# Patient Record
Sex: FEMALE | Race: BLACK OR AFRICAN AMERICAN | NOT HISPANIC OR LATINO | Employment: UNEMPLOYED | URBAN - METROPOLITAN AREA
[De-identification: names, ages, dates, MRNs, and addresses within clinical notes are randomized per-mention and may not be internally consistent; named-entity substitution may affect disease eponyms.]

---

## 2017-01-17 ENCOUNTER — OFFICE VISIT (OUTPATIENT)
Dept: URGENT CARE | Facility: URGENT CARE | Age: 27
End: 2017-01-17
Payer: MEDICARE

## 2017-01-17 VITALS
BODY MASS INDEX: 30.31 KG/M2 | SYSTOLIC BLOOD PRESSURE: 114 MMHG | HEART RATE: 74 BPM | OXYGEN SATURATION: 98 % | WEIGHT: 145 LBS | DIASTOLIC BLOOD PRESSURE: 76 MMHG | TEMPERATURE: 98.8 F

## 2017-01-17 DIAGNOSIS — L01.00 IMPETIGO: Primary | ICD-10-CM

## 2017-01-17 PROCEDURE — 87491 CHLMYD TRACH DNA AMP PROBE: CPT | Performed by: FAMILY MEDICINE

## 2017-01-17 PROCEDURE — 99213 OFFICE O/P EST LOW 20 MIN: CPT | Performed by: FAMILY MEDICINE

## 2017-01-17 PROCEDURE — 87591 N.GONORRHOEAE DNA AMP PROB: CPT | Performed by: FAMILY MEDICINE

## 2017-01-17 RX ORDER — MUPIROCIN 20 MG/G
OINTMENT TOPICAL 3 TIMES DAILY
Qty: 1 G | Refills: 1 | Status: SHIPPED | OUTPATIENT
Start: 2017-01-17 | End: 2017-01-22

## 2017-01-17 RX ORDER — LORAZEPAM 1 MG/1
1 TABLET ORAL EVERY 8 HOURS PRN
Qty: 15 TABLET | Refills: 0 | Status: SHIPPED | OUTPATIENT
Start: 2017-01-17 | End: 2017-04-25

## 2017-01-17 NOTE — PROGRESS NOTES
"Some of this note was populated by a medical assistant.    SUBJECTIVE:                                                    Ayesha Casas is a 26 year old female who presents to clinic today for the following health issues:    Rash      Duration: last week.     Description  Location: right side of face.   Itching: no    Intensity:  mild    Accompanying signs and symptoms: Pt states that she popped it 4 days ago and that she put some witch hazel, tea tree oil, noxzema- no relief.     History (similar episodes/previous evaluation): Pt c/o waking up in the middle of the night with gasping for air. Anxious that this may be \"herpes\"    Precipitating or alleviating factors:  New exposures:  Essential oils  Recent travel: YES- [place] four days ago. Pt states that she had the rash prior to going out to [place].      Therapies tried and outcome: none         Problem list and histories reviewed & adjusted, as indicated.  Additional history: as documented    Patient Active Problem List   Diagnosis     Severe persistent asthma     CARDIOVASCULAR SCREENING; LDL GOAL LESS THAN 160     Anxiety     Tobacco use disorder     Past Surgical History   Procedure Laterality Date     No history of surgery         Social History   Substance Use Topics     Smoking status: Current Every Day Smoker -- 0.20 packs/day     Types: Cigarettes     Smokeless tobacco: Never Used     Alcohol Use: Yes      Comment: beer 2/d     Family History   Problem Relation Age of Onset     Hypertension Maternal Grandmother      DIABETES Maternal Grandfather      Cancer - colorectal Maternal Grandfather      Breast Cancer Maternal Aunt          Current Outpatient Prescriptions   Medication Sig Dispense Refill     ibuprofen 200 MG capsule Take 200 mg by mouth every 4 hours as needed       cetirizine (ZYRTEC) 10 MG tablet Take 1 tablet (10 mg) by mouth every evening 60 tablet 5     hydrocortisone (ANUSOL-HC) 2.5 % rectal cream Place rectally 2 times daily 30 g 1 "     albuterol (PROVENTIL HFA: VENTOLIN HFA) 108 (90 BASE) MCG/ACT inhaler Inhale 2 puffs into the lungs every 6 hours. 2 Inhaler 5     Allergies   Allergen Reactions     Penicillins      ROS:  Constitutional, HEENT, cardiovascular, pulmonary, gi and gu systems are negative, except as otherwise noted.    OBJECTIVE:                                                    /76 mmHg  Pulse 74  Temp(Src) 98.8  F (37.1  C) (Oral)  Wt 145 lb (65.772 kg)  SpO2 98%  Breastfeeding? No  Body mass index is 30.31 kg/(m^2).  GENERAL: healthy, alert and no distress  NECK: no adenopathy, no asymmetry, masses, or scars and thyroid normal to palpation  RESP: lungs clear to auscultation - no rales, rhonchi or wheezes  CV: regular rate and rhythm, normal S1 S2, no S3 or S4, no murmur, click or rub, no peripheral edema and peripheral pulses strong  ABDOMEN: soft, nontender, no hepatosplenomegaly, no masses and bowel sounds normal  MS: no gross musculoskeletal defects noted, no edema  SKIN: right lower chin area focal honey crusted lesion that is consistent with impetigo   Verm[place] boarder and oral mucosa without lesions or rash.     Diagnostic Test Results:  none      ASSESSMENT/PLAN:                                                        ICD-10-CM    1. Impetigo L01.00 Neisseria gonorrhoeae PCR     Chlamydia trachomatis PCR     mupirocin (BACTROBAN) 2 % ointment     LORazepam (ATIVAN) 1 MG tablet        PLAN  Reassurance provided. STD screen as above per patient request.   Patient educational/instructional material provided including reasons for follow-up   The patient indicates understanding of these issues and agrees with the plan.  Og Jaquez MD      [facility]

## 2017-01-17 NOTE — MR AVS SNAPSHOT
After Visit Summary   1/17/2017    Ayesha Casas    MRN: [id number]           Patient Information     Date Of Birth          1990        Visit Information        Provider Department      1/17/2017 12:30 PM Og Jaquez MD [facility]        Today's Diagnoses     Impetigo    -  1       Care Instructions      Understanding Impetigo (Adult)  Impetigo is a skin infection that causes red, crusty sores. It s more common in young children, but adults can also get it. It can be spread easily from person to person.  What causes impetigo?  Impetigo is caused by bacteria. It can be caused by group A streptococci (strep). Or it can be caused by Staphylococcus aureus (staph). The bacteria can cause the infection after an insect bite, cut, or other skin problem causes a break in your skin.  Symptoms of impetigo  Impetigo causes itchy red sores that ooze fluid (pus) and form honey-colored crusts. They are most common on the face, arms, and legs. The sores can get bigger and spread to other parts of your body.  Diagnosing and treating impetigo  Your health care provider will give you a physical exam. He or she will be able to diagnose impetigo by looking at your sores. A bacterial culture may be performed. Impetigo can be treated with antibiotics. You may be given oral medication. Use the antibiotic medication exactly as directed. Do not stop using it if your symptoms get better. Use all of the medication until it is gone.  Your symptoms will likely get better within 3 days. The sores will take several weeks to heal fully.  If you have impetigo  Impetigo is easily spread from person to person. A person can get sores 1 to 3 days after being exposed to another person s sores. Make sure to protect those around you. To prevent the bacteria from spreading to others:    Cover your sores with a bandage. Wash your hands often. This will also help you avoid spreading the infection to  other parts of your body.    Don t share washcloths, towels, pillows, sheets, or clothes with others. Wash these items in hot water before using them again.    Don t scratch or pick at your sores.    Wash your sores with soap and water. Apply an antibacterial cream as advised.     When to call the health care provider  Make sure to contact your health care provider if you have:    Sores that spread or have more pus after 3 days of treatment    Pain or swelling that s new or worse    Fever of 100.4 F (38 C) or higher    Loss of appetite or vomiting     4071-8774 [employer]. [address] [place] [address]. All rights reserved. This information is not intended as a substitute for professional medical care. Always follow your healthcare professional's instructions.        Anxiety Reaction  Anxiety is the feeling we all get when we think something bad might happen. It is a normal response to stress and usually causes only a mild reaction. When anxiety becomes more severe, it can interfere with daily life. In some cases, you may not even be aware of what it is you re anxious about. There may also be a genetic link or it may be a learned behavior in the home.  Both psychological and physical triggers cause stress reaction. It's often a response to fear or emotional stress, real or imagined. This stress may come from home, family, work, or social relationships.  During an anxiety reaction, you may feel:    Helpless    Nervous    Depressed    Irritable  Your body may show signs of anxiety in many ways. You may experience:    Dry mouth    Shakiness    Dizziness    Weakness    Trouble breathing    Breathing fast (hyperventilating)    Chest pressure    Sweating    Headache    Nausea    Diarrhea    Tiredness    Inability to sleep    Sexual problems  Home care    Try to locate the sources of stress in your life. They may not be obvious. These may include:    Daily hassles of life (traffic jams, missed  appointments, car troubles, etc.)    Major life changes, both good (new baby, job promotion) and bad (loss of job, loss of loved one)    Overload: feeling that you have too many responsibilities and can't take care of all of them at once    Feeling helpless, feeling that your problems are beyond what you re able to solve    Notice how your body reacts to stress. Learn to listen to your body signals. This will help you take action before the stress becomes severe.    When you can, do something about the source of your stress. (Avoid hassles, limit the amount of change that happens in your life at one time and take a break when you feel overloaded).    Unfortunately, many stressful situations can't be avoided. It is necessary to learn how to better manage stress. There are many proven methods that will reduce your anxiety. These include simple things like exercise, good nutrition and adequate rest. Also, there are certain techniques that are helpful:    Relaxation    Breathing exercises    Visualization    Biofeedback    Meditation  For more information about this, consult your doctor or go to a local bookstore and review the many books and tapes available on this subject.  Follow-up care  If you feel that your anxiety is not responding to self-help measures, contact your doctor or make an appointment with a counselor. You may need short-term psychological counseling and temporary medicine to help you manage stress.  Call 911  Call your healthcare provider right away if any of these occur:    Trouble breathing    Confusion    Drowsiness or trouble wakening    Fainting or loss of consciousness    Rapid heart rate    Seizure    New chest pain that becomes more severe, lasts longer, or spreads into your shoulder, arm, neck, jaw, or back  When to seek medical advice  Call your healthcare provider right away if any of these occur:    Your symptoms get worse    Severe headache not relieved by rest and mild pain reliever     "[id number] [employer]. [address] [place] [address], [place] [address]. All rights reserved. This information is not intended as a substitute for professional medical care. Always follow your healthcare professional's instructions.              Follow-ups after your visit        Who to contact     If you have questions or need follow up information about today's clinic visit or your schedule please contact [facility] directly at [phone].  Normal or non-critical lab and imaging results will be communicated to you by [employer], letter or phone within 4 business days after the clinic has received the results. If you do not hear from us within 7 days, please contact the clinic through [employer] or phone. If you have a critical or abnormal lab result, we will notify you by phone as soon as possible.  Submit refill requests through [employer] or call your pharmacy and they will forward the refill request to us. Please allow 3 business days for your refill to be completed.          Additional Information About Your Visit        [employer] Information     [employer]ender lets you send messages to your doctor, view your test results, renew your prescriptions, schedule appointments and more. To sign up, go to [email or web address] . Click on \"Log in\" on the left side of the screen, which will take you to the Welcome page. Then click on \"Sign up Now\" on the right side of the page.     You will be asked to enter the access code listed below, as well as some personal information. Please follow the directions to create your username and password.     Your access code is: [email or web address]  Expires: 2017  2:27 PM     Your access code will  in 90 days. If you need help or a new code, please call your [facility] clinic or [phone].        Care EveryWhere ID     This is your Care EveryWhere ID. This could be used by other organizations to access your [facility] medical records  [id number]        Your Vitals " Were     Pulse Temperature Pulse Oximetry Breastfeeding?          74 98.8  F (37.1  C) (Oral) 98% No         Blood Pressure from Last 3 Encounters:   01/17/17 114/76   11/19/14 128/78   06/03/14 111/76    Weight from Last 3 Encounters:   01/17/17 145 lb (65.772 kg)   11/19/14 123 lb (55.792 kg)   06/03/14 121 lb (54.885 kg)              We Performed the Following     Chlamydia trachomatis PCR     Neisseria gonorrhoeae PCR          Today's Medication Changes          These changes are accurate as of: 1/17/17  2:27 PM.  If you have any questions, ask your nurse or doctor.               Start taking these medicines.        Dose/Directions    LORazepam 1 MG tablet   Commonly known as:  ATIVAN   Used for:  Impetigo   Started by:  Og Jaquez MD        Dose:  1 mg   Take 1 tablet (1 mg) by mouth every 8 hours as needed for anxiety   Quantity:  15 tablet   Refills:  0       mupirocin 2 % ointment   Commonly known as:  BACTROBAN   Used for:  Impetigo   Started by:  Og Jaquez MD        Apply topically 3 times daily for 5 days   Quantity:  1 g   Refills:  1            Where to get your medicines      These medications were sent to [facility] - [address] AT [facility]  [address] [facility] [address]    Hours:  24-hours Phone:  [phone]    - mupirocin 2 % ointment      Some of these will need a paper prescription and others can be bought over the counter.  Ask your nurse if you have questions.     Bring a paper prescription for each of these medications    - LORazepam 1 MG tablet             Primary Care Provider Office Phone # Fax #    Mulu Mcmillan PA-C [phone]       [facility] [address] [facility] [address]        Thank you!     Thank you for choosing [facility]  for your care. Our goal is always to provide you with excellent care. Hearing back from our  patients is one way we can continue to improve our services. Please take a few minutes to complete the written survey that you may receive in the mail after your visit with us. Thank you!             Your Updated Medication List - Protect others around you: Learn how to safely use, store and throw away your medicines at www.disposemymeds.org.          This list is accurate as of: 1/17/17  2:27 PM.  Always use your most recent med list.                   Brand Name Dispense Instructions for use    albuterol 108 (90 BASE) MCG/ACT Inhaler    PROAIR HFA/PROVENTIL HFA/VENTOLIN HFA    2 Inhaler    Inhale 2 puffs into the lungs every 6 hours.       cetirizine 10 MG tablet    zyrTEC    60 tablet    Take 1 tablet (10 mg) by mouth every evening       hydrocortisone 2.5 % cream    ANUSOL-HC    30 g    Place rectally 2 times daily       ibuprofen 200 MG capsule      Take 200 mg by mouth every 4 hours as needed       LORazepam 1 MG tablet    ATIVAN    15 tablet    Take 1 tablet (1 mg) by mouth every 8 hours as needed for anxiety       mupirocin 2 % ointment    BACTROBAN    1 g    Apply topically 3 times daily for 5 days

## 2017-01-17 NOTE — Clinical Note
[facility]  [address]  [facility] [address]               January 18, 2017    Ayesha Casas  [address] [facility] [address]    Results for orders placed or performed in visit on 01/17/17   Neisseria gonorrhoeae PCR   Result Value Ref Range    Specimen Descrip Urine     N Gonorrhea PCR  NEG     Negative   Negative for N. gonorrhoeae rRNA by transcription mediated amplification.   A negative result by transcription mediated amplification does not preclude the   presence of N. gonorrhoeae infection because results are dependent on proper   and adequate collection, absence of inhibitors, and sufficient rRNA to be   detected.     Chlamydia trachomatis PCR   Result Value Ref Range    Specimen Description Urine     Chlamydia Trachomatis PCR  NEG     Negative   Negative for C. trachomatis rRNA by transcription mediated amplification.   A negative result by transcription mediated amplification does not preclude the   presence of C. trachomatis infection because results are dependent on proper   and adequate collection, absence of inhibitors, and sufficient rRNA to be   detected.         Dear Ayesha,    Your recent test results came back normal. If symptoms continue or worsen please come back to the clinic.    If you have any questions or concerns, please call myself or my nurse at [phone].      Sincerely,    Og Jaquez MD/AK

## 2017-01-17 NOTE — NURSING NOTE
"Chief Complaint   Patient presents with     Derm Problem     Pt c/o rash on face, breathing concern, and need for STD screening.        Initial /76 mmHg  Pulse 74  Temp(Src) 98.8  F (37.1  C) (Oral)  Wt 145 lb (65.772 kg)  SpO2 98%  Breastfeeding? No Estimated body mass index is 30.31 kg/(m^2) as calculated from the following:    Height as of 6/3/14: 4' 10\" (1.473 m).    Weight as of this encounter: 145 lb (65.772 kg).  BP completed using cuff size: regular    Judith Casas CMA (AAMA)      "

## 2017-01-17 NOTE — PATIENT INSTRUCTIONS
Understanding Impetigo (Adult)  Impetigo is a skin infection that causes red, crusty sores. It s more common in young children, but adults can also get it. It can be spread easily from person to person.  What causes impetigo?  Impetigo is caused by bacteria. It can be caused by group A streptococci (strep). Or it can be caused by Staphylococcus aureus (staph). The bacteria can cause the infection after an insect bite, cut, or other skin problem causes a break in your skin.  Symptoms of impetigo  Impetigo causes itchy red sores that ooze fluid (pus) and form honey-colored crusts. They are most common on the face, arms, and legs. The sores can get bigger and spread to other parts of your body.  Diagnosing and treating impetigo  Your health care provider will give you a physical exam. He or she will be able to diagnose impetigo by looking at your sores. A bacterial culture may be performed. Impetigo can be treated with antibiotics. You may be given oral medication. Use the antibiotic medication exactly as directed. Do not stop using it if your symptoms get better. Use all of the medication until it is gone.  Your symptoms will likely get better within 3 days. The sores will take several weeks to heal fully.  If you have impetigo  Impetigo is easily spread from person to person. A person can get sores 1 to 3 days after being exposed to another person s sores. Make sure to protect those around you. To prevent the bacteria from spreading to others:    Cover your sores with a bandage. Wash your hands often. This will also help you avoid spreading the infection to other parts of your body.    Don t share washcloths, towels, pillows, sheets, or clothes with others. Wash these items in hot water before using them again.    Don t scratch or pick at your sores.    Wash your sores with soap and water. Apply an antibacterial cream as advised.     When to call the health care provider  Make sure to contact your health care  provider if you have:    Sores that spread or have more pus after 3 days of treatment    Pain or swelling that s new or worse    Fever of 100.4 F (38 C) or higher    Loss of appetite or vomiting     6361-7339 [employer]. [address] [place] [address], [place] [address]. All rights reserved. This information is not intended as a substitute for professional medical care. Always follow your healthcare professional's instructions.        Anxiety Reaction  Anxiety is the feeling we all get when we think something bad might happen. It is a normal response to stress and usually causes only a mild reaction. When anxiety becomes more severe, it can interfere with daily life. In some cases, you may not even be aware of what it is you re anxious about. There may also be a genetic link or it may be a learned behavior in the home.  Both psychological and physical triggers cause stress reaction. It's often a response to fear or emotional stress, real or imagined. This stress may come from home, family, work, or social relationships.  During an anxiety reaction, you may feel:    Helpless    Nervous    Depressed    Irritable  Your body may show signs of anxiety in many ways. You may experience:    Dry mouth    Shakiness    Dizziness    Weakness    Trouble breathing    Breathing fast (hyperventilating)    Chest pressure    Sweating    Headache    Nausea    Diarrhea    Tiredness    Inability to sleep    Sexual problems  Home care    Try to locate the sources of stress in your life. They may not be obvious. These may include:    Daily hassles of life (traffic jams, missed appointments, car troubles, etc.)    Major life changes, both good (new baby, job promotion) and bad (loss of job, loss of loved one)    Overload: feeling that you have too many responsibilities and can't take care of all of them at once    Feeling helpless, feeling that your problems are beyond what you re able to solve    Notice how your body reacts to  stress. Learn to listen to your body signals. This will help you take action before the stress becomes severe.    When you can, do something about the source of your stress. (Avoid hassles, limit the amount of change that happens in your life at one time and take a break when you feel overloaded).    Unfortunately, many stressful situations can't be avoided. It is necessary to learn how to better manage stress. There are many proven methods that will reduce your anxiety. These include simple things like exercise, good nutrition and adequate rest. Also, there are certain techniques that are helpful:    Relaxation    Breathing exercises    Visualization    Biofeedback    Meditation  For more information about this, consult your doctor or go to a local bookstore and review the many books and tapes available on this subject.  Follow-up care  If you feel that your anxiety is not responding to self-help measures, contact your doctor or make an appointment with a counselor. You may need short-term psychological counseling and temporary medicine to help you manage stress.  Call 911  Call your healthcare provider right away if any of these occur:    Trouble breathing    Confusion    Drowsiness or trouble wakening    Fainting or loss of consciousness    Rapid heart rate    Seizure    New chest pain that becomes more severe, lasts longer, or spreads into your shoulder, arm, neck, jaw, or back  When to seek medical advice  Call your healthcare provider right away if any of these occur:    Your symptoms get worse    Severe headache not relieved by rest and mild pain reliever    3839-3461 [employer]. [address] [place] [address]. All rights reserved. This information is not intended as a substitute for professional medical care. Always follow your healthcare professional's instructions.

## 2017-01-18 LAB
C TRACH DNA SPEC QL NAA+PROBE: NORMAL
N GONORRHOEA DNA SPEC QL NAA+PROBE: NORMAL
SPECIMEN SOURCE: NORMAL
SPECIMEN SOURCE: NORMAL

## 2017-04-25 ENCOUNTER — OFFICE VISIT (OUTPATIENT)
Dept: FAMILY MEDICINE | Facility: CLINIC | Age: 27
End: 2017-04-25
Payer: MEDICARE

## 2017-04-25 VITALS
BODY MASS INDEX: 30.28 KG/M2 | RESPIRATION RATE: 22 BRPM | HEART RATE: 88 BPM | WEIGHT: 150.2 LBS | TEMPERATURE: 97.3 F | HEIGHT: 59 IN | OXYGEN SATURATION: 96 % | SYSTOLIC BLOOD PRESSURE: 116 MMHG | DIASTOLIC BLOOD PRESSURE: 85 MMHG

## 2017-04-25 DIAGNOSIS — J45.21 INTERMITTENT ASTHMA, WITH ACUTE EXACERBATION: Primary | ICD-10-CM

## 2017-04-25 DIAGNOSIS — F60.3 BORDERLINE PERSONALITY DISORDER (H): ICD-10-CM

## 2017-04-25 DIAGNOSIS — F43.22 ADJUSTMENT DISORDER WITH ANXIOUS MOOD: ICD-10-CM

## 2017-04-25 DIAGNOSIS — K12.0 CANKER SORE: ICD-10-CM

## 2017-04-25 PROCEDURE — 99214 OFFICE O/P EST MOD 30 MIN: CPT | Performed by: PHYSICIAN ASSISTANT

## 2017-04-25 RX ORDER — LORAZEPAM 1 MG/1
1 TABLET ORAL DAILY PRN
Qty: 30 TABLET | Refills: 0 | Status: SHIPPED | OUTPATIENT
Start: 2017-04-25 | End: 2023-02-13

## 2017-04-25 RX ORDER — ALBUTEROL SULFATE 90 UG/1
2 AEROSOL, METERED RESPIRATORY (INHALATION) EVERY 6 HOURS
Qty: 1 INHALER | Refills: 5 | Status: SHIPPED | OUTPATIENT
Start: 2017-04-25 | End: 2017-12-15

## 2017-04-25 RX ORDER — LORAZEPAM 1 MG/1
1 TABLET ORAL EVERY 8 HOURS PRN
Qty: 15 TABLET | Refills: 0 | Status: CANCELLED | OUTPATIENT
Start: 2017-04-25

## 2017-04-25 RX ORDER — AZITHROMYCIN 250 MG/1
TABLET, FILM COATED ORAL
Qty: 6 TABLET | Refills: 0 | Status: SHIPPED | OUTPATIENT
Start: 2017-04-25 | End: 2017-08-16

## 2017-04-25 RX ORDER — PREDNISONE 20 MG/1
40 TABLET ORAL DAILY
Qty: 10 TABLET | Refills: 0 | Status: SHIPPED | OUTPATIENT
Start: 2017-04-25 | End: 2017-04-30

## 2017-04-25 RX ORDER — CODEINE PHOSPHATE AND GUAIFENESIN 10; 100 MG/5ML; MG/5ML
2 SOLUTION ORAL EVERY 6 HOURS PRN
Qty: 180 ML | Refills: 0 | Status: SHIPPED | OUTPATIENT
Start: 2017-04-25 | End: 2017-08-16

## 2017-04-25 ASSESSMENT — ANXIETY QUESTIONNAIRES
GAD7 TOTAL SCORE: 18
1. FEELING NERVOUS, ANXIOUS, OR ON EDGE: NEARLY EVERY DAY
6. BECOMING EASILY ANNOYED OR IRRITABLE: MORE THAN HALF THE DAYS
2. NOT BEING ABLE TO STOP OR CONTROL WORRYING: NEARLY EVERY DAY
3. WORRYING TOO MUCH ABOUT DIFFERENT THINGS: NEARLY EVERY DAY
5. BEING SO RESTLESS THAT IT IS HARD TO SIT STILL: MORE THAN HALF THE DAYS
7. FEELING AFRAID AS IF SOMETHING AWFUL MIGHT HAPPEN: NEARLY EVERY DAY
IF YOU CHECKED OFF ANY PROBLEMS ON THIS QUESTIONNAIRE, HOW DIFFICULT HAVE THESE PROBLEMS MADE IT FOR YOU TO DO YOUR WORK, TAKE CARE OF THINGS AT HOME, OR GET ALONG WITH OTHER PEOPLE: NOT DIFFICULT AT ALL

## 2017-04-25 ASSESSMENT — PATIENT HEALTH QUESTIONNAIRE - PHQ9: 5. POOR APPETITE OR OVEREATING: MORE THAN HALF THE DAYS

## 2017-04-25 ASSESSMENT — PAIN SCALES - GENERAL: PAINLEVEL: EXTREME PAIN (8)

## 2017-04-25 NOTE — LETTER
[address] [address]  Phone: [phone]    April 25, 2017        Ayesha Casas  [address] [facility] [address]          To whom it may concern:    RE: Ayesha Casas    Patient was seen and treated today at our clinic and missed work 4/24/17-4/27/17    Please contact me for questions or concerns.      Sincerely,        Danika Walter PAC

## 2017-04-25 NOTE — PATIENT INSTRUCTIONS
Azithromycin 2 tablet today then 1 tablet daily for 4 more days  Prednisone 40 mg once daily (2 tabs) for 5 days    Cough syrup every 6 hours as needed      Sertraline start 25 mg daily for 1 week, then increase to 50 mg daily   Follow up in 1 month

## 2017-04-25 NOTE — MR AVS SNAPSHOT
"              After Visit Summary   4/25/2017    Ayesha Casas    MRN: [id number]           Patient Information     Date Of Birth          1990        Visit Information        Provider Department      4/25/2017 8:40 AM Fatou Walter PA-C [facility]        Today's Diagnoses     Intermittent asthma, with acute exacerbation    -  1    Canker sore        Adjustment disorder with anxious mood        Borderline personality disorder          Care Instructions    Azithromycin 2 tablet today then 1 tablet daily for 4 more days  Prednisone 40 mg once daily (2 tabs) for 5 days    Cough syrup every 6 hours as needed      Sertraline start 25 mg daily for 1 week, then increase to 50 mg daily   Follow up in 1 month         Follow-ups after your visit        Who to contact     If you have questions or need follow up information about today's clinic visit or your schedule please contact [facility] directly at [phone].  Normal or non-critical lab and imaging results will be communicated to you by MyChart, letter or phone within 4 business days after the clinic has received the results. If you do not hear from us within 7 days, please contact the clinic through [facility] or phone. If you have a critical or abnormal lab result, we will notify you by phone as soon as possible.  Submit refill requests through [employer] or call your pharmacy and they will forward the refill request to us. Please allow 3 business days for your refill to be completed.          Additional Information About Your Visit        MyChart Information     [employer] lets you send messages to your doctor, view your test results, renew your prescriptions, schedule appointments and more. To sign up, go to [email or web address] . Click on \"Log in\" on the left side of the screen, which will take you to the Welcome page. Then click on \"Sign up Now\" on the right side of the page.     You will be asked to " "enter the access code listed below, as well as some personal information. Please follow the directions to create your username and password.     Your access code is: [email or web address]  Expires: 2017  9:35 AM     Your access code will  in 90 days. If you need help or a new code, please call your [place] clinic or [phone].        Care EveryWhere ID     This is your Care EveryWhere ID. This could be used by other organizations to access your [place] medical records  [id number]        Your Vitals Were     Pulse Temperature Respirations Height Pulse Oximetry BMI (Body Mass Index)    88 97.3  F (36.3  C) (Tympanic) 22 4' 10.5\" (1.486 m) 96% 30.86 kg/m2       Blood Pressure from Last 3 Encounters:   17 116/85   17 114/76   14 128/78    Weight from Last 3 Encounters:   17 150 lb 3.2 oz (68.1 kg)   17 145 lb (65.8 kg)   14 123 lb (55.8 kg)              Today, you had the following     No orders found for display         Today's Medication Changes          These changes are accurate as of: 17  9:35 AM.  If you have any questions, ask your nurse or doctor.               Start taking these medicines.        Dose/Directions    azithromycin 250 MG tablet   Commonly known as:  ZITHROMAX   Used for:  Intermittent asthma, with acute exacerbation   Started by:  Fatou Walter PA-C        Two tablets first day, then one tablet daily for four days.   Quantity:  6 tablet   Refills:  0       benzocaine 10 % Gel   Commonly known as:  ORAJEL/ANBESOL   Used for:  Canker sore   Started by:  Fatou Walter PA-C        Take by mouth 4 times daily as needed for moderate pain   Quantity:  9 g   Refills:  0       guaiFENesin-codeine 100-10 MG/5ML Soln solution   Commonly known as:  ROBITUSSIN AC   Used for:  Intermittent asthma, with acute exacerbation   Started by:  Fatou Walter PA-C        Dose:  2 tsp.   Take 10 mLs by mouth every 6 hours " as needed   Quantity:  180 mL   Refills:  0       predniSONE 20 MG tablet   Commonly known as:  DELTASONE   Used for:  Intermittent asthma, with acute exacerbation   Started by:  Fatou Walter PA-C        Dose:  40 mg   Take 2 tablets (40 mg) by mouth daily for 5 days   Quantity:  10 tablet   Refills:  0       sertraline 50 MG tablet   Commonly known as:  ZOLOFT   Used for:  Adjustment disorder with anxious mood   Started by:  Fatou Walter PA-C        Dose:  25 mg   Take 0.5 tablets (25 mg) by mouth daily increase to 50 mg daily in 1 week   Quantity:  30 tablet   Refills:  3         These medicines have changed or have updated prescriptions.        Dose/Directions    LORazepam 1 MG tablet   Commonly known as:  ATIVAN   This may have changed:  when to take this   Used for:  Adjustment disorder with anxious mood   Changed by:  Fatou Walter PA-C        Dose:  1 mg   Take 1 tablet (1 mg) by mouth daily as needed for anxiety   Quantity:  30 tablet   Refills:  0            Where to get your medicines      These medications were sent to [id number] - [facility] [address] [facility]  [address] [address]    Hours:  24-hours Phone:  [phone]     azithromycin 250 MG tablet    benzocaine 10 % Gel    predniSONE 20 MG tablet    sertraline 50 MG tablet         Some of these will need a paper prescription and others can be bought over the counter.  Ask your nurse if you have questions.     Bring a paper prescription for each of these medications     guaiFENesin-codeine 100-10 MG/5ML Soln solution    LORazepam 1 MG tablet                Primary Care Provider Office Phone # Fax #    Mulu Mcmillan PA-C [phone]       [facility] [address] [facility] [address]        Thank you!     Thank you for choosing [facility]  for your care. Our  goal is always to provide you with excellent care. Hearing back from our patients is one way we can continue to improve our services. Please take a few minutes to complete the written survey that you may receive in the mail after your visit with us. Thank you!             Your Updated Medication List - Protect others around you: Learn how to safely use, store and throw away your medicines at www.disposemymeds.org.          This list is accurate as of: 4/25/17  9:35 AM.  Always use your most recent med list.                   Brand Name Dispense Instructions for use    albuterol 108 (90 BASE) MCG/ACT Inhaler    PROAIR HFA/PROVENTIL HFA/VENTOLIN HFA    2 Inhaler    Inhale 2 puffs into the lungs every 6 hours.       azithromycin 250 MG tablet    ZITHROMAX    6 tablet    Two tablets first day, then one tablet daily for four days.       benzocaine 10 % Gel    ORAJEL/ANBESOL    9 g    Take by mouth 4 times daily as needed for moderate pain       cetirizine 10 MG tablet    zyrTEC    60 tablet    Take 1 tablet (10 mg) by mouth every evening       guaiFENesin-codeine 100-10 MG/5ML Soln solution    ROBITUSSIN AC    180 mL    Take 10 mLs by mouth every 6 hours as needed       hydrocortisone 2.5 % cream    ANUSOL-HC    30 g    Place rectally 2 times daily       ibuprofen 200 MG capsule      Take 200 mg by mouth every 4 hours as needed       LORazepam 1 MG tablet    ATIVAN    30 tablet    Take 1 tablet (1 mg) by mouth daily as needed for anxiety       predniSONE 20 MG tablet    DELTASONE    10 tablet    Take 2 tablets (40 mg) by mouth daily for 5 days       sertraline 50 MG tablet    ZOLOFT    30 tablet    Take 0.5 tablets (25 mg) by mouth daily increase to 50 mg daily in 1 week

## 2017-04-25 NOTE — NURSING NOTE
"Chief Complaint   Patient presents with     Cough     3 days        Initial /85 (BP Location: Left arm, Patient Position: Chair, Cuff Size: Adult Regular)  Pulse 88  Temp 97.3  F (36.3  C) (Tympanic)  Resp 22  Ht 4' 10.5\" (1.486 m)  Wt 150 lb 3.2 oz (68.1 kg)  SpO2 96%  BMI 30.86 kg/m2 Estimated body mass index is 30.86 kg/(m^2) as calculated from the following:    Height as of this encounter: 4' 10.5\" (1.486 m).    Weight as of this encounter: 150 lb 3.2 oz (68.1 kg).  Medication Reconciliation: complete     Danyelle Clarke CMA      "

## 2017-04-25 NOTE — LETTER
My Asthma Action Plan  Name: Ayesha Casas   YOB: 1990  Date: 4/25/2017   My doctor: Fatou Walter PA-C   My clinic: [facility]        My Control Medicine: none  My Rescue Medicine: Albuterol (Proair/Ventolin/Proventil) inhaler 2 puffs q 4-6 hrs prn   My Oral Steroid Medicine: Prednisone 40 mg daily for 5 days My Asthma Severity: intermittent  Avoid your asthma triggers: upper respiratory infections and dust mites               GREEN ZONE     Good Control    I feel good    No cough or wheeze    Can work, sleep and play without asthma symptoms       Take your asthma control medicine every day.     1. If exercise triggers your asthma, take your rescue medication    15 minutes before exercise or sports, and    During exercise if you have asthma symptoms  2. Spacer to use with inhaler: If you have a spacer, make sure to use it with your inhaler             YELLOW ZONE     Getting Worse  I have ANY of these:    I do not feel good    Cough or wheeze    Chest feels tight    Wake up at night   1. Keep taking your Green Zone medications  2. Start taking your rescue medicine:    every 20 minutes for up to 1 hour. Then every 4 hours for 24-48 hours.  3. If you stay in the Yellow Zone for more than 12-24 hours, contact your doctor.  4. If you do not return to the Green Zone in 12-24 hours or you get worse, start taking your oral steroid medicine if prescribed by your provider.           RED ZONE     Medical Alert - Get Help  I have ANY of these:    I feel awful    Medicine is not helping    Breathing getting harder    Trouble walking or talking    Nose opens wide to breathe       1. Take your rescue medicine NOW  2. If your provider has prescribed an oral steroid medicine, start taking it NOW  3. Call your doctor NOW  4. If you are still in the Red Zone after 20 minutes and you have not reached your doctor:    Take your rescue medicine again and    Call 911 or go to the  emergency room right away    See your regular doctor within 2 weeks of an Emergency Room or Urgent Care visit for follow-up treatment.        Electronically signed by: Fatou Walter, April 25, 2017    Annual Reminders:  Meet with Asthma Educator,  Flu Shot in the Fall, consider Pneumonia Vaccination for patients with asthma (aged 19 and older).    Pharmacy:    [facility] [place], [place] - [address]  [facility] [address] [place], [place] - [address] AT [facility]                    Asthma Triggers  How To Control Things That Make Your Asthma Worse    Triggers are things that make your asthma worse.  Look at the list below to help you find your triggers and what you can do about them.  You can help prevent asthma flare-ups by staying away from your triggers.      Trigger                                                          What you can do   Cigarette Smoke  Tobacco smoke can make asthma worse. Do not allow smoking in your home, car or around you.  Be sure no one smokes at a child s day care or school.  If you smoke, ask your health care provider for ways to help you quit.  Ask family members to quit too.  Ask your health care provider for a referral to Quit Plan to help you quit smoking, or call 0-824-805-PLAN.     Colds, Flu, Bronchitis  These are common triggers of asthma. Wash your hands often.  Don t touch your eyes, nose or mouth.  Get a flu shot every year.     Dust Mites  These are tiny bugs that live in cloth or carpet. They are too small to see. Wash sheets and blankets in hot water every week.   Encase pillows and mattress in dust mite proof covers.  Avoid having carpet if you can. If you have carpet, vacuum weekly.   Use a dust mask and HEPA vacuum.   Pollen and Outdoor Mold  Some people are allergic to trees, grass, or weed pollen, or molds. Try to keep your windows closed.  Limit time out doors when pollen count is high.    Ask you health care provider about taking medicine during allergy season.     Animal Dander  Some people are allergic to skin flakes, urine or saliva from pets with fur or feathers. Keep pets with fur or feathers out of your home.    If you can t keep the pet outdoors, then keep the pet out of your bedroom.  Keep the bedroom door closed.  Keep pets off cloth furniture and away from stuffed toys.     Mice, Rats, and Cockroaches  Some people are allergic to the waste from these pests.   Cover food and garbage.  Clean up spills and food crumbs.  Store grease in the refrigerator.   Keep food out of the bedroom.   Indoor Mold  This can be a trigger if your home has high moisture. Fix leaking faucets, pipes, or other sources of water.   Clean moldy surfaces.  Dehumidify basement if it is damp and smelly.   Smoke, Strong Odors, and Sprays  These can reduce air quality. Stay away from strong odors and sprays, such as perfume, powder, hair spray, paints, smoke incense, paint, cleaning products, candles and new carpet.   Exercise or Sports  Some people with asthma have this trigger. Be active!  Ask your doctor about taking medicine before sports or exercise to prevent symptoms.    Warm up for 5-10 minutes before and after sports or exercise.     Other Triggers of Asthma  Cold air:  Cover your nose and mouth with a scarf.  Sometimes laughing or crying can be a trigger.  Some medicines and food can trigger asthma.

## 2017-04-25 NOTE — PROGRESS NOTES
SUBJECTIVE:  Ayesha Casas is a 26 year old female who presents with the following concerns;              Symptoms: cc Present Absent Comment   Fever/Chills  x     Fatigue  x     Muscle Aches  x     Eye Irritation   x twitiching    Sneezing  x  Alg. Also    Nasal Daljit/Drg  x     Sinus Pressure/Pain  x     Loss of smell  x     Dental pain   x    Sore Throat   x Just dry    Swollen Glands   x    Ear Pain/Fullness  x  Left    Cough  x     Wheeze  x     Chest Pain  x  With cough    Shortness of breath   x    Rash  x  Poss. Staph infection.    Other         Symptom duration:  3day    Sympom severity:  mod-sever   Treatments tried:  tylenol, nyquil & dayquil    Contacts:  cousin is ill     Patient also needs refill of her anxiety medications. She has stopped going to [facility] due to did not get along with their providers. Mother reports that patient was diagnosed with BPD.     Medications updated and reviewed.  Past, family and surgical history is updated and reviewed in the record.  ROS:  Other than noted above, general, HEENT, respiratory, cardiac and gastrointestinal systems are negative.  OBJECTIVE:  GENERAL:  Alert, no acute distress  EYES:  PERRL, EOM normal, conjunctiva and lids normal  HEENT:  Ears and TMs normal, oral mucosa-single small ulceration in the inside of the lower lip and posterior oropharynx normal  RESP:  Lungs clear to auscultation.  CV: normal rate, regular rhythm, no murmur or gallop.  PSYCH: affect and mood normal, anxious, insight and judgement slightly impaired due to anxious personality  Assessment/Plan:     ICD-10-CM    1. Intermittent asthma, with acute exacerbation J45.21 predniSONE (DELTASONE) 20 MG tablet     azithromycin (ZITHROMAX) 250 MG tablet     guaiFENesin-codeine (ROBITUSSIN AC) 100-10 MG/5ML SOLN solution     albuterol (PROAIR HFA/PROVENTIL HFA/VENTOLIN HFA) 108 (90 BASE) MCG/ACT Inhaler   2. Canker sore K12.0 benzocaine (ORAJEL/ANBESOL) 10 % GEL   3. Adjustment disorder  with anxious mood F43.22 LORazepam (ATIVAN) 1 MG tablet     sertraline (ZOLOFT) 50 MG tablet   4. Borderline personality disorder F60.3       1Azithromycin 2 tablet today then 1 tablet daily for 4 more days  Prednisone 40 mg once daily (2 tabs) for 5 days  Cough syrup every 6 hours as needed    Follow up in 5 days  2. Use orajel  3. Sertraline start 25 mg daily for 1 week, then increase to 50 mg daily  SE were discussed. No SI per patient.    Discussed that due to Lorazepam is an addictive medication, its not a good idea to use it as a main medication for controlling anxiety. Patient was not aware of the nature of Lorazepam and has agreed to try daily SSRI to help manage anxiety.   Follow up in 1 month

## 2017-04-26 ASSESSMENT — ASTHMA QUESTIONNAIRES: ACT_TOTALSCORE: 16

## 2017-04-26 ASSESSMENT — ANXIETY QUESTIONNAIRES: GAD7 TOTAL SCORE: 18

## 2017-05-17 ENCOUNTER — TELEPHONE (OUTPATIENT)
Dept: FAMILY MEDICINE | Facility: CLINIC | Age: 27
End: 2017-05-17

## 2017-05-17 NOTE — LETTER
May 17, 2017      Ayesha Casas  [address] [address]        Dear Ayesha Casas,      At [facility] we care about your health and are committed to providing quality patient care. It has come to our attention that you are due for an Asthma Control Test.     This screening tool helps us to assess how well your asthma is controlled. Good asthma control leads to less asthma symptoms and greater health. If your asthma is not in good control (score less than 20) it is recommended you be seen by your provider for medication and lifestyle adjustments    We have enclosed an  Asthma Control Test. Please complete the enclosed asthma control test even if you are feeling well. You can mail it back to our clinic or drop if off at our .     Thank you for your time and we hope this letter finds you well!      Sincerely,    Your Team at [facility]

## 2017-05-30 NOTE — TELEPHONE ENCOUNTER
Spoke with the pt today she score 14, did not wanted to schedule follow up at this time, will call back later today after reviewing her schedule.  SHABBIR Kan ([facility])

## 2017-05-31 ASSESSMENT — ASTHMA QUESTIONNAIRES: ACT_TOTALSCORE: 14

## 2017-07-14 ENCOUNTER — TELEPHONE (OUTPATIENT)
Dept: FAMILY MEDICINE | Facility: CLINIC | Age: 27
End: 2017-07-14

## 2017-07-14 NOTE — TELEPHONE ENCOUNTER
Panel Management Review      BP Readings from Last 1 Encounters:   04/25/17 116/85        Fail List measure: pap      Patient is due/failing the following:   PAP    Action needed:   Patient needs office visit for physical.    Type of outreach:    Phone, left message for patient to call back.     Questions for provider review:    None                                                                                                                                    Annmarie Gar MA

## 2017-07-20 ENCOUNTER — TELEPHONE (OUTPATIENT)
Dept: FAMILY MEDICINE | Facility: CLINIC | Age: 27
End: 2017-07-20

## 2017-07-20 DIAGNOSIS — J45.20 INTERMITTENT ASTHMA, UNCOMPLICATED: Primary | ICD-10-CM

## 2017-07-20 PROBLEM — J45.21 INTERMITTENT ASTHMA, WITH ACUTE EXACERBATION: Status: ACTIVE | Noted: 2017-07-20

## 2017-07-21 NOTE — TELEPHONE ENCOUNTER
This writer attempted to contact Ayesha on 07/21/17      Reason for call ACT & Schedule Physical  and left message to return call.      When patient calls back, please schedule Physical with a PAP appointment within 1 week with Nuvia Walter, document the schedule appointment and contact 2nd floor [place] Care Team (MA/TC). If no one available, document that pt called and route to care team. route - high priority .          Ale Kitchen, CMA

## 2017-07-26 NOTE — TELEPHONE ENCOUNTER
Spoke with the pt and she has schedule physical with pap for next week, staff will postponed this msg so pt can have a chance to do the ACT in clinic.  SHABBIR Kan ([facility])

## 2017-08-03 NOTE — TELEPHONE ENCOUNTER
This writer attempted to contact Ayesha on 08/03/17      Reason for call ACT and left message to return call.      When patient calls back, please contact 2nd floor [place] Care Team (MA/TC). If no one available, document that pt called and route to care team. route - high priority .          Ale Kitchen CMA

## 2017-08-07 NOTE — TELEPHONE ENCOUNTER
Spoke again with the pt over the phone, she apologize for no show last week.   Pt reschedule physical for next week with Dr. Cummings.  Staff will recheck this msg a day after the physical to review ACT.  SHABBIR Kan ([facility])

## 2017-08-16 ENCOUNTER — OFFICE VISIT (OUTPATIENT)
Dept: FAMILY MEDICINE | Facility: CLINIC | Age: 27
End: 2017-08-16
Payer: MEDICARE

## 2017-08-16 VITALS
BODY MASS INDEX: 31.78 KG/M2 | SYSTOLIC BLOOD PRESSURE: 107 MMHG | TEMPERATURE: 98.8 F | HEIGHT: 58 IN | DIASTOLIC BLOOD PRESSURE: 73 MMHG | OXYGEN SATURATION: 98 % | HEART RATE: 79 BPM | WEIGHT: 151.4 LBS

## 2017-08-16 DIAGNOSIS — F17.200 TOBACCO USE DISORDER: ICD-10-CM

## 2017-08-16 DIAGNOSIS — Z00.00 ROUTINE GENERAL MEDICAL EXAMINATION AT A HEALTH CARE FACILITY: Primary | ICD-10-CM

## 2017-08-16 DIAGNOSIS — Z12.4 SCREENING FOR MALIGNANT NEOPLASM OF CERVIX: ICD-10-CM

## 2017-08-16 DIAGNOSIS — Z11.3 SCREEN FOR STD (SEXUALLY TRANSMITTED DISEASE): ICD-10-CM

## 2017-08-16 DIAGNOSIS — J45.20 INTERMITTENT ASTHMA, UNCOMPLICATED: ICD-10-CM

## 2017-08-16 DIAGNOSIS — E66.811 OBESITY, CLASS I, BMI 30-34.9: ICD-10-CM

## 2017-08-16 LAB
CHOLEST SERPL-MCNC: 183 MG/DL
GLUCOSE SERPL-MCNC: 92 MG/DL (ref 70–99)
HDLC SERPL-MCNC: 76 MG/DL
LDLC SERPL CALC-MCNC: 96 MG/DL
NONHDLC SERPL-MCNC: 107 MG/DL
TRIGL SERPL-MCNC: 56 MG/DL

## 2017-08-16 PROCEDURE — 87491 CHLMYD TRACH DNA AMP PROBE: CPT | Performed by: PREVENTIVE MEDICINE

## 2017-08-16 PROCEDURE — 86706 HEP B SURFACE ANTIBODY: CPT | Performed by: PREVENTIVE MEDICINE

## 2017-08-16 PROCEDURE — G0145 SCR C/V CYTO,THINLAYER,RESCR: HCPCS | Performed by: PREVENTIVE MEDICINE

## 2017-08-16 PROCEDURE — 82947 ASSAY GLUCOSE BLOOD QUANT: CPT | Performed by: PREVENTIVE MEDICINE

## 2017-08-16 PROCEDURE — 87389 HIV-1 AG W/HIV-1&-2 AB AG IA: CPT | Performed by: PREVENTIVE MEDICINE

## 2017-08-16 PROCEDURE — 86780 TREPONEMA PALLIDUM: CPT | Performed by: PREVENTIVE MEDICINE

## 2017-08-16 PROCEDURE — 87591 N.GONORRHOEAE DNA AMP PROB: CPT | Performed by: PREVENTIVE MEDICINE

## 2017-08-16 PROCEDURE — 99395 PREV VISIT EST AGE 18-39: CPT | Performed by: PREVENTIVE MEDICINE

## 2017-08-16 PROCEDURE — G0499 HEPB SCREEN HIGH RISK INDIV: HCPCS | Performed by: PREVENTIVE MEDICINE

## 2017-08-16 PROCEDURE — 80061 LIPID PANEL: CPT | Performed by: PREVENTIVE MEDICINE

## 2017-08-16 PROCEDURE — 36415 COLL VENOUS BLD VENIPUNCTURE: CPT | Performed by: PREVENTIVE MEDICINE

## 2017-08-16 PROCEDURE — 86803 HEPATITIS C AB TEST: CPT | Performed by: PREVENTIVE MEDICINE

## 2017-08-16 NOTE — PROGRESS NOTES
SUBJECTIVE:   CC: Ayesha Casas is an 26 year old woman who presents for preventive health visit.     Healthy Habits:    Do you get at least three servings of calcium containing foods daily (dairy, green leafy vegetables, etc.)? no    Amount of exercise or daily activities, outside of work: none    Problems taking medications regularly No    Medication side effects: Yes Lorazepam- shakiness, dizziness, fatigue    Have you had an eye exam in the past two years? no    Do you see a dentist twice per year? no  Do you have sleep apnea, excessive snoring or daytime drowsiness?yes- daytime drowsiness and sleep concern    PROBLEMS TO ADD ON:     Weight concern- would like to get medication, we discussed lifestyle modifications and referred patient to the Nutritionist,  would like labs for diabetes check    Today's PHQ-2 Score:   PHQ-2 ( 1999 Pf[employer]) 8/16/2017 12/10/2012   Q1: Little interest or pleasure in doing things 1 3   Q2: Feeling down, depressed or hopeless 1 0   PHQ-2 Score 2 3     Abuse: Current or Past(Physical, Sexual or Emotional)- No  Do you feel safe in your environment - Yes  Social History   Substance Use Topics     Smoking status: Current Every Day Smoker     Packs/day: 0.20     Types: Cigarettes     Smokeless tobacco: Never Used     Alcohol use Yes      Comment: beer 2/d     The patient does not drink >3 drinks per day nor >7 drinks per week.    Reviewed orders with patient.  Reviewed health maintenance and updated orders accordingly - Yes  Labs reviewed in EPIC  BP Readings from Last 3 Encounters:   08/16/17 107/73   04/25/17 116/85   01/17/17 114/76    Wt Readings from Last 3 Encounters:   08/16/17 151 lb 6.4 oz (68.7 kg)   04/25/17 150 lb 3.2 oz (68.1 kg)   01/17/17 145 lb (65.8 kg)                  Patient Active Problem List   Diagnosis     CARDIOVASCULAR SCREENING; LDL GOAL LESS THAN 160     Anxiety     Tobacco use disorder     Adjustment disorder with anxious mood     Borderline personality  disorder     Intermittent asthma     Past Surgical History:   Procedure Laterality Date     NO HISTORY OF SURGERY         Social History   Substance Use Topics     Smoking status: Current Every Day Smoker     Packs/day: 0.20     Types: Cigarettes     Smokeless tobacco: Never Used     Alcohol use Yes      Comment: beer 2/d     Family History   Problem Relation Age of Onset     Hypertension Maternal Grandmother      DIABETES Maternal Grandfather      Cancer - colorectal Maternal Grandfather      Breast Cancer Maternal Aunt          Current Outpatient Prescriptions   Medication Sig Dispense Refill     benzocaine (ORAJEL/ANBESOL) 10 % GEL Take by mouth 4 times daily as needed for moderate pain 9 g 0     LORazepam (ATIVAN) 1 MG tablet Take 1 tablet (1 mg) by mouth daily as needed for anxiety 30 tablet 0     albuterol (PROAIR HFA/PROVENTIL HFA/VENTOLIN HFA) 108 (90 BASE) MCG/ACT Inhaler Inhale 2 puffs into the lungs every 6 hours 1 Inhaler 5     ibuprofen 200 MG capsule Take 200 mg by mouth every 4 hours as needed       cetirizine (ZYRTEC) 10 MG tablet Take 1 tablet (10 mg) by mouth every evening 60 tablet 5     hydrocortisone (ANUSOL-HC) 2.5 % rectal cream Place rectally 2 times daily 30 g 1     sertraline (ZOLOFT) 50 MG tablet Take 0.5 tablets (25 mg) by mouth daily increase to 50 mg daily in 1 week (Patient not taking: Reported on 8/16/2017) 30 tablet 3     Allergies   Allergen Reactions     Penicillins            Mammogram not appropriate for this patient based on age.    Pertinent mammograms are reviewed under the imaging tab.  History of abnormal Pap smear: NO - age 21-29 PAP every 3 years recommended    Reviewed and updated as needed this visit by clinical staff  Tobacco  Allergies  Meds  Med Hx  Surg Hx  Fam Hx  Soc Hx        Reviewed and updated as needed this visit by Provider        Past Medical History:   Diagnosis Date     ADHD (attention deficit hyperactivity disorder) 1997    resolved     Anxiety 2003  "    Fibrocystic breast changes 2010     PTSD (post-traumatic stress disorder) 2003     Severe persistent asthma 2010     Tobacco use disorder       Past Surgical History:   Procedure Laterality Date     NO HISTORY OF SURGERY         ROS:  C: NEGATIVE for fever, chills, change in weight  I: NEGATIVE for worrisome rashes, moles or lesions  E: NEGATIVE for vision changes or irritation  ENT: NEGATIVE for ear, mouth and throat problems  R: NEGATIVE for significant cough or SOB  B: NEGATIVE for masses, tenderness or discharge  CV: NEGATIVE for chest pain, palpitations or peripheral edema  GI: NEGATIVE for nausea, abdominal pain, heartburn, or change in bowel habits  : NEGATIVE for unusual urinary or vaginal symptoms. Periods are regular.  M: NEGATIVE for significant arthralgias or myalgia  N: NEGATIVE for weakness, dizziness or paresthesias  E: NEGATIVE for temperature intolerance, skin/hair changes  H: NEGATIVE for bleeding problems  P: NEGATIVE for changes in mood or affect    OBJECTIVE:   /73 (BP Location: Left arm, Patient Position: Chair, Cuff Size: Adult Regular)  Pulse 79  Temp 98.8  F (37.1  C) (Oral)  Ht 4' 10.03\" (1.474 m)  Wt 151 lb 6.4 oz (68.7 kg)  LMP 08/02/2017 (Within Days)  SpO2 98%  BMI 31.61 kg/m2  EXAM:  GENERAL: healthy, alert and no distress  EYES: Eyes grossly normal to inspection, PERRL and conjunctivae and sclerae normal  HENT: ear canals and TM's normal, nose and mouth without ulcers or lesions  NECK: no adenopathy, no asymmetry, masses  RESP: lungs clear to auscultation - no rales, rhonchi or wheezes  BREAST: normal without masses, tenderness or nipple discharge and no palpable axillary masses or adenopathy  CV: regular rate and rhythm, normal S1 S2, no S3 or S4, no murmur, click or rub, no peripheral edema and peripheral pulses strong  ABDOMEN: soft, nontender, no hepatosplenomegaly, no masses    (female): normal female external genitalia, normal urethral meatus, vaginal mucosa " pink, moist, well rugated, and normal cervix/adnexa/uterus without masses or discharge  MS: no gross musculoskeletal defects noted, no edema  SKIN: no suspicious lesions or rashes  NEURO: Normal strength and tone, mentation intact and speech normal  PSYCH: mentation appears normal, affect normal/bright  LYMPH: no cervical, supraclavicular, axillary adenopathy    ASSESSMENT/PLAN:   Ayesha was seen today for physical.    Diagnoses and all orders for this visit:    Routine general medical examination at a health care facility  -     Lipid panel reflex to direct LDL  -     Glucose  -USPSTF guidelines reviewed     Screening for malignant neoplasm of cervix  -     Pap imaged thin layer screen reflex to HPV if ASCUS - recommend age 25 - 29  Screening guidelines reviewed     Intermittent asthma, uncomplicated    Screen for STD (sexually transmitted disease)  -     HIV Antigen Antibody Combo  -     Anti Treponema  -     Chlamydia trachomatis PCR  -     Neisseria gonorrhoeae PCR  -     Hepatitis C antibody  -     Hepatitis B Surface Antibody  -     Hepatitis B surface antigen    Obesity, Class I, BMI 30-34.9  -     NUTRITION REFERRAL    Tobacco use disorder  -Smokes one pack per day  -Pre contemplation stage of change      COUNSELING:   Reviewed preventive health counseling, as reflected in patient instructions       Regular exercise       Healthy diet/nutrition       Safe sex practices/STD prevention       HIV screeninx in teen years, 1x in adult years, and at intervals if high risk    BP Screening:   Last 3 BP Readings:    BP Readings from Last 3 Encounters:   17 107/73   17 116/85   17 114/76       The following was recommended to the patient:  Re-screen BP within a year and recommended lifestyle modifications     reports that she has been smoking Cigarettes.  She has been smoking about 0.20 packs per day. She has never used smokeless tobacco.  Tobacco Cessation Action Plan: Information offered:  "Patient not interested at this time  Estimated body mass index is 31.61 kg/(m^2) as calculated from the following:    Height as of this encounter: 4' 10.03\" (1.474 m).    Weight as of this encounter: 151 lb 6.4 oz (68.7 kg).   Weight management plan: Discussed healthy diet and exercise guidelines and patient will follow up in 12 months in clinic to re-evaluate.    Counseling Resources:  ATP IV Guidelines  Pooled Cohorts Equation Calculator  Breast Cancer Risk Calculator  FRAX Risk Assessment  ICSI Preventive Guidelines  Dietary Guidelines for Americans, 2010  USDA's MyPlate  ASA Prophylaxis  Lung CA Screening    Lisa Cummings MD MPH    [facility]  "

## 2017-08-16 NOTE — LETTER
August 23, 2017      Vinod Smith  [address]  [place] [address]            Dear Vinod Smith,    Cholesterol is at goal for you.   Glucose is normal, you do not have diabetes.  STD screening did not show any infections. This included HIV, Syphilis, Chlamydia, Gonorrhea, Hepatitis B and C. You are immune to Hepatitis B, likely from past vaccination.   Please let me know if you have any questions and thank you for choosing [facility].    Regards,    Lisa Pfeiffer MD MPH/ak        Results for orders placed or performed in visit on 08/16/17   Pap imaged thin layer screen reflex to HPV if ASCUS - recommend age 25 - 29   Result Value Ref Range    PAP NIL     Copath Report         Patient Name: VINOD SMITH  MR#: [id number]  Specimen #: [id number]  Collected: 8/16/2017  Received: 8/17/2017  Reported: 8/18/2017 15:39  Ordering Phy(s): LISA PFEIFFER    For improved result formatting, select 'View Enhanced Report Format'  under Linked Documents section.    SPECIMEN/STAIN PROCESS:  Pap imaged thin layer prep screening (Surepath, FocalPoint with guided  screening)       Pap-Cyto x 1, Pap with reflex to HPV if ASCUS x 1    SOURCE: Cervical, endocervical  ----------------------------------------------------------------   Pap imaged thin layer prep screening (Surepath, FocalPoint with guided  screening)  SPECIMEN ADEQUACY:  Satisfactory for evaluation.  -Transformation zone component present.    CYTOLOGIC INTERPRETATION:    Negative for intraepithelial lesion or malignancy    Electronically signed out by:  JHONY Duke  (ASCP)    Processed and screened at [facility],  [facility]    CLINICAL HISTORY:  LMP: 8/1/201 7  Previous normal pap  Date of Last Pap: 4/18/2013,    Papanicolaou Test Limitations:  Cervical cytology is a screening test  with limited sensitivity; regular screening is critical for cancer  prevention; Pap tests are primarily  effective for the  diagnosis/prevention of squamous cell carcinoma, not adenocarcinomas or  other cancers.    TESTING LAB LOCATION:  [address]  [phone]    COLLECTION SITE:  Client:  [facility], [place]  Location: [facility] (B)     HIV Antigen Antibody Combo   Result Value Ref Range    HIV Antigen Antibody Combo Nonreactive NR^Nonreactive       Anti Treponema   Result Value Ref Range    Treponema pallidum Antibody Negative NEG^Negative   Hepatitis C antibody   Result Value Ref Range    Hepatitis C Antibody Nonreactive NR^Nonreactive   Hepatitis B Surface Antibody   Result Value Ref Range    Hepatitis B Surface Antibody >1000.00 (H) <8.00 m[IU]/mL   Hepatitis B surface antigen   Result Value Ref Range    Hep B Surface Agn Nonreactive NR^Nonreactive   Lipid panel reflex to direct LDL   Result Value Ref Range    Cholesterol 183 <200 mg/dL    Triglycerides 56 <150 mg/dL    HDL Cholesterol 76 >49 mg/dL    LDL Cholesterol Calculated 96 <100 mg/dL    Non HDL Cholesterol 107 <130 mg/dL   Glucose   Result Value Ref Range    Glucose 92 70 - 99 mg/dL   Chlamydia trachomatis PCR   Result Value Ref Range    Specimen Description Cervix     Chlamydia Trachomatis PCR Negative NEG^Negative   Neisseria gonorrhoeae PCR   Result Value Ref Range    Specimen Descrip Cervix     N Gonorrhea PCR Negative NEG^Negative

## 2017-08-16 NOTE — MR AVS SNAPSHOT
After Visit Summary   8/16/2017    Ayesha Casas    MRN: [id number]           Patient Information     Date Of Birth          1990        Visit Information        Provider Department      8/16/2017 11:00 AM Lisa Cummings MD [facility]        Today's Diagnoses     Routine general medical examination at a health care facility    -  1    Screening for malignant neoplasm of cervix        Intermittent asthma, uncomplicated        Screen for STD (sexually transmitted disease)        Obesity, Class I, BMI 30-34.9        Tobacco use disorder          Care Instructions      Preventive Health Recommendations  Female Ages 26 - 39  Yearly exam:   See your health care provider every year in order to    Review health changes.     Discuss preventive care.      Review your medicines if you your doctor has prescribed any.    Until age 30: Get a Pap test every three years (more often if you have had an abnormal result).    After age 30: Talk to your doctor about whether you should have a Pap test every 3 years or have a Pap test with HPV screening every 5 years.   You do not need a Pap test if your uterus was removed (hysterectomy) and you have not had cancer.  You should be tested each year for STDs (sexually transmitted diseases), if you're at risk.   Talk to your provider about how often to have your cholesterol checked.  If you are at risk for diabetes, you should have a diabetes test (fasting glucose).  Shots: Get a flu shot each year. Get a tetanus shot every 10 years.   Nutrition:     Eat at least 5 servings of fruits and vegetables each day.    Eat whole-grain bread, whole-wheat pasta and brown rice instead of white grains and rice.    Talk to your provider about Calcium and Vitamin D.     Lifestyle    Exercise at least 150 minutes a week (30 minutes a day, 5 days of the week). This will help you control your weight and prevent disease.    Limit alcohol to one drink per day.    No  "smoking.     Wear sunscreen to prevent skin cancer.    See your dentist every six months for an exam and cleaning.            Follow-ups after your visit        Additional Services     NUTRITION REFERRAL       Your provider has referred you to: MARISEL: [facility] [phone]   [email or web address]    Please be aware that coverage of these services is subject to the terms and limitations of your health insurance plan.  Call member services at your health plan with any benefit or coverage questions.      Please bring the following with you to your appointment:    (1) This referral request  (2) Any documents given to you regarding this referral  (3) Any specific questions you have about diet and/or food choices                  Follow-up notes from your care team     Return in about 1 year (around 8/16/2018) for Routine Visit.      Who to contact     If you have questions or need follow up information about today's clinic visit or your schedule please contact [facility] directly at [phone].  Normal or non-critical lab and imaging results will be communicated to you by MyChart, letter or phone within 4 business days after the clinic has received the results. If you do not hear from us within 7 days, please contact the clinic through [facility] [employer] or phone. If you have a critical or abnormal lab result, we will notify you by phone as soon as possible.  Submit refill requests through [employer] or call your pharmacy and they will forward the refill request to us. Please allow 3 business days for your refill to be completed.          Additional Information About Your Visit        [facility] [employer] Information     [employer] lets you send messages to your doctor, view your test results, renew your prescriptions, schedule appointments and more. To sign up, go to [email or web address] . Click on \"Log in\" on the left side of the screen, which will take you to " "the Welcome page. Then click on \"Sign up Now\" on the right side of the page.     You will be asked to enter the access code listed below, as well as some personal information. Please follow the directions to create your username and password.     Your access code is: [email or web address]  Expires: 10/31/2017 10:21 AM     Your access code will  in 90 days. If you need help or a new code, please call your [facility] clinic or [phone].        Care EveryWhere ID     This is your Care EveryWhere ID. This could be used by other organizations to access your [facility] medical records  [id number]        Your Vitals Were     Pulse Temperature Height Last Period Pulse Oximetry BMI (Body Mass Index)    79 98.8  F (37.1  C) (Oral) 4' 10.03\" (1.474 m) 2017 (Within Days) 98% 31.61 kg/m2       Blood Pressure from Last 3 Encounters:   17 107/73   17 116/85   17 114/76    Weight from Last 3 Encounters:   17 151 lb 6.4 oz (68.7 kg)   17 150 lb 3.2 oz (68.1 kg)   17 145 lb (65.8 kg)              We Performed the Following     Anti Treponema     Chlamydia trachomatis PCR     Glucose     Hepatitis B Surface Antibody     Hepatitis B surface antigen     Hepatitis C antibody     HIV Antigen Antibody Combo     Lipid panel reflex to direct LDL     Neisseria gonorrhoeae PCR     NUTRITION REFERRAL     Pap imaged thin layer screen reflex to HPV if ASCUS - recommend age 25 - 29        Primary Care Provider Office Phone # Fax #    Mulu Mcmillan PA-C [phone]       [address] [place]  [facility] [address]        Equal Access to Services     [facility]: Hadii smith Guzman, wamarcelada luqadaha, qaybta kaalmada noel, rj estrada. So [facility] [phone].    ATENCIÓN: Si habla español, tiene a norman disposición servicios gratuitos de asistencia lingüística. Llame al [phone].    We comply with applicable federal civil rights laws and [place] " laws. We do not discriminate on the basis of race, color, national origin, age, disability sex, sexual orientation or gender identity.            Thank you!     Thank you for choosing [facility]  for your care. Our goal is always to provide you with excellent care. Hearing back from our patients is one way we can continue to improve our services. Please take a few minutes to complete the written survey that you may receive in the mail after your visit with us. Thank you!             Your Updated Medication List - Protect others around you: Learn how to safely use, store and throw away your medicines at www.disposemymeds.org.          This list is accurate as of: 8/16/17 12:23 PM.  Always use your most recent med list.                   Brand Name Dispense Instructions for use Diagnosis    albuterol 108 (90 BASE) MCG/ACT Inhaler    PROAIR HFA/PROVENTIL HFA/VENTOLIN HFA    1 Inhaler    Inhale 2 puffs into the lungs every 6 hours    Intermittent asthma, with acute exacerbation       benzocaine 10 % Gel    ORAJEL/ANBESOL    9 g    Take by mouth 4 times daily as needed for moderate pain    Canker sore       cetirizine 10 MG tablet    zyrTEC    60 tablet    Take 1 tablet (10 mg) by mouth every evening    Seasonal allergies       hydrocortisone 2.5 % cream    ANUSOL-HC    30 g    Place rectally 2 times daily    External hemorrhoids       ibuprofen 200 MG capsule      Take 200 mg by mouth every 4 hours as needed        LORazepam 1 MG tablet    ATIVAN    30 tablet    Take 1 tablet (1 mg) by mouth daily as needed for anxiety    Adjustment disorder with anxious mood       sertraline 50 MG tablet    ZOLOFT    30 tablet    Take 0.5 tablets (25 mg) by mouth daily increase to 50 mg daily in 1 week    Adjustment disorder with anxious mood

## 2017-08-16 NOTE — NURSING NOTE
"Chief Complaint   Patient presents with     Physical       Initial /73 (BP Location: Left arm, Patient Position: Chair, Cuff Size: Adult Regular)  Pulse 79  Temp 98.8  F (37.1  C) (Oral)  Ht 4' 10.03\" (1.474 m)  Wt 151 lb 6.4 oz (68.7 kg)  LMP 08/02/2017 (Within Days)  SpO2 98%  BMI 31.61 kg/m2 Estimated body mass index is 31.61 kg/(m^2) as calculated from the following:    Height as of this encounter: 4' 10.03\" (1.474 m).    Weight as of this encounter: 151 lb 6.4 oz (68.7 kg).  Medication Reconciliation: complete   Devi Ortiz CMA      "

## 2017-08-16 NOTE — LETTER
August 23, 2017      Ayesha Casas  [address]  [place] [address]    Dear ,      I am happy to inform you that your recent cervical cancer screening test (PAP smear) was normal.      Preventative screenings such as this help to ensure your health for years to come. You should repeat a pap smear in 3 years, unless otherwise directed.      You will still need to return to the clinic every year for your annual exam and other preventive tests.     Please contact the clinic at [phone] if you have further questions.       Sincerely,      Lisa Cummings MD/tawny

## 2017-08-17 LAB
C TRACH DNA SPEC QL NAA+PROBE: NEGATIVE
N GONORRHOEA DNA SPEC QL NAA+PROBE: NEGATIVE
SPECIMEN SOURCE: NORMAL
SPECIMEN SOURCE: NORMAL
T PALLIDUM IGG+IGM SER QL: NEGATIVE

## 2017-08-17 ASSESSMENT — ASTHMA QUESTIONNAIRES: ACT_TOTALSCORE: 20

## 2017-08-18 ENCOUNTER — NURSE TRIAGE (OUTPATIENT)
Dept: NURSING | Facility: CLINIC | Age: 27
End: 2017-08-18

## 2017-08-18 LAB
COPATH REPORT: NORMAL
HBV SURFACE AB SERPL IA-ACNC: >1000 M[IU]/ML
HBV SURFACE AG SERPL QL IA: NONREACTIVE
HCV AB SERPL QL IA: NONREACTIVE
HIV 1+2 AB+HIV1 P24 AG SERPL QL IA: NONREACTIVE
PAP: NORMAL

## 2017-08-18 NOTE — TELEPHONE ENCOUNTER
Additional Information    Negative: [1] Caller is not with the adult (patient) AND [2] reporting urgent symptoms    Negative: Lab result questions    Negative: Medication questions    Negative: Caller cannot be reached by phone    Negative: Caller has already spoken to PCP or another triager    Negative: RN needs further essential information from caller in order to complete triage    Negative: Requesting regular office appointment    Negative: [1] Caller requesting NON-URGENT health information AND [2] PCP's office is the best resource    Health Information question, no triage required and triager able to answer question     Patient calling for recent lab results. Gave normal results per epic. Some labs are still in process. Advised to call back.    Protocols used: INFORMATION ONLY [facility]

## 2017-08-23 NOTE — PROGRESS NOTES
Please send a letter:    Dear Ayesha Casas,    Cholesterol is at goal for you.   Glucose is normal, you do not have diabetes.  STD screening did not show any infections. This included HIV, Syphilis, Chlamydia, Gonorrhea, Hepatitis B and C. You are immune to Hepatitis B, likely from past vaccination.   Please let me know if you have any questions and thank you for choosing [facility].    Regards,    Lisa Cummings MD MPH

## 2017-12-15 ENCOUNTER — OFFICE VISIT (OUTPATIENT)
Dept: FAMILY MEDICINE | Facility: CLINIC | Age: 27
End: 2017-12-15
Payer: MEDICARE

## 2017-12-15 VITALS
TEMPERATURE: 98.8 F | DIASTOLIC BLOOD PRESSURE: 76 MMHG | HEIGHT: 58 IN | WEIGHT: 151 LBS | BODY MASS INDEX: 31.7 KG/M2 | HEART RATE: 76 BPM | OXYGEN SATURATION: 99 % | SYSTOLIC BLOOD PRESSURE: 119 MMHG

## 2017-12-15 DIAGNOSIS — E66.811 OBESITY, CLASS I, BMI 30-34.9: ICD-10-CM

## 2017-12-15 DIAGNOSIS — J45.21 INTERMITTENT ASTHMA, WITH ACUTE EXACERBATION: ICD-10-CM

## 2017-12-15 DIAGNOSIS — F43.22 ADJUSTMENT DISORDER WITH ANXIOUS MOOD: Primary | ICD-10-CM

## 2017-12-15 DIAGNOSIS — L73.9 FOLLICULITIS: ICD-10-CM

## 2017-12-15 DIAGNOSIS — L30.9 DERMATITIS: ICD-10-CM

## 2017-12-15 PROCEDURE — 99214 OFFICE O/P EST MOD 30 MIN: CPT | Performed by: PREVENTIVE MEDICINE

## 2017-12-15 RX ORDER — ALBUTEROL SULFATE 90 UG/1
2 AEROSOL, METERED RESPIRATORY (INHALATION) EVERY 6 HOURS
Qty: 1 INHALER | Refills: 5 | Status: SHIPPED | OUTPATIENT
Start: 2017-12-15 | End: 2018-09-28

## 2017-12-15 RX ORDER — LORAZEPAM 1 MG/1
1 TABLET ORAL DAILY PRN
Qty: 30 TABLET | Refills: 0 | Status: CANCELLED | OUTPATIENT
Start: 2017-12-15

## 2017-12-15 RX ORDER — OMEGA-3 FATTY ACIDS/FISH OIL 300-1000MG
200 CAPSULE ORAL EVERY 4 HOURS PRN
Qty: 120 CAPSULE | Status: CANCELLED | OUTPATIENT
Start: 2017-12-15

## 2017-12-15 RX ORDER — IBUPROFEN 600 MG/1
600 TABLET, FILM COATED ORAL EVERY 8 HOURS PRN
Qty: 30 TABLET | Refills: 1 | Status: SHIPPED | OUTPATIENT
Start: 2017-12-15 | End: 2023-02-13

## 2017-12-15 RX ORDER — TRIAMCINOLONE ACETONIDE 1 MG/G
CREAM TOPICAL
Qty: 30 G | Refills: 0 | Status: SHIPPED | OUTPATIENT
Start: 2017-12-15 | End: 2023-10-25

## 2017-12-15 RX ORDER — MUPIROCIN 20 MG/G
OINTMENT TOPICAL 3 TIMES DAILY
Qty: 22 G | Refills: 1 | Status: SHIPPED | OUTPATIENT
Start: 2017-12-15 | End: 2017-12-20

## 2017-12-15 ASSESSMENT — PAIN SCALES - GENERAL: PAINLEVEL: NO PAIN (0)

## 2017-12-15 NOTE — PROGRESS NOTES
SUBJECTIVE:   Ayesha Casas is a 27 year old female who presents to clinic today for the following health issues:      Rash      Duration: x 2 weeks    Description  Location: face  Itching: no    Intensity:  moderate    Accompanying signs and symptoms: None    History (similar episodes/previous evaluation): None    Precipitating or alleviating factors:  New exposures:  None  Recent travel: no      Therapies tried and outcome: jannet butter    Better today    No pictures of rash    No severe itching    No drainage    No pustules    Patches and red bumps    No adult onset acne    Some increase when about to menstruate     Vaginal Symptoms      Duration: x 2 weeks    Description  lump    Intensity:  moderate    Accompanying signs and symptoms (fever/dysuria/abdominal or back pain): None    History  Sexually active: yes, single partner, contraception - none  Possibility of pregnancy: No  Recent antibiotic use: no     Precipitating or alleviating factors: None    Therapies tried and outcome: none   Outcome: NA    Was shaving 2 weeks ago, more inflamed thereafter    Itching+    No bleeding    No discharge    No purulent drainage         Asthma Follow-Up    Was ACT completed today?  No      Respiratory symptoms:   Cough: No   Wheezing: No   Shortness of breath: No    Use of short- acting(rescue) inhaler: as needed    Taking controlled (daily) meds as prescribed: not on daily medication     ER/UC visits or hospital admissions since last visit: none     Recent asthma triggers that patient is dealing with: None       Anxiety:  -Requesting refills on medication  -Symptoms are stable  -No suicidal ideation     Problem list and histories reviewed & adjusted, as indicated.  Additional history: as documented    Patient Active Problem List   Diagnosis     CARDIOVASCULAR SCREENING; LDL GOAL LESS THAN 160     Anxiety     Tobacco use disorder     Adjustment disorder with anxious mood     Borderline personality disorder      Intermittent asthma     Past Surgical History:   Procedure Laterality Date     NO HISTORY OF SURGERY         Social History   Substance Use Topics     Smoking status: Current Every Day Smoker     Packs/day: 0.20     Types: Cigarettes     Smokeless tobacco: Never Used     Alcohol use Yes      Comment: beer 2/d     Family History   Problem Relation Age of Onset     Hypertension Maternal Grandmother      DIABETES Maternal Grandfather      Cancer - colorectal Maternal Grandfather      Breast Cancer Maternal Aunt          Current Outpatient Prescriptions   Medication Sig Dispense Refill     sertraline (ZOLOFT) 50 MG tablet Take 1 tablet (50 mg) by mouth daily 30 tablet 3     albuterol (PROAIR HFA/PROVENTIL HFA/VENTOLIN HFA) 108 (90 BASE) MCG/ACT Inhaler Inhale 2 puffs into the lungs every 6 hours 1 Inhaler 5     ibuprofen (ADVIL/MOTRIN) 600 MG tablet Take 1 tablet (600 mg) by mouth every 8 hours as needed for moderate pain 30 tablet 1     triamcinolone (KENALOG) 0.1 % cream Apply sparingly to affected area three times daily for 14 days. 30 g 0     mupirocin (BACTROBAN) 2 % ointment Apply topically 3 times daily for 5 days 22 g 1     LORazepam (ATIVAN) 1 MG tablet Take 1 tablet (1 mg) by mouth daily as needed for anxiety 30 tablet 0     ibuprofen 200 MG capsule Take 200 mg by mouth every 4 hours as needed       hydrocortisone (ANUSOL-HC) 2.5 % rectal cream Place rectally 2 times daily 30 g 1     [DISCONTINUED] sertraline (ZOLOFT) 50 MG tablet Take 0.5 tablets (25 mg) by mouth daily increase to 50 mg daily in 1 week 30 tablet 3     [DISCONTINUED] albuterol (PROAIR HFA/PROVENTIL HFA/VENTOLIN HFA) 108 (90 BASE) MCG/ACT Inhaler Inhale 2 puffs into the lungs every 6 hours 1 Inhaler 5     Allergies   Allergen Reactions     Penicillins      BP Readings from Last 3 Encounters:   12/15/17 119/76   08/16/17 107/73   04/25/17 116/85    Wt Readings from Last 3 Encounters:   12/15/17 151 lb (68.5 kg)   08/16/17 151 lb 6.4 oz (68.7 kg)  "  04/25/17 150 lb 3.2 oz (68.1 kg)                        [facility] and [facility]  [facility]       Reviewed and updated as needed this visit by Provider         ROS:  Constitutional, HEENT, cardiovascular, pulmonary, gi and gu systems are negative, except as otherwise noted.      OBJECTIVE:                                                    /76  Pulse 76  Temp 98.8  F (37.1  C) (Oral)  Ht 4' 10\" (1.473 m)  Wt 151 lb (68.5 kg)  LMP 11/13/2017 (Exact Date)  SpO2 99%  Breastfeeding? No  BMI 31.56 kg/m2  Body mass index is 31.56 kg/(m^2).  GENERAL APPEARANCE: healthy, alert and no distress  EYES: Eyes grossly normal to inspection and conjunctivae and sclerae normal  NECK: no adenopathy, no asymmetry, masses, or scars and trachea midline and normal to palpation  RESP: lungs clear to auscultation - no rales, rhonchi or wheezes  CV: regular rates and rhythm, normal S1 S2, no S3 or S4, no irregular beats and peripheral pulses strong  ABDOMEN: soft, non-tender   (female): small cyst noted on clitoris. 0.5 cms cyst draining pus noted lateral to left labia majora. No fluctuance. Slight tenderness+  MS: extremities normal- no gross deformities noted  SKIN: Xerosis patches on the face, slight scaly hyperpigmented patches on the sides of the neck  NEURO: Normal strength and tone, mentation intact and speech normal  PSYCH: mentation appears normal    Diagnostic test results:  Diagnostic Test Results:  No results found for this or any previous visit (from the past 24 hour(s)).       ASSESSMENT/PLAN:                                                    1. Adjustment disorder with anxious mood  -Advised to take 50 mg instead of 25 mg   - sertraline (ZOLOFT) 50 MG tablet; Take 1 tablet (50 mg) by mouth daily  Dispense: 30 tablet; Refill: 3    2. Intermittent asthma, with acute exacerbation  -Stable  - albuterol (PROAIR HFA/PROVENTIL HFA/VENTOLIN HFA) 108 (90 BASE) MCG/ACT " Inhaler; Inhale 2 puffs into the lungs every 6 hours  Dispense: 1 Inhaler; Refill: 5    3. Obesity, Class I, BMI 30-34.9  -Missed appointment with Nutrition  - ibuprofen (ADVIL/MOTRIN) 600 MG tablet; Take 1 tablet (600 mg) by mouth every 8 hours as needed for moderate pain  Dispense: 30 tablet; Refill: 1, GI side effects reviewed  -Discussed portion control, weight watchers     4. Folliculitis  -Warm compresses  -If not better in one week may need oral antibiotics.   - mupirocin (BACTROBAN) 2 % ointment; Apply topically 3 times daily for 5 days  Dispense: 22 g; Refill: 1    5. Dermatitis  -Vaseline for the dry skin on the face  -kenalog on the neck   - triamcinolone (KENALOG) 0.1 % cream; Apply sparingly to affected area three times daily for 14 days.  Dispense: 30 g; Refill: 0      Follow up with Provider - If not better in one week     Lisa Cummings MD MPH    [facility]

## 2017-12-15 NOTE — MR AVS SNAPSHOT
After Visit Summary   12/15/2017    Ayesha Casas    MRN: [id number]           Patient Information     Date Of Birth          1990        Visit Information        Provider Department      12/15/2017 9:20 AM Lisa Cummings MD [facility]        Today's Diagnoses     Adjustment disorder with anxious mood    -  1    Intermittent asthma, with acute exacerbation        External hemorrhoids        Obesity, Class I, BMI 30-34.9        Folliculitis        Dermatitis          Care Instructions    At [facility], we strive to deliver an exceptional experience to you, every time we see you.  If you receive a survey in the mail, please send us back your thoughts. We really do value your feedback.    Based on your medical history, these are the current health maintenance/preventive care services that you are due for (some may have been done at this visit.)  Health Maintenance Due   Topic Date Due     INFLUENZA VACCINE (SYSTEM ASSIGNED)  09/01/2017         Suggested websites for health information:  Www.[employer] : Up to date and easily searchable information on multiple topics.  Www.medlineplus.gov : medication info, interactive tutorials, watch real surgeries online  Www.familydoctor.org : good info from the Academy of Family Physicians  Www.cdc.gov : public health info, travel advisories, epidemics (H1N1)  Www.aap.org : children's health info, normal development, vaccinations  Www.health[place] : [place] dept of health, public health issues in [place], N1N1    Your care team:                            Family Medicine Internal Medicine   MD Marcus Padron MD Shantel Branch-Fleming, MD Katya Georgiev PA-C Nam Ho, MD Pediatrics   PRITESH López, JHONATAN Veram APRN MD Anita Orourke MD Deborah Mielke, MD Kim Thein, APRN CNP      Clinic hours: Monday - Thursday 7 am-7 pm; Fridays 7 am-5 pm.  "  Urgent care: Monday - Friday 11 am-9 pm; Saturday and  9 am-5 pm.  Pharmacy : Monday -Thursday 8 am-8 pm; Friday 8 am-6 pm; Saturday and  9 am-5 pm.     Clinic: [phone]   Pharmacy: [phone]              Follow-ups after your visit        Who to contact     If you have questions or need follow up information about today's clinic visit or your schedule please contact [facility] directly at [phone].  Normal or non-critical lab and imaging results will be communicated to you by MyChart, letter or phone within 4 business days after the clinic has received the results. If you do not hear from us within 7 days, please contact the clinic through [employer] or phone. If you have a critical or abnormal lab result, we will notify you by phone as soon as possible.  Submit refill requests through [employer] or call your pharmacy and they will forward the refill request to us. Please allow 3 business days for your refill to be completed.          Additional Information About Your Visit        MyChart Information     [employer] lets you send messages to your doctor, view your test results, renew your prescriptions, schedule appointments and more. To sign up, go to [email or web address] . Click on \"Log in\" on the left side of the screen, which will take you to the Welcome page. Then click on \"Sign up Now\" on the right side of the page.     You will be asked to enter the access code listed below, as well as some personal information. Please follow the directions to create your username and password.     Your access code is: [email or web address]  Expires: 3/15/2018  9:56 AM     Your access code will  in 90 days. If you need help or a new code, please call your [facility] or [phone].        Care EveryWhere ID     This is your Care EveryWhere ID. This could be used by other organizations to access your [facility] medical records  [id number]        Your Vitals Were     Pulse " "Temperature Height Last Period Pulse Oximetry Breastfeeding?    76 98.8  F (37.1  C) (Oral) 4' 10\" (1.473 m) 11/13/2017 (Exact Date) 99% No    BMI (Body Mass Index)                   31.56 kg/m2            Blood Pressure from Last 3 Encounters:   12/15/17 119/76   08/16/17 107/73   04/25/17 116/85    Weight from Last 3 Encounters:   12/15/17 151 lb (68.5 kg)   08/16/17 151 lb 6.4 oz (68.7 kg)   04/25/17 150 lb 3.2 oz (68.1 kg)              Today, you had the following     No orders found for display         Today's Medication Changes          These changes are accurate as of: 12/15/17  9:57 AM.  If you have any questions, ask your nurse or doctor.               Start taking these medicines.        Dose/Directions    mupirocin 2 % ointment   Commonly known as:  BACTROBAN   Used for:  Folliculitis   Started by:  Lisa Cummings MD        Apply topically 3 times daily for 5 days   Quantity:  22 g   Refills:  1       triamcinolone 0.1 % cream   Commonly known as:  KENALOG   Used for:  Dermatitis   Started by:  Lisa Cummings MD        Apply sparingly to affected area three times daily for 14 days.   Quantity:  30 g   Refills:  0         These medicines have changed or have updated prescriptions.        Dose/Directions    * ibuprofen 200 MG capsule   This may have changed:  Another medication with the same name was added. Make sure you understand how and when to take each.   Changed by:  Og Jaquez MD        Dose:  200 mg   Take 200 mg by mouth every 4 hours as needed   Refills:  0       * ibuprofen 600 MG tablet   Commonly known as:  ADVIL/MOTRIN   This may have changed:  You were already taking a medication with the same name, and this prescription was added. Make sure you understand how and when to take each.   Used for:  Obesity, Class I, BMI 30-34.9   Changed by:  Lisa Cummings MD        Dose:  600 mg   Take 1 tablet (600 mg) by mouth every 8 hours as needed for moderate pain   Quantity:  30 tablet "   Refills:  1       sertraline 50 MG tablet   Commonly known as:  ZOLOFT   This may have changed:    - how much to take  - additional instructions   Used for:  Adjustment disorder with anxious mood   Changed by:  Lisa Cummings MD        Dose:  50 mg   Take 1 tablet (50 mg) by mouth daily   Quantity:  30 tablet   Refills:  3       * Notice:  This list has 2 medication(s) that are the same as other medications prescribed for you. Read the directions carefully, and ask your doctor or other care provider to review them with you.      Stop taking these medicines if you haven't already. Please contact your care team if you have questions.     benzocaine 10 % Gel   Commonly known as:  ORAJEL/ANBESOL   Stopped by:  Lisa Cummings MD           cetirizine 10 MG tablet   Commonly known as:  zyrTEC   Stopped by:  Lisa Cummings MD                Where to get your medicines      These medications were sent to [facility] - [place], [place] - [address]  [address], [facility] [address]     Phone:  [phone]     albuterol 108 (90 BASE) MCG/ACT Inhaler    ibuprofen 600 MG tablet    mupirocin 2 % ointment    sertraline 50 MG tablet    triamcinolone 0.1 % cream                Primary Care Provider Office Phone # Fax #    Mulu Mcmillan PA-C [phone]       [address]  [facility] [address]        Equal Access to Services     [facility] AH: Hadii aad ku hadasho Soomaali, waaxda luqadaha, qaybta kaalmada adeegyada, waxay idiin hayaan marc osman . So [facility] [phone].    ATENCIÓN: Si habla español, tiene a norman disposición servicios gratuitos de asistencia lingüística. Jake al [phone].    We comply with applicable federal civil rights laws and [place] laws. We do not discriminate on the basis of race, color, national origin, age, disability, sex, sexual orientation, or gender identity.            Thank you!     Thank you for choosing [facility]  for your care. Our goal is always to provide you with excellent care. Hearing back from our patients is one way we can continue to improve our services. Please take a few minutes to complete the written survey that you may receive in the mail after your visit with us. Thank you!             Your Updated Medication List - Protect others around you: Learn how to safely use, store and throw away your medicines at www.disposemymeds.org.          This list is accurate as of: 12/15/17  9:57 AM.  Always use your most recent med list.                   Brand Name Dispense Instructions for use Diagnosis    albuterol 108 (90 BASE) MCG/ACT Inhaler    PROAIR HFA/PROVENTIL HFA/VENTOLIN HFA    1 Inhaler    Inhale 2 puffs into the lungs every 6 hours    Intermittent asthma, with acute exacerbation       hydrocortisone 2.5 % cream    ANUSOL-HC    30 g    Place rectally 2 times daily    External hemorrhoids       * ibuprofen 200 MG capsule      Take 200 mg by mouth every 4 hours as needed        * ibuprofen 600 MG tablet    ADVIL/MOTRIN    30 tablet    Take 1 tablet (600 mg) by mouth every 8 hours as needed for moderate pain    Obesity, Class I, BMI 30-34.9       LORazepam 1 MG tablet    ATIVAN    30 tablet    Take 1 tablet (1 mg) by mouth daily as needed for anxiety    Adjustment disorder with anxious mood       mupirocin 2 % ointment    BACTROBAN    22 g    Apply topically 3 times daily for 5 days    Folliculitis       sertraline 50 MG tablet    ZOLOFT    30 tablet    Take 1 tablet (50 mg) by mouth daily    Adjustment disorder with anxious mood       triamcinolone 0.1 % cream    KENALOG    30 g    Apply sparingly to affected area three times daily for 14 days.    Dermatitis       * Notice:  This list has 2 medication(s) that are the same as other medications prescribed for you. Read the directions carefully, and ask your doctor or other care provider to review them with you.

## 2017-12-15 NOTE — PATIENT INSTRUCTIONS
At [facility], we strive to deliver an exceptional experience to you, every time we see you.  If you receive a survey in the mail, please send us back your thoughts. We really do value your feedback.    Based on your medical history, these are the current health maintenance/preventive care services that you are due for (some may have been done at this visit.)  Health Maintenance Due   Topic Date Due     INFLUENZA VACCINE (SYSTEM ASSIGNED)  09/01/2017         Suggested websites for health information:  Www.[employer]org : Up to date and easily searchable information on multiple topics.  Www.medlineplus.gov : medication info, interactive tutorials, watch real surgeries online  Www.familydoctor.org : good info from the Academy of Family Physicians  Www.cdc.gov : public health info, travel advisories, epidemics (H1N1)  Www.aap.org : children's health info, normal development, vaccinations  Www.health.[facility].[place].us : [place] dept of health, public health issues in [place], N1N1    Your care team:                            Family Medicine Internal Medicine   MD Marcus Padron MD Shantel Branch-Fleming, MD Katya Georgiev PA-C Nam Ho, MD Pediatrics   Yash Rice PABENTON Downing, CNP Kelly Verma APRN CNP   MD Anita Gomez MD Deborah Mielke, MD Kim Thein, APRN CNP      Clinic hours: Monday - Thursday 7 am-7 pm; Fridays 7 am-5 pm.   Urgent care: Monday - Friday 11 am-9 pm; Saturday and Sunday 9 am-5 pm.  Pharmacy : Monday -Thursday 8 am-8 pm; Friday 8 am-6 pm; Saturday and Sunday 9 am-5 pm.     Clinic: [phone]   Pharmacy: [phone]

## 2018-03-07 PROBLEM — E66.811 OBESITY, CLASS I, BMI 30-34.9: Status: ACTIVE | Noted: 2018-03-07

## 2018-05-30 ENCOUNTER — OFFICE VISIT (OUTPATIENT)
Dept: FAMILY MEDICINE | Facility: CLINIC | Age: 28
End: 2018-05-30
Payer: MEDICARE

## 2018-05-30 ENCOUNTER — TELEPHONE (OUTPATIENT)
Dept: FAMILY MEDICINE | Facility: CLINIC | Age: 28
End: 2018-05-30

## 2018-05-30 VITALS
SYSTOLIC BLOOD PRESSURE: 111 MMHG | WEIGHT: 154 LBS | BODY MASS INDEX: 32.32 KG/M2 | HEART RATE: 82 BPM | HEIGHT: 58 IN | OXYGEN SATURATION: 99 % | DIASTOLIC BLOOD PRESSURE: 70 MMHG | TEMPERATURE: 97.8 F

## 2018-05-30 DIAGNOSIS — R07.0 THROAT PAIN: Primary | ICD-10-CM

## 2018-05-30 DIAGNOSIS — F43.22 ADJUSTMENT DISORDER WITH ANXIOUS MOOD: ICD-10-CM

## 2018-05-30 DIAGNOSIS — Z11.3 ROUTINE SCREENING FOR STI (SEXUALLY TRANSMITTED INFECTION): ICD-10-CM

## 2018-05-30 LAB
DEPRECATED S PYO AG THROAT QL EIA: NORMAL
SPECIMEN SOURCE: NORMAL

## 2018-05-30 PROCEDURE — 86803 HEPATITIS C AB TEST: CPT | Performed by: NURSE PRACTITIONER

## 2018-05-30 PROCEDURE — 87081 CULTURE SCREEN ONLY: CPT | Performed by: NURSE PRACTITIONER

## 2018-05-30 PROCEDURE — 87389 HIV-1 AG W/HIV-1&-2 AB AG IA: CPT | Performed by: NURSE PRACTITIONER

## 2018-05-30 PROCEDURE — 36415 COLL VENOUS BLD VENIPUNCTURE: CPT | Performed by: NURSE PRACTITIONER

## 2018-05-30 PROCEDURE — 87880 STREP A ASSAY W/OPTIC: CPT | Performed by: NURSE PRACTITIONER

## 2018-05-30 PROCEDURE — 87491 CHLMYD TRACH DNA AMP PROBE: CPT | Performed by: NURSE PRACTITIONER

## 2018-05-30 PROCEDURE — 87591 N.GONORRHOEAE DNA AMP PROB: CPT | Performed by: NURSE PRACTITIONER

## 2018-05-30 PROCEDURE — 86780 TREPONEMA PALLIDUM: CPT | Performed by: NURSE PRACTITIONER

## 2018-05-30 PROCEDURE — 99214 OFFICE O/P EST MOD 30 MIN: CPT | Performed by: NURSE PRACTITIONER

## 2018-05-30 ASSESSMENT — ANXIETY QUESTIONNAIRES
1. FEELING NERVOUS, ANXIOUS, OR ON EDGE: NEARLY EVERY DAY
IF YOU CHECKED OFF ANY PROBLEMS ON THIS QUESTIONNAIRE, HOW DIFFICULT HAVE THESE PROBLEMS MADE IT FOR YOU TO DO YOUR WORK, TAKE CARE OF THINGS AT HOME, OR GET ALONG WITH OTHER PEOPLE: VERY DIFFICULT
3. WORRYING TOO MUCH ABOUT DIFFERENT THINGS: NEARLY EVERY DAY
7. FEELING AFRAID AS IF SOMETHING AWFUL MIGHT HAPPEN: SEVERAL DAYS
5. BEING SO RESTLESS THAT IT IS HARD TO SIT STILL: NEARLY EVERY DAY
GAD7 TOTAL SCORE: 18
6. BECOMING EASILY ANNOYED OR IRRITABLE: MORE THAN HALF THE DAYS
2. NOT BEING ABLE TO STOP OR CONTROL WORRYING: NEARLY EVERY DAY

## 2018-05-30 ASSESSMENT — PAIN SCALES - GENERAL: PAINLEVEL: SEVERE PAIN (7)

## 2018-05-30 ASSESSMENT — PATIENT HEALTH QUESTIONNAIRE - PHQ9: 5. POOR APPETITE OR OVEREATING: NEARLY EVERY DAY

## 2018-05-30 NOTE — LETTER
June 1, 2018      Ayesha Casas  [address]  [place] [address]        Ms. Casas,    All of your testing was normal. Please let us know if you have any questions.  Thank you for allowing us to participate in your care.    Sincerely,      Barbara Herrera, ROZ CNP/ymv    Resulted Orders   Rapid strep screen   Result Value Ref Range    Specimen Description Throat     Rapid Strep A Screen       NEGATIVE: No Group A streptococcal antigen detected by immunoassay, await culture report.   Chlamydia trachomatis PCR   Result Value Ref Range    Specimen Description Urine     Chlamydia Trachomatis PCR Negative NEG^Negative      Comment:      Negative for C. trachomatis rRNA by transcription mediated amplification.  A negative result by transcription mediated amplification does not preclude   the presence of C. trachomatis infection because results are dependent on   proper and adequate collection, absence of inhibitors, and sufficient rRNA to   be detected.     Hepatitis C Screen Reflex to HCV RNA Quant and Genotype   Result Value Ref Range    Hepatitis C Antibody Nonreactive NR^Nonreactive      Comment:      Assay performance characteristics have not been established for newborns,   infants, and children     Neisseria gonorrhoeae PCR   Result Value Ref Range    Specimen Descrip Urine     N Gonorrhea PCR Negative NEG^Negative      Comment:      Negative for N. gonorrhoeae rRNA by transcription mediated amplification.  A negative result by transcription mediated amplification does not preclude   the presence of N. gonorrhoeae infection because results are dependent on   proper and adequate collection, absence of inhibitors, and sufficient rRNA to   be detected.     HIV Antigen Antibody Combo   Result Value Ref Range    HIV Antigen Antibody Combo Nonreactive NR^Nonreactive          Comment:      HIV-1 p24 Ag & HIV-1/HIV-2 Ab Not Detected   Treponema Abs w Reflex to RPR and Titer   Result Value Ref Range     Treponema Antibodies Nonreactive NR^Nonreactive   Beta strep group A culture   Result Value Ref Range    Specimen Description Throat     Culture Micro No beta hemolytic Streptococcus Group A isolated

## 2018-05-30 NOTE — PROGRESS NOTES
SUBJECTIVE:   Ayesha Casas is a 27 year old female who presents to clinic today for the following health issues:      Acute Illness   Acute illness concerns: Sore throat  Onset: 1 day ago    Fever: YES    Chills/Sweats: YES    Headache (location?): YES    Sinus Pressure:Yes    Conjunctivitis:  no    Ear Pain: no    Rhinorrhea: YES    Congestion: YES    Sore Throat: YES     Cough: no    Wheeze: YES    Decreased Appetite: YES    Nausea: YES    Vomiting: no     Diarrhea:  no     Dysuria/Freq.: no     Fatigue/Achiness: YES    Sick/Strep Exposure: YES     Therapies Tried and outcome: Nyquil      27 year old female presents with the above concerns. Notes that she had tooth removed couple months ago. Headache. Throat and glands hurt. Body aches. No known exposures. No hx strep.    Wants refill of anxiety medication. Currently taking and feels like it helps her mood. Sometimes makes her feel shaky. No SI/HI. Feels safe in environment. Seeing therapist for anxiety, depression and PTSD. Interested in medical marijuana and is looking for provider to prescribe.    Would like to be tested for STIs. No symptoms.    Problem list and histories reviewed & adjusted, as indicated.  Additional history: as documented    Patient Active Problem List   Diagnosis     CARDIOVASCULAR SCREENING; LDL GOAL LESS THAN 160     Anxiety     Tobacco use disorder     Adjustment disorder with anxious mood     Borderline personality disorder     Intermittent asthma     Obesity, Class I, BMI 30-34.9     Past Surgical History:   Procedure Laterality Date     NO HISTORY OF SURGERY         Social History   Substance Use Topics     Smoking status: Current Every Day Smoker     Packs/day: 0.20     Types: Cigarettes     Smokeless tobacco: Never Used     Alcohol use Yes      Comment: beer 2/d     Family History   Problem Relation Age of Onset     Hypertension Maternal Grandmother      DIABETES Maternal Grandfather      Cancer - colorectal Maternal  "Grandfather      Breast Cancer Maternal Aunt          Current Outpatient Prescriptions   Medication Sig Dispense Refill     albuterol (PROAIR HFA/PROVENTIL HFA/VENTOLIN HFA) 108 (90 BASE) MCG/ACT Inhaler Inhale 2 puffs into the lungs every 6 hours 1 Inhaler 5     hydrocortisone (ANUSOL-HC) 2.5 % rectal cream Place rectally 2 times daily 30 g 1     ibuprofen (ADVIL/MOTRIN) 600 MG tablet Take 1 tablet (600 mg) by mouth every 8 hours as needed for moderate pain 30 tablet 1     ibuprofen 200 MG capsule Take 200 mg by mouth every 4 hours as needed       LORazepam (ATIVAN) 1 MG tablet Take 1 tablet (1 mg) by mouth daily as needed for anxiety 30 tablet 0     sertraline (ZOLOFT) 50 MG tablet Take 1 tablet (50 mg) by mouth daily 30 tablet 3     triamcinolone (KENALOG) 0.1 % cream Apply sparingly to affected area three times daily for 14 days. 30 g 0     [DISCONTINUED] sertraline (ZOLOFT) 50 MG tablet Take 1 tablet (50 mg) by mouth daily 30 tablet 3     Allergies   Allergen Reactions     Penicillins      BP Readings from Last 3 Encounters:   05/30/18 111/70   12/15/17 119/76   08/16/17 107/73    Wt Readings from Last 3 Encounters:   05/30/18 154 lb (69.9 kg)   12/15/17 151 lb (68.5 kg)   08/16/17 151 lb 6.4 oz (68.7 kg)                  Labs reviewed in EPIC    Reviewed and updated as needed this visit by clinical staff  Tobacco  Allergies  Meds  Problems  Med Hx  Surg Hx  Fam Hx  Soc Hx        Reviewed and updated as needed this visit by Provider  Allergies  Meds  Problems         ROS:  Constitutional, HEENT, cardiovascular, pulmonary, GI, , musculoskeletal, neuro, skin, endocrine and psych systems are negative, except as otherwise noted.    OBJECTIVE:     /70 (BP Location: Right arm, Patient Position: Chair, Cuff Size: Adult Regular)  Pulse 82  Temp 97.8  F (36.6  C) (Oral)  Ht 4' 9.5\" (1.461 m)  Wt 154 lb (69.9 kg)  LMP 05/29/2018 (Exact Date)  SpO2 99%  Breastfeeding? No  BMI 32.75 kg/m2  Body " mass index is 32.75 kg/(m^2).  GENERAL: healthy, alert and no distress  EYES: Eyes grossly normal to inspection, PERRL and conjunctivae and sclerae normal  HENT: ear canals and TM's normal, nose and mouth without ulcers or lesions. Both tonsils 2+ with exudate present. Uvula midline. No evidence of peritonsillar abscess.  NECK: no adenopathy, no asymmetry, masses, or scars and thyroid normal to palpation  RESP: lungs clear to auscultation - no rales, rhonchi or wheezes  CV: regular rate and rhythm, normal S1 S2, no S3 or S4, no murmur, click or rub, no peripheral edema and peripheral pulses strong  ABDOMEN: soft, nontender, no hepatosplenomegaly, no masses and bowel sounds normal  MS: no gross musculoskeletal defects noted, no edema  PSYCH: mentation appears normal, affect normal/bright    Diagnostic Test Results:  Results for orders placed or performed in visit on 05/30/18 (from the past 24 hour(s))   Rapid strep screen   Result Value Ref Range    Specimen Description Throat     Rapid Strep A Screen       NEGATIVE: No Group A streptococcal antigen detected by immunoassay, await culture report.       ASSESSMENT/PLAN:     1. Throat pain  Negative RST. Awaiting cx. Likely viral  - Rapid strep screen  - Beta strep group A culture  Rapid strep negative, awaiting culture. We will call you in the next 24-48 hours only if positive.  Likely viral  Push fluids, rest  Gargle with warm salt water  Tylenol or ibuprofen as needed for pain or fever  If worsening or not improving in 3 days, follow up with primary care provider, sooner if needed    2. Adjustment disorder with anxious mood  No red flags today: No SI/HI. Feels safe. Will continue current dose for now. Offered psychiatry consult, but patient declined for now. She will continue therapist. She's also interested in medical marijuana.  - sertraline (ZOLOFT) 50 MG tablet; Take 1 tablet (50 mg) by mouth daily  Dispense: 30 tablet; Refill: 3  Continue zoloft. If you change  your mind and would like to see psychiatry, please let me know and I'll put in the referral.   If you are in crisis, you can call the Crisis Connection Hotline 24/7 at [phone]  [facility] open 24/7 for anyone in crisis for assessment, evaluation and care: [phone]  If you are thinking of hurting yourself or someone else, you can also go to the emergency department or call 911    3. Routine screening for STI (sexually transmitted infection)  asymptomatic  - Chlamydia trachomatis PCR  - Hepatitis C Screen Reflex to HCV RNA Quant and Genotype  - Neisseria gonorrhoeae PCR  - HIV Antigen Antibody Combo  - Treponema Abs w Reflex to RPR and Titer    See Patient Instructions    The benefits, risks and potential side effects were discussed in detail. Black box warnings discussed as relevant. All patient questions were answered. The patient was instructed to follow up immediately if any adverse reactions develop.    Patient verbalizes understanding and agrees with plan of care. Patient stable for discharge.      ROZ Barahona [facility]

## 2018-05-30 NOTE — PATIENT INSTRUCTIONS
Rapid strep negative, awaiting culture. We will call you in the next 24-48 hours only if positive.  Likely viral  Push fluids, rest  Gargle with warm salt water  Tylenol or ibuprofen as needed for pain or fever  If worsening or not improving in 3 days, follow up with primary care provider, sooner if needed    Continue zoloft. If you change your mind and would like to see psychiatry, please let me know and I'll put in the referral.   If you are in crisis, you can call the Crisis Connection Hotline 24/7 at [phone]  [facility] open 24/7 for anyone in crisis for assessment, evaluation and care: [phone]  If you are thinking of hurting yourself or someone else, you can also go to the emergency department or call 911    STI testing pending - we'll notify you with results      At [facility], we strive to deliver an exceptional experience to you, every time we see you.  If you receive a survey in the mail, please send us back your thoughts. We really do value your feedback.    Based on your medical history, these are the current health maintenance/preventive care services that you are due for (some may have been done at this visit.)  Health Maintenance Due   Topic Date Due     ASTHMA CONTROL TEST Q6 MOS  02/16/2018     ASTHMA ACTION PLAN Q1 YR  04/25/2018         Suggested websites for health information:  Www.[employer].org : Up to date and easily searchable information on multiple topics.  Www.medlineplus.gov : medication info, interactive tutorials, watch real surgeries online  Www.familydoctor.org : good info from the Academy of Family Physicians  Www.cdc.gov : public health info, travel advisories, epidemics (H1N1)  Www.aap.org : children's health info, normal development, vaccinations  Www.health.[facility].[place].us : [place] dept of health, public health issues in [place], N1N1    Your care team:                            Family Medicine Internal Medicine   Nahum  MD Marcus Harden MD Shantel Branch-Fleming, MD Katya Georgiev PA-C Nam Ho, MD Pediatrics   PRITESH López, MD Anita Sarmiento CNP, MD Deborah Mielke, MD Kim Thein, APRN [facility]      Clinic hours: Monday - Thursday 7 am-7 pm; Fridays 7 am-5 pm.   Urgent care: Monday - Friday 11 am-9 pm; Saturday and Sunday 9 am-5 pm.  Pharmacy : Monday -Thursday 8 am-8 pm; Friday 8 am-6 pm; Saturday and Sunday 9 am-5 pm.     Clinic: [phone]   Pharmacy: [phone]

## 2018-05-30 NOTE — MR AVS SNAPSHOT
After Visit Summary   5/30/2018    Ayesha Casas    MRN: [id number]           Patient Information     Date Of Birth          1990        Visit Information        Provider Department      5/30/2018 3:40 PM Barbara Herrera APRN CNP [facility]        Today's Diagnoses     Throat pain    -  1    Adjustment disorder with anxious mood        Routine screening for STI (sexually transmitted infection)          Care Instructions    Rapid strep negative, awaiting culture. We will call you in the next 24-48 hours only if positive.  Likely viral  Push fluids, rest  Gargle with warm salt water  Tylenol or ibuprofen as needed for pain or fever  If worsening or not improving in 3 days, follow up with primary care provider, sooner if needed    Continue zoloft. If you change your mind and would like to see psychiatry, please let me know and I'll put in the referral.   If you are in crisis, you can call the Crisis Connection Hotline 24/7 at [phone]  [facility] [place] open 24/7 for anyone in crisis for assessment, evaluation and care: [phone]  If you are thinking of hurting yourself or someone else, you can also go to the emergency department or call 911    STI testing pending - we'll notify you with results      At [facility], we strive to deliver an exceptional experience to you, every time we see you.  If you receive a survey in the mail, please send us back your thoughts. We really do value your feedback.    Based on your medical history, these are the current health maintenance/preventive care services that you are due for (some may have been done at this visit.)  Health Maintenance Due   Topic Date Due     ASTHMA CONTROL TEST Q6 MOS  02/16/2018     ASTHMA ACTION PLAN Q1 YR  04/25/2018         Suggested websites for health information:  Www.[employer].org : Up to date and easily searchable information on multiple  topics.  Www.medlineplus.gov : medication info, interactive tutorials, watch real surgeries online  Www.familydoctor.org : good info from the Academy of Family Physicians  Www.cdc.gov : public health info, travel advisories, epidemics (H1N1)  Www.aap.org : children's health info, normal development, vaccinations  Www.health.[facility].[place].us : [place] dept of health, public health issues in [place], N1N1    Your care team:                            Family Medicine Internal Medicine   MD Marcus Padron MD Shantel Branch-Fleming, MD Katya Georgiev PA-C Nam Ho, MD Pediatrics   PRITESH López, CNP Kelly Verma APRN CNP   MD Anita Gomez MD Deborah Mielke, MD Kim Thein, APRN CNP      Clinic hours: Monday - Thursday 7 am-7 pm; Fridays 7 am-5 pm.   Urgent care: Monday - Friday 11 am-9 pm; Saturday and Sunday 9 am-5 pm.  Pharmacy : Monday -Thursday 8 am-8 pm; Friday 8 am-6 pm; Saturday and Sunday 9 am-5 pm.     Clinic: [phone]   Pharmacy: [phone]            Follow-ups after your visit        Who to contact     If you have questions or need follow up information about today's clinic visit or your schedule please contact [facility] directly at [phone].  Normal or non-critical lab and imaging results will be communicated to you by Rupeetalkhart, letter or phone within 4 business days after the clinic has received the results. If you do not hear from us within 7 days, please contact the clinic through Rupeetalkhart or phone. If you have a critical or abnormal lab result, we will notify you by phone as soon as possible.  Submit refill requests through ERPLY or call your pharmacy and they will forward the refill request to us. Please allow 3 business days for your refill to be completed.          Additional Information About Your Visit        Rupeetalkhart Information     ERPLY lets you send messages to your doctor, view your test results, renew your  "prescriptions, schedule appointments and more. To sign up, go to [email or web address] . Click on \"Log in\" on the left side of the screen, which will take you to the Welcome page. Then click on \"Sign up Now\" on the right side of the page.     You will be asked to enter the access code listed below, as well as some personal information. Please follow the directions to create your username and password.     Your access code is: [email or web address]  Expires: 2018  4:37 PM     Your access code will  in 90 days. If you need help or a new code, please call your [facility] clinic or [phone].        Care EveryWhere ID     This is your Care EveryWhere ID. This could be used by other organizations to access your [facility] medical records  [id number]        Your Vitals Were     Pulse Temperature Height Last Period Pulse Oximetry Breastfeeding?    82 97.8  F (36.6  C) (Oral) 4' 9.5\" (1.461 m) 2018 (Exact Date) 99% No    BMI (Body Mass Index)                   32.75 kg/m2            Blood Pressure from Last 3 Encounters:   18 111/70   12/15/17 119/76   17 107/73    Weight from Last 3 Encounters:   18 154 lb (69.9 kg)   12/15/17 151 lb (68.5 kg)   17 151 lb 6.4 oz (68.7 kg)              We Performed the Following     Beta strep group A culture     Chlamydia trachomatis PCR     Hepatitis C Screen Reflex to HCV RNA Quant and Genotype     HIV Antigen Antibody Combo     Neisseria gonorrhoeae PCR     Rapid strep screen     Treponema Abs w Reflex to RPR and Titer          Where to get your medicines      [place] [facility] [place] [address]  [address], [facility] [address]     Phone:  [phone]     sertraline 50 MG tablet          Primary Care Provider    Mulu Mcmillan PA-C       No address on file        Equal Access to Services     ABRAHAM DUDLEY AH: Hadii smith Guzman, drewda kj, qaybta miroslavamaaiden " rj cohenfamilia suazoaan ah. So [facility] [phone].    ATENCIÓN: Si yasmin neff, tiene a norman disposición servicios gratuitos de asistencia lingüística. Jake al [phone].    We comply with applicable federal civil rights laws and [place] laws. We do not discriminate on the basis of race, color, national origin, age, disability, sex, sexual orientation, or gender identity.            Thank you!     Thank you for choosing [facility]  for your care. Our goal is always to provide you with excellent care. Hearing back from our patients is one way we can continue to improve our services. Please take a few minutes to complete the written survey that you may receive in the mail after your visit with us. Thank you!             Your Updated Medication List - Protect others around you: Learn how to safely use, store and throw away your medicines at www.disposemymeds.org.          This list is accurate as of 5/30/18  4:37 PM.  Always use your most recent med list.                   Brand Name Dispense Instructions for use Diagnosis    albuterol 108 (90 Base) MCG/ACT Inhaler    PROAIR HFA/PROVENTIL HFA/VENTOLIN HFA    1 Inhaler    Inhale 2 puffs into the lungs every 6 hours    Intermittent asthma, with acute exacerbation       hydrocortisone 2.5 % cream    ANUSOL-HC    30 g    Place rectally 2 times daily    External hemorrhoids       * ibuprofen 200 MG capsule      Take 200 mg by mouth every 4 hours as needed        * ibuprofen 600 MG tablet    ADVIL/MOTRIN    30 tablet    Take 1 tablet (600 mg) by mouth every 8 hours as needed for moderate pain    Obesity, Class I, BMI 30-34.9       LORazepam 1 MG tablet    ATIVAN    30 tablet    Take 1 tablet (1 mg) by mouth daily as needed for anxiety    Adjustment disorder with anxious mood       sertraline 50 MG tablet    ZOLOFT    30 tablet    Take 1 tablet (50 mg) by mouth daily    Adjustment disorder with anxious mood        triamcinolone 0.1 % cream    KENALOG    30 g    Apply sparingly to affected area three times daily for 14 days.    Dermatitis       * Notice:  This list has 2 medication(s) that are the same as other medications prescribed for you. Read the directions carefully, and ask your doctor or other care provider to review them with you.

## 2018-05-31 LAB
BACTERIA SPEC CULT: NORMAL
C TRACH DNA SPEC QL NAA+PROBE: NEGATIVE
HCV AB SERPL QL IA: NONREACTIVE
HIV 1+2 AB+HIV1 P24 AG SERPL QL IA: NONREACTIVE
N GONORRHOEA DNA SPEC QL NAA+PROBE: NEGATIVE
SPECIMEN SOURCE: NORMAL
T PALLIDUM AB SER QL: NONREACTIVE

## 2018-05-31 ASSESSMENT — PATIENT HEALTH QUESTIONNAIRE - PHQ9: SUM OF ALL RESPONSES TO PHQ QUESTIONS 1-9: 21

## 2018-05-31 ASSESSMENT — ANXIETY QUESTIONNAIRES: GAD7 TOTAL SCORE: 18

## 2018-05-31 NOTE — TELEPHONE ENCOUNTER
Patient requested name of clinic that does medical marijuana. I couldn't remember name until now. It's [facility]. I don't know if they manage it for anxiety though. Please let her know.

## 2018-05-31 NOTE — TELEPHONE ENCOUNTER
This writer attempted to contact Ayesha on 05/31/18      Reason for call facility name and left message.      If patient calls back:   Relay message Your recent provider recalls the clinic that does order medical marijuana. It is called [facility]. Unsure if they manage anxiety but you can contact them to get more information(patient can google)., (read verbatim), document that pt called and close encounter        Suleiman Landeros

## 2018-06-01 NOTE — TELEPHONE ENCOUNTER
This writer attempted to contact Ayesha on 06/01/18      Reason for call informed clinic name and left detailed message.      If patient calls back:   Relay message, (read verbatim), document that pt called and close encounter        Carlos Ruffin MA

## 2018-06-01 NOTE — PROGRESS NOTES
Please send letter:    Ms. Casas,  All of your testing was normal. Please let us know if you have any questions.  Thank you for allowing us to participate in your care.  ROZ Barahona CNP

## 2018-09-28 ENCOUNTER — OFFICE VISIT (OUTPATIENT)
Dept: FAMILY MEDICINE | Facility: CLINIC | Age: 28
End: 2018-09-28
Payer: MEDICARE

## 2018-09-28 VITALS
BODY MASS INDEX: 32.12 KG/M2 | RESPIRATION RATE: 12 BRPM | WEIGHT: 153 LBS | HEART RATE: 86 BPM | OXYGEN SATURATION: 99 % | DIASTOLIC BLOOD PRESSURE: 80 MMHG | SYSTOLIC BLOOD PRESSURE: 118 MMHG | TEMPERATURE: 98.7 F | HEIGHT: 58 IN

## 2018-09-28 DIAGNOSIS — G47.00 INSOMNIA, UNSPECIFIED TYPE: ICD-10-CM

## 2018-09-28 DIAGNOSIS — Z13.6 CARDIOVASCULAR SCREENING; LDL GOAL LESS THAN 160: ICD-10-CM

## 2018-09-28 DIAGNOSIS — Z00.01 ENCOUNTER FOR ROUTINE ADULT HEALTH EXAMINATION WITH ABNORMAL FINDINGS: Primary | ICD-10-CM

## 2018-09-28 DIAGNOSIS — E66.811 OBESITY, CLASS I, BMI 30-34.9: ICD-10-CM

## 2018-09-28 DIAGNOSIS — Z13.1 SCREENING FOR DIABETES MELLITUS: ICD-10-CM

## 2018-09-28 DIAGNOSIS — E55.9 VITAMIN D DEFICIENCY: ICD-10-CM

## 2018-09-28 DIAGNOSIS — R41.840 INATTENTION: ICD-10-CM

## 2018-09-28 DIAGNOSIS — J45.20 MILD INTERMITTENT ASTHMA WITHOUT COMPLICATION: ICD-10-CM

## 2018-09-28 DIAGNOSIS — R53.83 FATIGUE, UNSPECIFIED TYPE: ICD-10-CM

## 2018-09-28 DIAGNOSIS — L70.0 ACNE VULGARIS: ICD-10-CM

## 2018-09-28 LAB
CHOLEST SERPL-MCNC: 189 MG/DL
DEPRECATED CALCIDIOL+CALCIFEROL SERPL-MC: 16 UG/L (ref 20–75)
ERYTHROCYTE [DISTWIDTH] IN BLOOD BY AUTOMATED COUNT: 12.5 % (ref 10–15)
GLUCOSE SERPL-MCNC: 94 MG/DL (ref 70–99)
HCT VFR BLD AUTO: 40.9 % (ref 35–47)
HDLC SERPL-MCNC: 73 MG/DL
HGB BLD-MCNC: 13.7 G/DL (ref 11.7–15.7)
LDLC SERPL CALC-MCNC: 102 MG/DL
MCH RBC QN AUTO: 31.6 PG (ref 26.5–33)
MCHC RBC AUTO-ENTMCNC: 33.5 G/DL (ref 31.5–36.5)
MCV RBC AUTO: 94 FL (ref 78–100)
NONHDLC SERPL-MCNC: 116 MG/DL
PLATELET # BLD AUTO: 315 10E9/L (ref 150–450)
RBC # BLD AUTO: 4.34 10E12/L (ref 3.8–5.2)
TRIGL SERPL-MCNC: 68 MG/DL
WBC # BLD AUTO: 8.5 10E9/L (ref 4–11)

## 2018-09-28 PROCEDURE — 80061 LIPID PANEL: CPT | Performed by: NURSE PRACTITIONER

## 2018-09-28 PROCEDURE — 85027 COMPLETE CBC AUTOMATED: CPT | Performed by: NURSE PRACTITIONER

## 2018-09-28 PROCEDURE — 99395 PREV VISIT EST AGE 18-39: CPT | Performed by: NURSE PRACTITIONER

## 2018-09-28 PROCEDURE — 99214 OFFICE O/P EST MOD 30 MIN: CPT | Mod: 25 | Performed by: NURSE PRACTITIONER

## 2018-09-28 PROCEDURE — 82306 VITAMIN D 25 HYDROXY: CPT | Performed by: NURSE PRACTITIONER

## 2018-09-28 PROCEDURE — 36415 COLL VENOUS BLD VENIPUNCTURE: CPT | Performed by: NURSE PRACTITIONER

## 2018-09-28 PROCEDURE — 82947 ASSAY GLUCOSE BLOOD QUANT: CPT | Performed by: NURSE PRACTITIONER

## 2018-09-28 RX ORDER — ALBUTEROL SULFATE 90 UG/1
2 AEROSOL, METERED RESPIRATORY (INHALATION) EVERY 6 HOURS
Qty: 1 INHALER | Refills: 5 | Status: SHIPPED | OUTPATIENT
Start: 2018-09-28 | End: 2023-11-17

## 2018-09-28 RX ORDER — CLINDAMYCIN PHOSPHATE 10 MG/G
GEL TOPICAL DAILY
Qty: 60 G | Refills: 11 | Status: SHIPPED | OUTPATIENT
Start: 2018-09-28 | End: 2023-02-13

## 2018-09-28 ASSESSMENT — PAIN SCALES - GENERAL: PAINLEVEL: NO PAIN (0)

## 2018-09-28 NOTE — MR AVS SNAPSHOT
After Visit Summary   9/28/2018    Ayesha Casas    MRN: [id number]           Patient Information     Date Of Birth          1990        Visit Information        Provider Department      9/28/2018 8:40 AM Barbara Herrera APRN [facility]        Today's Diagnoses     Encounter for routine adult health examination with abnormal findings    -  1    Inattention        Mild intermittent asthma without complication        Obesity, Class I, BMI 30-34.9        Acne vulgaris        CARDIOVASCULAR SCREENING; LDL GOAL LESS THAN 160        Vitamin D deficiency        Screening for diabetes mellitus        Fatigue, unspecified type        Insomnia, unspecified type          Care Instructions    Labs pending    Acne: start clindamycin once daily and benzoyl peroxide twice daily. If it causes facial drying, use every other day    Follow up with mental health for ADHD assessment    Referral for weight loss clinic placed    Refill for albuterol sent        Preventive Health Recommendations  Female Ages 26 - 39  Yearly exam:   See your health care provider every year in order to    Review health changes.     Discuss preventive care.      Review your medicines if you your doctor has prescribed any.    Until age 30: Get a Pap test every three years (more often if you have had an abnormal result).    After age 30: Talk to your doctor about whether you should have a Pap test every 3 years or have a Pap test with HPV screening every 5 years.   You do not need a Pap test if your uterus was removed (hysterectomy) and you have not had cancer.  You should be tested each year for STDs (sexually transmitted diseases), if you're at risk.   Talk to your provider about how often to have your cholesterol checked.  If you are at risk for diabetes, you should have a diabetes test (fasting glucose).  Shots: Get a flu shot each year. Get a tetanus shot every 10 years.   Nutrition:     Eat at least 5  servings of fruits and vegetables each day.    Eat whole-grain bread, whole-wheat pasta and brown rice instead of white grains and rice.    Get adequate Calcium and Vitamin D.     Lifestyle    Exercise at least 150 minutes a week (30 minutes a day, 5 days of the week). This will help you control your weight and prevent disease.    Limit alcohol to one drink per day.    No smoking.     Wear sunscreen to prevent skin cancer.    See your dentist every six months for an exam and cleaning.      At [facility], we strive to deliver an exceptional experience to you, every time we see you.  If you receive a survey in the mail, please send us back your thoughts. We really do value your feedback.    Based on your medical history, these are the current health maintenance/preventive care services that you are due for (some may have been done at this visit.)  Health Maintenance Due   Topic Date Due     ASTHMA CONTROL TEST Q6 MOS  02/16/2018     ASTHMA ACTION PLAN Q1 YR  04/25/2018     INFLUENZA VACCINE (1) 09/01/2018         Suggested websites for health information:  Www.BYTEGRID.Wealink.com : Up to date and easily searchable information on multiple topics.  Www.medlineplus.gov : medication info, interactive tutorials, watch real surgeries online  Www.familydoctor.org : good info from the Academy of Family Physicians  Www.cdc.gov : public health info, travel advisories, epidemics (H1N1)  Www.aap.org : children's health info, normal development, vaccinations  Www.health.[facility].[place].us : [place] dept of health, public health issues in [place], N1N1    Your care team:                            Family Medicine Internal Medicine   MD Marcus Padron MD Shantel Branch-Fleming, MD Katya Georgiev PA-C Nam Ho, MD Pediatrics   PRITESH López, MD Anita Sarmiento CNP, MD Deborah Mielke, MD Kim Thein, APRN J[facility]ONATAN      Clinic hours: Monday - Thursday 7  am-7 pm; Fridays 7 am-5 pm.   Urgent care: Monday - Friday 11 am-9 pm; Saturday and Sunday 9 am-5 pm.  Pharmacy : Monday -Thursday 8 am-8 pm; Friday 8 am-6 pm; Saturday and Sunday 9 am-5 pm.     Clinic: [phone]   Pharmacy: [phone]            Follow-ups after your visit        Additional Services     BARIATRIC ADULT REFERRAL       Your provider has referred you to: G: [facility] [place] [phone]  [email or web address]    Please be aware that coverage of these services is subject to the terms and limitations of your health insurance plan.  Call member services at your health plan with any benefit or coverage questions.      Please bring the following with you to your appointment:      (1) List of current medications   (2) This referral request   (3) Any documents/labs given to you for this referral            MENTAL HEALTH REFERRAL  - Adult; Assessments and Testing; ADHD; FMG: [facility] [phone]; We will contact you to schedule the appointment or please call with any questions       All scheduling is subject to the client's specific insurance plan & benefits, provider/location availability, and provider clinical specialities.  Please arrive 15 minutes early for your first appointment and bring your completed paperwork.    Please be aware that coverage of these services is subject to the terms and limitations of your health insurance plan.  Call member services at your health plan with any benefit or coverage questions.                      SLEEP EVALUATION & MANAGEMENT REFERRAL - ADULT -[facility] [place]  [phone] (Age 15 and up)       Please be aware that coverage of these services is subject to the terms and limitations of your health insurance plan.  Call member services at your health plan with any benefit or coverage questions.   "    Please bring the following to your appointment:    >>   List of current medications   >>   This referral request   >>   Any documents/labs given to you for this referral                      Future tests that were ordered for you today     Open Future Orders        Priority Expected Expires Ordered    SLEEP EVALUATION & MANAGEMENT REFERRAL - ADULT -[facility] [place]  [phone] (Age 15 and up) Routine  2019            Who to contact     If you have questions or need follow up information about today's clinic visit or your schedule please contact [facility] directly at [phone].  Normal or non-critical lab and imaging results will be communicated to you by MyChart, letter or phone within 4 business days after the clinic has received the results. If you do not hear from us within 7 days, please contact the clinic through [email or web address] or phone. If you have a critical or abnormal lab result, we will notify you by phone as soon as possible.  Submit refill requests through St[employer]Path or call your pharmacy and they will forward the refill request to us. Please allow 3 business days for your refill to be completed.          Additional Information About Your Visit        MyChart Information     StemPath lets you send messages to your doctor, view your test results, renew your prescriptions, schedule appointments and more. To sign up, go to [email or web address] . Click on \"Log in\" on the left side of the screen, which will take you to the Welcome page. Then click on \"Sign up Now\" on the right side of the page.     You will be asked to enter the access code listed below, as well as some personal information. Please follow the directions to create your username and password.     Your access code is: [email or web address]  Expires: 2018  9:24 AM     Your access code will  in 90 days. If you need help or a new code, please call your [facility] or [phone].   " "     Care EveryWhere ID     This is your Care EveryWhere ID. This could be used by other organizations to access your [place] medical records  [id number]        Your Vitals Were     Pulse Temperature Respirations Height Last Period Pulse Oximetry    86 98.7  F (37.1  C) (Oral) 12 4' 10.25\" (1.48 m) 09/26/2018 99%    Breastfeeding? BMI (Body Mass Index)                No 31.7 kg/m2           Blood Pressure from Last 3 Encounters:   09/28/18 118/80   05/30/18 111/70   12/15/17 119/76    Weight from Last 3 Encounters:   09/28/18 153 lb (69.4 kg)   05/30/18 154 lb (69.9 kg)   12/15/17 151 lb (68.5 kg)              We Performed the Following     BARIATRIC ADULT REFERRAL     CBC with platelets     Glucose     Lipid panel reflex to direct LDL Fasting     MENTAL HEALTH REFERRAL  - Adult; Assessments and Testing; ADHD; FMG: [facility] [phone]; We will contact you to schedule the appointment or please call with any questions     Vitamin D Deficiency          Today's Medication Changes          These changes are accurate as of 9/28/18  9:30 AM.  If you have any questions, ask your nurse or doctor.               Start taking these medicines.        Dose/Directions    Benzoyl Peroxide 2.5 % Crea   Used for:  Acne vulgaris   Started by:  Barbara Herrera APRN CNP        Externally apply topically 2 times daily   Quantity:  1 Tube   Refills:  3       clindamycin 1 % topical gel   Commonly known as:  CLINDAGEL   Used for:  Acne vulgaris   Started by:  Barbara Herrera APRN CNP        Apply topically daily   Quantity:  60 g   Refills:  11            Where to get your medicines      These medications were sent to [id number] - [place] - [address] AT [address] [place], [place] [address]     Phone:  [phone]     albuterol 108 (90 Base) MCG/ACT inhaler    Benzoyl Peroxide 2.5 % Crea    clindamycin 1 % topical gel                Primary Care " Provider    Mulu Mcmillan PA-C       No address on file        Equal Access to Services     ABRAHAM DUDLEY : Hadii aad krystal dara [place], wamarcelada kj, qatommieta kadaiaiden cohen, rj gilbertrudylupillo estrada. [facility] [phone].    ATENCIÓN: Si habla español, tiene a norman disposición servicios gratuitos de asistencia lingüística. [facility] [phone].    We comply with applicable federal civil rights laws and [place] laws. We do not discriminate on the basis of race, color, national origin, age, disability, sex, sexual orientation, or gender identity.            Thank you!     Thank you for choosing [facility]  for your care. Our goal is always to provide you with excellent care. Hearing back from our patients is one way we can continue to improve our services. Please take a few minutes to complete the written survey that you may receive in the mail after your visit with us. Thank you!             Your Updated Medication List - Protect others around you: Learn how to safely use, store and throw away your medicines at www.disposemymeds.org.          This list is accurate as of 9/28/18  9:30 AM.  Always use your most recent med list.                   Brand Name Dispense Instructions for use Diagnosis    albuterol 108 (90 Base) MCG/ACT inhaler    PROAIR HFA/PROVENTIL HFA/VENTOLIN HFA    1 Inhaler    Inhale 2 puffs into the lungs every 6 hours    Mild intermittent asthma without complication       Benzoyl Peroxide 2.5 % Crea     1 Tube    Externally apply topically 2 times daily    Acne vulgaris       clindamycin 1 % topical gel    CLINDAGEL    60 g    Apply topically daily    Acne vulgaris       hydrocortisone 2.5 % cream    ANUSOL-HC    30 g    Place rectally 2 times daily    External hemorrhoids       * ibuprofen 200 MG capsule      Take 200 mg by mouth every 4 hours as needed        * ibuprofen 600 MG tablet    ADVIL/MOTRIN    30 tablet    Take 1 tablet (600 mg) by  mouth every 8 hours as needed for moderate pain    Obesity, Class I, BMI 30-34.9       LORazepam 1 MG tablet    ATIVAN    30 tablet    Take 1 tablet (1 mg) by mouth daily as needed for anxiety    Adjustment disorder with anxious mood       triamcinolone 0.1 % cream    KENALOG    30 g    Apply sparingly to affected area three times daily for 14 days.    Dermatitis       * Notice:  This list has 2 medication(s) that are the same as other medications prescribed for you. Read the directions carefully, and ask your doctor or other care provider to review them with you.

## 2018-09-28 NOTE — PROGRESS NOTES
SUBJECTIVE:   CC: Ayesha Casas is an 28 year old woman who presents for preventive health visit.     Healthy Habits:    Do you get at least three servings of calcium containing foods daily (dairy, green leafy vegetables, etc.)? No    Amount of exercise or daily activities, outside of work: 2 day(s) per week    Problems taking medications regularly No    Medication side effects: No    Have you had an eye exam in the past two years? no    Do you see a dentist twice per year? yes    Do you have sleep apnea, excessive snoring or daytime drowsiness?yes      28 year old female presents with mom with several concerns today. She was asked to come in by her psychologist, Fernanda Sanabria at [facility].    1. Inattention - thinks she was dx with ADHD as a kid. Not on treatment. Psychologist would like her to be retested and start treatment if appropriate.     2. Mental health - sees psychologist every 2 weeks. She reports previous hx of anxiety, depression, PTSD. Psychologist recommended she stop zoloft and start vit C and vit D. Psychologist would like also patient checked for anemia due to fatigue.    3. Asthma - needs refills of albuterol. She's been out for several weeks. She feels short of breath at night because of anxiety and out of inhaler. Feels like asthma ir well controlled.     4. Acne - has papules around cheeks, mouth and chin. Comes and goes. Worse with stress.    5. Patient would like referral to sleep medicine due to persistent insomnia. She reports that she sometimes takes melatonin with little relief. Partly related to anxiety.    6. Overweight - interested in weight loss program with meal plan        Today's PHQ-2 Score:   PHQ-2 ( 1999 Pfizer) 5/30/2018 12/15/2017   Q1: Little interest or pleasure in doing things 3 0   Q2: Feeling down, depressed or hopeless 2 0   PHQ-2 Score 5 0       Abuse: Current or Past(Physical, Sexual or Emotional)- No  Do you feel safe in your environment - Yes    Social History    Substance Use Topics     Smoking status: Current Every Day Smoker     Packs/day: 0.20     Types: Cigarettes     Smokeless tobacco: Never Used     Alcohol use Yes      Comment: beer 2/d     If you drink alcohol do you typically have >3 drinks per day or >7 drinks per week? No                     Reviewed orders with patient.  Reviewed health maintenance and updated orders accordingly - Yes  Labs reviewed in EPIC  BP Readings from Last 3 Encounters:   09/28/18 118/80   05/30/18 111/70   12/15/17 119/76    Wt Readings from Last 3 Encounters:   09/28/18 153 lb (69.4 kg)   05/30/18 154 lb (69.9 kg)   12/15/17 151 lb (68.5 kg)                  Patient Active Problem List   Diagnosis     CARDIOVASCULAR SCREENING; LDL GOAL LESS THAN 160     Anxiety     Tobacco use disorder     Adjustment disorder with anxious mood     Borderline personality disorder     Intermittent asthma     Obesity, Class I, BMI 30-34.9     Past Surgical History:   Procedure Laterality Date     NO HISTORY OF SURGERY         Social History   Substance Use Topics     Smoking status: Current Every Day Smoker     Packs/day: 0.20     Types: Cigarettes     Smokeless tobacco: Never Used     Alcohol use Yes      Comment: beer 2/d     Family History   Problem Relation Age of Onset     Hypertension Maternal Grandmother      Diabetes Maternal Grandfather      Cancer - colorectal Maternal Grandfather      Breast Cancer Maternal Aunt          Current Outpatient Prescriptions   Medication Sig Dispense Refill     albuterol (PROAIR HFA/PROVENTIL HFA/VENTOLIN HFA) 108 (90 Base) MCG/ACT inhaler Inhale 2 puffs into the lungs every 6 hours 1 Inhaler 5     Benzoyl Peroxide 2.5 % CREA Externally apply topically 2 times daily 1 Tube 3     clindamycin (CLINDAGEL) 1 % topical gel Apply topically daily 60 g 11     hydrocortisone (ANUSOL-HC) 2.5 % rectal cream Place rectally 2 times daily 30 g 1     ibuprofen (ADVIL/MOTRIN) 600 MG tablet Take 1 tablet (600 mg) by mouth every 8  hours as needed for moderate pain 30 tablet 1     ibuprofen 200 MG capsule Take 200 mg by mouth every 4 hours as needed       LORazepam (ATIVAN) 1 MG tablet Take 1 tablet (1 mg) by mouth daily as needed for anxiety 30 tablet 0     triamcinolone (KENALOG) 0.1 % cream Apply sparingly to affected area three times daily for 14 days. 30 g 0     [DISCONTINUED] albuterol (PROAIR HFA/PROVENTIL HFA/VENTOLIN HFA) 108 (90 BASE) MCG/ACT Inhaler Inhale 2 puffs into the lungs every 6 hours 1 Inhaler 5     Allergies   Allergen Reactions     Penicillins        Mammogram not appropriate for this patient based on age.    Pertinent mammograms are reviewed under the imaging tab.  History of abnormal Pap smear: NO - age 21-29 PAP every 3 years recommended  PAP / HPV 2017 2013 10/5/2009   PAP NIL NIL NIL     Reviewed and updated as needed this visit by clinical staff  Tobacco  Allergies  Meds  Problems  Med Hx  Surg Hx  Fam Hx  Soc Hx          Reviewed and updated as needed this visit by Provider  Allergies  Meds  Problems        Past Medical History:   Diagnosis Date     ADHD (attention deficit hyperactivity disorder)     resolved     Anxiety      Fibrocystic breast changes      PTSD (post-traumatic stress disorder)      Severe persistent asthma 2010     Tobacco use disorder       Past Surgical History:   Procedure Laterality Date     NO HISTORY OF SURGERY       Obstetric History       T0      L0     SAB0   TAB0   Ectopic0   Multiple0   Live Births0           ROS:  CONSTITUTIONAL: NEGATIVE for fever, chills, change in weight  INTEGUMENTARU/SKIN: NEGATIVE for worrisome rashes, moles or lesions  EYES: NEGATIVE for vision changes or irritation  ENT: NEGATIVE for ear, mouth and throat problems  RESP: NEGATIVE for significant cough or SOB  BREAST: NEGATIVE for masses, tenderness or discharge  CV: NEGATIVE for chest pain, palpitations or peripheral edema  GI: NEGATIVE for nausea, abdominal  "pain, heartburn, or change in bowel habits  : NEGATIVE for unusual urinary or vaginal symptoms. Periods are regular.  MUSCULOSKELETAL: NEGATIVE for significant arthralgias or myalgia  NEURO: NEGATIVE for weakness, dizziness or paresthesias  ENDOCRINE: NEGATIVE for temperature intolerance, skin/hair changes  HEME/ALLERGY/IMMUNE: NEGATIVE for bleeding problems  PSYCHIATRIC: NEGATIVE for changes in mood or affect    OBJECTIVE:   /80 (BP Location: Right arm, Patient Position: Chair, Cuff Size: Adult Regular)  Pulse 86  Temp 98.7  F (37.1  C) (Oral)  Resp 12  Ht 4' 10.25\" (1.48 m)  Wt 153 lb (69.4 kg)  LMP 09/26/2018  SpO2 99%  Breastfeeding? No  BMI 31.7 kg/m2  EXAM:  GENERAL: healthy, alert and no distress  EYES: Eyes grossly normal to inspection, PERRL and conjunctivae and sclerae normal  HENT: ear canals and TM's normal, nose and mouth without ulcers or lesions  NECK: no adenopathy, no asymmetry, masses, or scars and thyroid normal to palpation  RESP: lungs clear to auscultation - no rales, rhonchi or wheezes  CV: regular rate and rhythm, normal S1 S2, no S3 or S4, no murmur, click or rub, no peripheral edema and peripheral pulses strong  ABDOMEN: soft, nontender, no hepatosplenomegaly, no masses and bowel sounds normal  MS: no gross musculoskeletal defects noted, no edema  SKIN: no suspicious lesions or rashes  NEURO: Normal strength and tone, mentation intact and speech normal  PSYCH: mentation appears normal, affect normal/bright    Diagnostic Test Results:  Results for orders placed or performed in visit on 09/28/18 (from the past 24 hour(s))   CBC with platelets   Result Value Ref Range    WBC 8.5 4.0 - 11.0 10e9/L    RBC Count 4.34 3.8 - 5.2 10e12/L    Hemoglobin 13.7 11.7 - 15.7 g/dL    Hematocrit 40.9 35.0 - 47.0 %    MCV 94 78 - 100 fl    MCH 31.6 26.5 - 33.0 pg    MCHC 33.5 31.5 - 36.5 g/dL    RDW 12.5 10.0 - 15.0 %    Platelet Count 315 150 - 450 10e9/L       ASSESSMENT/PLAN:   1. " Encounter for routine adult health examination with abnormal findings  As below    2. Inattention  Will refer for testing for ADHD. Possibly has history of ADHD but no record of this in her chart that I can find.  - MENTAL HEALTH REFERRAL  - Adult; Assessments and Testing; ADHD; FMG: [facility] [phone]; We will contact you to schedule the appointment or please call with any questions    3. Mild intermittent asthma without complication  Refilled albuterol. Due for index in 5 weeks.  - albuterol (PROAIR HFA/PROVENTIL HFA/VENTOLIN HFA) 108 (90 Base) MCG/ACT inhaler; Inhale 2 puffs into the lungs every 6 hours  Dispense: 1 Inhaler; Refill: 5    4. Obesity, Class I, BMI 30-34.9  Will refer for weight loss program per patient request  - BARIATRIC ADULT REFERRAL  - Glucose    5. Acne vulgaris  Will trial the below. Ok to use every other day if facial drying occurs. May get worse before it gets better  - clindamycin (CLINDAGEL) 1 % topical gel; Apply topically daily  Dispense: 60 g; Refill: 11  - Benzoyl Peroxide 2.5 % CREA; Externally apply topically 2 times daily  Dispense: 1 Tube; Refill: 3    6. CARDIOVASCULAR SCREENING; LDL GOAL LESS THAN 160  fasting  - Lipid panel reflex to direct LDL Fasting    7. Vitamin D deficiency  Will check today per request of psychologist  - Vitamin D Deficiency    8. Screening for diabetes mellitus  fasting  - Glucose    9. Fatigue, unspecified type  With #10  - CBC with platelets    10. Insomnia, unspecified type  Referral to sleep per patient request  - SLEEP EVALUATION & MANAGEMENT REFERRAL - ADULT -[facility] - [place]  [phone] (Age 15 and up); Future    Declined flu vaccine      COUNSELING:   Reviewed preventive health counseling, as reflected in patient instructions    BP Readings from Last 1 Encounters:   09/28/18 118/80     Estimated body mass index is 31.7 kg/(m^2) as calculated from the following:    Height as of this encounter: 4'  "10.25\" (1.48 m).    Weight as of this encounter: 153 lb (69.4 kg).      Weight management plan: Discussed healthy diet and exercise guidelines and patient will follow up in 12 months in clinic to re-evaluate.     reports that she has been smoking Cigarettes.  She has been smoking about 0.20 packs per day. She has never used smokeless tobacco.  Tobacco Cessation Action Plan: Information offered: Patient not interested at this time    Counseling Resources:  ATP IV Guidelines  Pooled Cohorts Equation Calculator  Breast Cancer Risk Calculator  FRAX Risk Assessment  ICSI Preventive Guidelines  Dietary Guidelines for Americans, 2010  USDA's MyPlate  ASA Prophylaxis  Lung CA Screening    The benefits, risks and potential side effects were discussed in detail. Black box warnings discussed as relevant. All patient questions were answered. The patient was instructed to follow up immediately if any adverse reactions develop.    Patient verbalizes understanding and agrees with plan of care. Patient stable for discharge.      ROZ [facility]  "

## 2018-09-28 NOTE — PATIENT INSTRUCTIONS
Labs pending    Acne: start clindamycin once daily and benzoyl peroxide twice daily. If it causes facial drying, use every other day    Follow up with mental health for ADHD assessment    Referral for weight loss clinic placed    Refill for albuterol sent        Preventive Health Recommendations  Female Ages 26 - 39  Yearly exam:   See your health care provider every year in order to    Review health changes.     Discuss preventive care.      Review your medicines if you your doctor has prescribed any.    Until age 30: Get a Pap test every three years (more often if you have had an abnormal result).    After age 30: Talk to your doctor about whether you should have a Pap test every 3 years or have a Pap test with HPV screening every 5 years.   You do not need a Pap test if your uterus was removed (hysterectomy) and you have not had cancer.  You should be tested each year for STDs (sexually transmitted diseases), if you're at risk.   Talk to your provider about how often to have your cholesterol checked.  If you are at risk for diabetes, you should have a diabetes test (fasting glucose).  Shots: Get a flu shot each year. Get a tetanus shot every 10 years.   Nutrition:     Eat at least 5 servings of fruits and vegetables each day.    Eat whole-grain bread, whole-wheat pasta and brown rice instead of white grains and rice.    Get adequate Calcium and Vitamin D.     Lifestyle    Exercise at least 150 minutes a week (30 minutes a day, 5 days of the week). This will help you control your weight and prevent disease.    Limit alcohol to one drink per day.    No smoking.     Wear sunscreen to prevent skin cancer.    See your dentist every six months for an exam and cleaning.      At [facility], we strive to deliver an exceptional experience to you, every time we see you.  If you receive a survey in the mail, please send us back your thoughts. We really do value your feedback.    Based on your medical  history, these are the current health maintenance/preventive care services that you are due for (some may have been done at this visit.)  Health Maintenance Due   Topic Date Due     ASTHMA CONTROL TEST Q6 MOS  02/16/2018     ASTHMA ACTION PLAN Q1 YR  04/25/2018     INFLUENZA VACCINE (1) 09/01/2018         Suggested websites for health information:  Www.[employer].org : Up to date and easily searchable information on multiple topics.  Www.medlineplus.gov : medication info, interactive tutorials, watch real surgeries online  Www.familydoctor.org : good info from the Academy of Family Physicians  Www.cdc.gov : public health info, travel advisories, epidemics (H1N1)  Www.aap.org : children's health info, normal development, vaccinations  Www.health.[facility].[place].us : [place] dept of health, public health issues in [place], N1N1    Your care team:                            Family Medicine Internal Medicine   MD Marcus Padron MD Shantel Branch-Fleming, MD Katya Georgiev PA-C Nam Ho, MD Pediatrics   PRITESH López, JHONATAN Verma APRN CNP   MD Anita Gomez MD Deborah Mielke, MD Kim Thein, APRN CNP      Clinic hours: Monday - Thursday 7 am-7 pm; Fridays 7 am-5 pm.   Urgent care: Monday - Friday 11 am-9 pm; Saturday and Sunday 9 am-5 pm.  Pharmacy : Monday -Thursday 8 am-8 pm; Friday 8 am-6 pm; Saturday and Sunday 9 am-5 pm.     Clinic: [phone]   Pharmacy: [phone]

## 2018-09-28 NOTE — LETTER
My Asthma Action Plan  Name: Ayesha Casas   YOB: 1990  Date: 9/28/2018   My doctor: ROZ Barahona CNP   My clinic: [facility]        My Control Medicine: None  My Rescue Medicine: Albuterol (Proair/Ventolin/Proventil) inhaler     My Asthma Severity: intermittent  Avoid your asthma triggers:    None              GREEN ZONE   Good Control    I feel good    No cough or wheeze    Can work, sleep and play without asthma symptoms       Take your asthma control medicine every day.     1. If exercise triggers your asthma, take your rescue medication    15 minutes before exercise or sports, and    During exercise if you have asthma symptoms  2. Spacer to use with inhaler: If you have a spacer, make sure to use it with your inhaler             YELLOW ZONE Getting Worse  I have ANY of these:    I do not feel good    Cough or wheeze    Chest feels tight    Wake up at night   1. Keep taking your Green Zone medications  2. Start taking your rescue medicine:    every 20 minutes for up to 1 hour. Then every 4 hours for 24-48 hours.  3. If you stay in the Yellow Zone for more than 12-24 hours, contact your doctor.  4. If you do not return to the Green Zone in 12-24 hours or you get worse, start taking your oral steroid medicine if prescribed by your provider.           RED ZONE Medical Alert - Get Help  I have ANY of these:    I feel awful    Medicine is not helping    Breathing getting harder    Trouble walking or talking    Nose opens wide to breathe       1. Take your rescue medicine NOW  2. If your provider has prescribed an oral steroid medicine, start taking it NOW  3. Call your doctor NOW  4. If you are still in the Red Zone after 20 minutes and you have not reached your doctor:    Take your rescue medicine again and    Call 911 or go to the emergency room right away    See your regular doctor within 2 weeks of an Emergency Room or Urgent Care visit for follow-up treatment.           Annual Reminders:  Meet with Asthma Educator,  Flu Shot in the Fall, consider Pneumonia Vaccination for patients with asthma (aged 19 and older).    Pharmacy:    [facility] [place] [address]  [id number] - [place], [place] - [address] AT [facility]  [facility] - [place], [place] - [address] AT [facility]                      Asthma Triggers  How To Control Things That Make Your Asthma Worse    Triggers are things that make your asthma worse.  Look at the list below to help you find your triggers and what you can do about them.  You can help prevent asthma flare-ups by staying away from your triggers.      Trigger                                                          What you can do   Cigarette Smoke  Tobacco smoke can make asthma worse. Do not allow smoking in your home, car or around you.  Be sure no one smokes at a child s day care or school.  If you smoke, ask your health care provider for ways to help you quit.  Ask family members to quit too.  Ask your health care provider for a referral to Quit Plan to help you quit smoking, or call 3-577-677-PLAN.     Colds, Flu, Bronchitis  These are common triggers of asthma. Wash your hands often.  Don t touch your eyes, nose or mouth.  Get a flu shot every year.     Dust Mites  These are tiny bugs that live in cloth or carpet. They are too small to see. Wash sheets and blankets in hot water every week.   Encase pillows and mattress in dust mite proof covers.  Avoid having carpet if you can. If you have carpet, vacuum weekly.   Use a dust mask and HEPA vacuum.   Pollen and Outdoor Mold  Some people are allergic to trees, grass, or weed pollen, or molds. Try to keep your windows closed.  Limit time out doors when pollen count is high.   Ask you health care provider about taking medicine during allergy season.     Animal Dander  Some people are  allergic to skin flakes, urine or saliva from pets with fur or feathers. Keep pets with fur or feathers out of your home.    If you can t keep the pet outdoors, then keep the pet out of your bedroom.  Keep the bedroom door closed.  Keep pets off cloth furniture and away from stuffed toys.     Mice, Rats, and Cockroaches  Some people are allergic to the waste from these pests.   Cover food and garbage.  Clean up spills and food crumbs.  Store grease in the refrigerator.   Keep food out of the bedroom.   Indoor Mold  This can be a trigger if your home has high moisture. Fix leaking faucets, pipes, or other sources of water.   Clean moldy surfaces.  Dehumidify basement if it is damp and smelly.   Smoke, Strong Odors, and Sprays  These can reduce air quality. Stay away from strong odors and sprays, such as perfume, powder, hair spray, paints, smoke incense, paint, cleaning products, candles and new carpet.   Exercise or Sports  Some people with asthma have this trigger. Be active!  Ask your doctor about taking medicine before sports or exercise to prevent symptoms.    Warm up for 5-10 minutes before and after sports or exercise.     Other Triggers of Asthma  Cold air:  Cover your nose and mouth with a scarf.  Sometimes laughing or crying can be a trigger.  Some medicines and food can trigger asthma.

## 2018-10-01 ENCOUNTER — TELEPHONE (OUTPATIENT)
Dept: FAMILY MEDICINE | Facility: CLINIC | Age: 28
End: 2018-10-01

## 2018-10-01 DIAGNOSIS — E55.9 VITAMIN D DEFICIENCY: Primary | ICD-10-CM

## 2018-10-01 RX ORDER — ERGOCALCIFEROL 1.25 MG/1
50000 CAPSULE, LIQUID FILLED ORAL
Qty: 8 CAPSULE | Refills: 0 | Status: SHIPPED | OUTPATIENT
Start: 2018-10-01 | End: 2018-11-20

## 2018-10-01 NOTE — PROGRESS NOTES
Please call patient and let them know their lab result was abnormal.  Vitamin D is 16. Normal is 20-75. I recommend vitamin D supplementation. I have sent vit D 50,000 units to the pharmacy. Take 1 tab once per week on the same day every week for 8 weeks. Recheck in 8 weeks. After that we will likely have her take a smaller dose depending on her lab recheck. Please let me know if questions. Thanks!

## 2018-10-01 NOTE — LETTER
October 1, 2018      Ayesha Casas  [address] [facility] [address]    Dear Ayesha Casas,    At [facility] we care about your health and are committed to providing quality patient care. It has come to our attention that you are due for an Asthma Control Test.     This screening tool helps us to assess how well your asthma is controlled. Good asthma control leads to less asthma symptoms and greater health. If your asthma is not in good control (score less than 20) it is recommended you be seen by your provider for medication and lifestyle adjustments    We have enclosed an  Asthma Control Test. Please complete the enclosed asthma control test even if you are feeling well. You can mail it back to our clinic or drop if off at our .     Thank you for your time and we hope this letter finds you well!    Sincerely,    Your Team at [facility]

## 2018-10-01 NOTE — TELEPHONE ENCOUNTER
Notes Recorded by Barbara Herrera APRN CNP on 10/1/2018 at 9:19 AM  Please call patient and let them know their lab result was abnormal.  Vitamin D is 16. Normal is 20-75. I recommend vitamin D supplementation. I have sent vit D 50,000 units to the pharmacy. Take 1 tab once per week on the same day every week for 8 weeks. Recheck in 8 weeks. After that we will likely have her take a smaller dose depending on her lab recheck. Please let me know if questions. Thanks!    This writer attempted to contact Ayesha on 10/01/18      Reason for call results and left message.      If patient calls back:   Patient contacted by a Registered Nurse. Inform patient that someone from the RN group will contact them, document that pt called and route to P DYAD 3 RN POOL [[phone]]        Susannah Villalobos RN

## 2018-10-01 NOTE — LETTER
June 18, 2019      Ayesha Casas  [address] [facility] [address]        Dear Ayesha Casas,      At [facility] we care about your health and are committed to providing quality patient care. It has come to our attention that you are due for an Asthma Control Test.     This screening tool helps us to assess how well your asthma is controlled. Good asthma control leads to less asthma symptoms and greater health. If your asthma is not in good control (score less than 20) it is recommended you be seen by your provider for medication and lifestyle adjustments    We have enclosed an  Asthma Control Test. Please complete the enclosed asthma control test even if you are feeling well. You can mail it back to our clinic or drop if off at our .     Thank you for your time and we hope this letter finds you well!      Sincerely,    Your Team at [facility]/

## 2018-10-02 NOTE — TELEPHONE ENCOUNTER
Called and spoke with patient. Advised of results below per Barbara Herrera. Patient verbalized understanding and agreeable with treatment plan. RN explained how to take new medication and to have lab only appointment in 8 weeks to recheck Vitamin D level. Discussed with patient at that point, depending on result, may have to continue on smaller dose of vitamin D. Patient understanding. No questions or concerns at this time.    María Estrada RN  [facility] Triage

## 2018-11-27 ENCOUNTER — TELEPHONE (OUTPATIENT)
Dept: FAMILY MEDICINE | Facility: CLINIC | Age: 28
End: 2018-11-27

## 2018-11-27 NOTE — TELEPHONE ENCOUNTER
Per our policy we can't do outside EKGs on ancillary. Please have her schedule with me and we can do EKG. Also have her sign THOMAS and we can release it to her psychiatrist after it's done.

## 2018-11-27 NOTE — TELEPHONE ENCOUNTER
..Reason for Call:    appointment    Detailed comments: ekg scheduling needed; Dates available 12-05, 12-10 or 12-11; Patient will bring the orders;     Phone Number Patient can be reached at: Home number on file [phone] (home)    Best Time: anytime    Can we leave a detailed message on this number? YES    Call taken on 11/27/2018 at 8:16 AM by Esthela Connolly

## 2018-11-27 NOTE — TELEPHONE ENCOUNTER
Patient will like to schedule an EKG per Dr. Vita Villanueva. psy [phone]. However, she will need an order for her PCP to have the EKG done at our clinic.  Please advise:-  Janet Harrison

## 2018-12-04 ENCOUNTER — OFFICE VISIT (OUTPATIENT)
Dept: FAMILY MEDICINE | Facility: CLINIC | Age: 28
End: 2018-12-04
Payer: MEDICARE

## 2018-12-04 VITALS
OXYGEN SATURATION: 99 % | TEMPERATURE: 98.3 F | RESPIRATION RATE: 16 BRPM | WEIGHT: 156 LBS | HEIGHT: 58 IN | BODY MASS INDEX: 32.75 KG/M2 | DIASTOLIC BLOOD PRESSURE: 78 MMHG | HEART RATE: 90 BPM | SYSTOLIC BLOOD PRESSURE: 137 MMHG

## 2018-12-04 DIAGNOSIS — Z79.899 MEDICATION MANAGEMENT: ICD-10-CM

## 2018-12-04 DIAGNOSIS — F90.2 ADHD (ATTENTION DEFICIT HYPERACTIVITY DISORDER), COMBINED TYPE: Primary | ICD-10-CM

## 2018-12-04 DIAGNOSIS — F41.9 ANXIETY: Primary | ICD-10-CM

## 2018-12-04 DIAGNOSIS — E55.9 VITAMIN D DEFICIENCY: ICD-10-CM

## 2018-12-04 LAB — TSH SERPL DL<=0.005 MIU/L-ACNC: 1.34 MU/L (ref 0.4–4)

## 2018-12-04 PROCEDURE — 99213 OFFICE O/P EST LOW 20 MIN: CPT | Performed by: NURSE PRACTITIONER

## 2018-12-04 PROCEDURE — 36415 COLL VENOUS BLD VENIPUNCTURE: CPT | Performed by: NURSE PRACTITIONER

## 2018-12-04 PROCEDURE — 84443 ASSAY THYROID STIM HORMONE: CPT | Performed by: CLINICAL NURSE SPECIALIST

## 2018-12-04 PROCEDURE — 93000 ELECTROCARDIOGRAM COMPLETE: CPT | Performed by: NURSE PRACTITIONER

## 2018-12-04 PROCEDURE — 82306 VITAMIN D 25 HYDROXY: CPT | Performed by: NURSE PRACTITIONER

## 2018-12-04 RX ORDER — BUSPIRONE HYDROCHLORIDE 10 MG/1
TABLET ORAL
Refills: 0 | COMMUNITY
Start: 2018-10-26 | End: 2023-02-13

## 2018-12-04 RX ORDER — PRAZOSIN HYDROCHLORIDE 1 MG/1
CAPSULE ORAL
Refills: 0 | COMMUNITY
Start: 2018-10-26 | End: 2024-08-12

## 2018-12-04 ASSESSMENT — PAIN SCALES - GENERAL: PAINLEVEL: NO PAIN (0)

## 2018-12-04 NOTE — PROGRESS NOTES
SUBJECTIVE:   Ayesha Casas is a 28 year old female who presents to clinic today for the following health issues:      EKG for Psych    28 year old female presents with her mom for EKG as requested by her psychiatrist who is not associated with [facility]. Reports that sometimes she gets palpitations when she gets anxious otherwise is asymptomatic. No chest pain, LE edema, headaches. Doesn't currently exercise or watch diet.    Problem list and histories reviewed & adjusted, as indicated.  Additional history: as documented    Patient Active Problem List   Diagnosis     CARDIOVASCULAR SCREENING; LDL GOAL LESS THAN 160     Anxiety     Tobacco use disorder     Adjustment disorder with anxious mood     Borderline personality disorder (H)     Intermittent asthma     Obesity, Class I, BMI 30-34.9     Vitamin D deficiency     Past Surgical History:   Procedure Laterality Date     NO HISTORY OF SURGERY         Social History   Substance Use Topics     Smoking status: Current Every Day Smoker     Packs/day: 0.20     Types: Cigarettes     Smokeless tobacco: Never Used     Alcohol use Yes      Comment: beer 2/d     Family History   Problem Relation Age of Onset     Hypertension Maternal Grandmother      Diabetes Maternal Grandfather      Cancer - colorectal Maternal Grandfather      Breast Cancer Maternal Aunt          Current Outpatient Prescriptions   Medication Sig Dispense Refill     albuterol (PROAIR HFA/PROVENTIL HFA/VENTOLIN HFA) 108 (90 Base) MCG/ACT inhaler Inhale 2 puffs into the lungs every 6 hours 1 Inhaler 5     Benzoyl Peroxide 2.5 % CREA Externally apply topically 2 times daily 1 Tube 3     busPIRone (BUSPAR) 10 MG tablet TK 1/2 T PO TID  0     clindamycin (CLINDAGEL) 1 % topical gel Apply topically daily 60 g 11     hydrocortisone (ANUSOL-HC) 2.5 % rectal cream Place rectally 2 times daily 30 g 1     ibuprofen (ADVIL/MOTRIN) 600 MG tablet Take 1 tablet (600 mg) by mouth every 8 hours as needed for moderate  "pain 30 tablet 1     ibuprofen 200 MG capsule Take 200 mg by mouth every 4 hours as needed       LORazepam (ATIVAN) 1 MG tablet Take 1 tablet (1 mg) by mouth daily as needed for anxiety 30 tablet 0     prazosin (MINIPRESS) 1 MG capsule TK 1 C PO HS  0     triamcinolone (KENALOG) 0.1 % cream Apply sparingly to affected area three times daily for 14 days. 30 g 0     Allergies   Allergen Reactions     Penicillins        Reviewed and updated as needed this visit by clinical staff  Tobacco  Allergies  Meds  Problems  Med Hx  Surg Hx  Fam Hx  Soc Hx        Reviewed and updated as needed this visit by Provider  Allergies  Meds  Problems         ROS:  Constitutional, HEENT, cardiovascular, pulmonary, GI, , musculoskeletal, neuro, skin, endocrine and psych systems are negative, except as otherwise noted.    OBJECTIVE:     /78 (BP Location: Right arm, Patient Position: Chair, Cuff Size: Adult Regular)  Pulse 90  Temp 98.3  F (36.8  C) (Oral)  Resp 16  Ht 4' 10.25\" (1.48 m)  Wt 156 lb (70.8 kg)  LMP 11/19/2018 (Exact Date)  SpO2 99%  Breastfeeding? No  BMI 32.32 kg/m2  Body mass index is 32.32 kg/(m^2).  GENERAL: healthy, alert and no distress  EYES: Eyes grossly normal to inspection, PERRL and conjunctivae and sclerae normal  HENT: ear canals and TM's normal, nose and mouth without ulcers or lesions  NECK: no adenopathy, no asymmetry, masses, or scars and thyroid normal to palpation  RESP: lungs clear to auscultation - no rales, rhonchi or wheezes  CV: regular rate and rhythm, normal S1 S2, no S3 or S4, no murmur, click or rub, no peripheral edema and peripheral pulses strong  MS: no gross musculoskeletal defects noted, no edema  PSYCH: mentation appears normal, affect normal/bright    Diagnostic Test Results:  Results for orders placed or performed in visit on 12/04/18 (from the past 24 hour(s))   EKG 12-lead complete w/read - Clinics    Narrative    Sinus rhythm  HR 79      QTcH 417 "       ASSESSMENT/PLAN:       ICD-10-CM    1. Anxiety F41.9 EKG 12-lead complete w/read - Clinics   2. Medication management Z79.899 EKG 12-lead complete w/read - Clinics     EKG with sinus rhythm. No ectopy and no prolonged QT. Copy provided to patient to share with psychiatrist.    See Patient Instructions    Patient and mom verbalizes understanding and agrees with plan of care. Patient stable for discharge.      ROZ Barahona [facility]

## 2018-12-04 NOTE — MR AVS SNAPSHOT
After Visit Summary   12/4/2018    Ayesha Cassa    MRN: [id number]           Patient Information     Date Of Birth          1990        Visit Information        Provider Department      12/4/2018 2:00 PM Barbara Herrera APRN CNP [facility]        Today's Diagnoses     Anxiety    -  1    Medication management          Care Instructions    At [facility], we strive to deliver an exceptional experience to you, every time we see you.  If you receive a survey in the mail, please send us back your thoughts. We really do value your feedback.    Based on your medical history, these are the current health maintenance/preventive care services that you are due for (some may have been done at this visit.)  Health Maintenance Due   Topic Date Due     ASTHMA CONTROL TEST Q6 MOS  02/16/2018         Suggested websites for health information:  Www.OneTeamVisi : Up to date and easily searchable information on multiple topics.  Www.IntY.gov : medication info, interactive tutorials, watch real surgeries online  Www.familydoctor.org : good info from the Academy of Family Physicians  Www.cdc.gov : public health info, travel advisories, epidemics (H1N1)  Www.aap.org : children's health info, normal development, vaccinations  Www.health.[facility].[place].us : [place] dept of health, public health issues in [place], N1N1    Your care team:                            Family Medicine Internal Medicine   MD Marcus Padron MD Shantel Branch-Fleming, MD Katya Georgiev PA-C Nam Ho, MD Pediatrics   PRITESH López, MD Anita Sarmiento CNP, MD Deborah Mielke, MD Kim Thein, APRN CNP      Clinic hours: Monday - Thursday 7 am-7 pm; Fridays 7 am-5 pm.   Urgent care: Monday - Friday 11 am-9 pm; Saturday and Sunday 9 am-5 pm.  Pharmacy : Monday -Thursday 8 am-8 pm; Friday 8 am-6 pm; Saturday and Sunday 9 am-5  "pm.     Clinic: [phone]   Pharmacy: [phone]            Follow-ups after your visit        Follow-up notes from your care team     Return in about 1 week (around 2018), or if symptoms worsen or fail to improve.      Who to contact     If you have questions or need follow up information about today's clinic visit or your schedule please contact [facility] [place] directly at [phone].  Normal or non-critical lab and imaging results will be communicated to you by MyChart, letter or phone within 4 business days after the clinic has received the results. If you do not hear from us within 7 days, please contact the clinic through MyChart or phone. If you have a critical or abnormal lab result, we will notify you by phone as soon as possible.  Submit refill requests through [employer] or call your pharmacy and they will forward the refill request to us. Please allow 3 business days for your refill to be completed.          Additional Information About Your Visit        MyChart Information     [employer] lets you send messages to your doctor, view your test results, renew your prescriptions, schedule appointments and more. To sign up, go to [email or web address] . Click on \"Log in\" on the left side of the screen, which will take you to the Welcome page. Then click on \"Sign up Now\" on the right side of the page.     You will be asked to enter the access code listed below, as well as some personal information. Please follow the directions to create your username and password.     Your access code is: [email or web address]  Expires: 2018  8:24 AM     Your access code will  in 90 days. If you need help or a new code, please call your [facility] clinic or [phone].        Care EveryWhere ID     This is your Care EveryWhere ID. This could be used by other organizations to access your [facility] medical records  [id number]        Your Vitals Were     Pulse Temperature Respirations Height Last " "Period Pulse Oximetry    90 98.3  F (36.8  C) (Oral) 16 4' 10.25\" (1.48 m) 11/19/2018 (Exact Date) 99%    Breastfeeding? BMI (Body Mass Index)                No 32.32 kg/m2           Blood Pressure from Last 3 Encounters:   12/04/18 137/78   09/28/18 118/80   05/30/18 111/70    Weight from Last 3 Encounters:   12/04/18 156 lb (70.8 kg)   09/28/18 153 lb (69.4 kg)   05/30/18 154 lb (69.9 kg)              We Performed the Following     EKG 12-lead complete w/read - Clinics          Today's Medication Changes          These changes are accurate as of 12/4/18  3:24 PM.  If you have any questions, ask your nurse or doctor.               These medicines have changed or have updated prescriptions.        Dose/Directions    busPIRone 10 MG tablet   Commonly known as:  BUSPAR   This may have changed:  Another medication with the same name was removed. Continue taking this medication, and follow the directions you see here.   Changed by:  Barbara Herrera APRN CNP        TK 1/2 T PO TID   Refills:  0                Primary Care Provider Office Phone # Fax #    ROZ Rebolledo [phone] [address]  [facility] [address]        Equal Access to Services     [facility] LUIS ENRIQUE AH: Hadii aad ku hadasho Soomaali, waaxda luqadaha, qaybta kaalmada adeegyada, waxay nehemias haykathy osman . So [facility] [phone].    ATENCIÓN: Si habla español, tiene a norman disposición servicios gratuitos de asistencia lingüística. Llame al [phone].    We comply with applicable federal civil rights laws and [place] laws. We do not discriminate on the basis of race, color, national origin, age, disability, sex, sexual orientation, or gender identity.            Thank you!     Thank you for choosing [facility]  for your care. Our goal is always to provide you with excellent care. Hearing back from our patients is one way we can continue to improve our services. Please take a few minutes " to complete the written survey that you may receive in the mail after your visit with us. Thank you!             Your Updated Medication List - Protect others around you: Learn how to safely use, store and throw away your medicines at www.disposemymeds.org.          This list is accurate as of 12/4/18  3:24 PM.  Always use your most recent med list.                   Brand Name Dispense Instructions for use Diagnosis    albuterol 108 (90 Base) MCG/ACT inhaler    PROAIR HFA/PROVENTIL HFA/VENTOLIN HFA    1 Inhaler    Inhale 2 puffs into the lungs every 6 hours    Mild intermittent asthma without complication       Benzoyl Peroxide 2.5 % Crea     1 Tube    Externally apply topically 2 times daily    Acne vulgaris       busPIRone 10 MG tablet    BUSPAR     TK 1/2 T PO TID        clindamycin 1 % external gel    CLINDAGEL    60 g    Apply topically daily    Acne vulgaris       hydrocortisone 2.5 % cream    ANUSOL-HC    30 g    Place rectally 2 times daily    External hemorrhoids       * ibuprofen 200 MG capsule    ADVIL/MOTRIN     Take 200 mg by mouth every 4 hours as needed        * ibuprofen 600 MG tablet    ADVIL/MOTRIN    30 tablet    Take 1 tablet (600 mg) by mouth every 8 hours as needed for moderate pain    Obesity, Class I, BMI 30-34.9       LORazepam 1 MG tablet    ATIVAN    30 tablet    Take 1 tablet (1 mg) by mouth daily as needed for anxiety    Adjustment disorder with anxious mood       prazosin 1 MG capsule    MINIPRESS     TK 1 C PO HS        triamcinolone 0.1 % external cream    KENALOG    30 g    Apply sparingly to affected area three times daily for 14 days.    Dermatitis       * Notice:  This list has 2 medication(s) that are the same as other medications prescribed for you. Read the directions carefully, and ask your doctor or other care provider to review them with you.

## 2018-12-04 NOTE — PATIENT INSTRUCTIONS
At [facility], we strive to deliver an exceptional experience to you, every time we see you.  If you receive a survey in the mail, please send us back your thoughts. We really do value your feedback.    Based on your medical history, these are the current health maintenance/preventive care services that you are due for (some may have been done at this visit.)  Health Maintenance Due   Topic Date Due     ASTHMA CONTROL TEST Q6 MOS  02/16/2018         Suggested websites for health information:  Www.[employer].org : Up to date and easily searchable information on multiple topics.  Www.medlineplus.gov : medication info, interactive tutorials, watch real surgeries online  Www.familydoctor.org : good info from the Academy of Family Physicians  Www.cdc.gov : public health info, travel advisories, epidemics (H1N1)  Www.aap.org : children's health info, normal development, vaccinations  Www.health.[facility].[place].us : [place] dept of health, public health issues in [place], N1N1    Your care team:                            Family Medicine Internal Medicine   MD Marcus Padron MD Shantel Branch-Fleming, MD Katya Georgiev PA-C Nam Ho, MD Pediatrics   Yash Rice PABENTON Downing, CNP Kelly Verma APRN CNP   MD Anita Gomez MD Deborah Mielke, MD Kim Thein, APRN CNP      Clinic hours: Monday - Thursday 7 am-7 pm; Fridays 7 am-5 pm.   Urgent care: Monday - Friday 11 am-9 pm; Saturday and Sunday 9 am-5 pm.  Pharmacy : Monday -Thursday 8 am-8 pm; Friday 8 am-6 pm; Saturday and Sunday 9 am-5 pm.     Clinic: [phone]   Pharmacy: [phone]

## 2018-12-05 ENCOUNTER — TELEPHONE (OUTPATIENT)
Dept: FAMILY MEDICINE | Facility: CLINIC | Age: 28
End: 2018-12-05

## 2018-12-05 DIAGNOSIS — E55.9 VITAMIN D DEFICIENCY: Primary | ICD-10-CM

## 2018-12-05 LAB — DEPRECATED CALCIDIOL+CALCIFEROL SERPL-MC: 7 UG/L (ref 20–75)

## 2018-12-05 RX ORDER — ERGOCALCIFEROL 1.25 MG/1
50000 CAPSULE, LIQUID FILLED ORAL
Qty: 8 CAPSULE | Refills: 0 | Status: SHIPPED | OUTPATIENT
Start: 2018-12-05 | End: 2019-01-24

## 2018-12-05 NOTE — LETTER
December 7, 2018      Ayesha Casas  [address]  [place] [address]        Dear ,    We are writing to inform you of your test results.    Your lab result was abnormal.  Vit D is very low. I have sent replacement to the pharmacy. Take 1 pill every 7 days for 8 doses. Follow up in clinic in 2 months. At that time we'll put you on daily medication.    Results for orders placed or performed in visit on 12/04/18   **Vitamin D Deficiency FUTURE 2mo   Result Value Ref Range    Vitamin D Deficiency screening 7 (L) 20 - 75 ug/L   TSH   Result Value Ref Range    TSH 1.34 0.40 - 4.00 mU/L       If you have any questions or concerns, please call the clinic at the number listed above.       Sincerely,        ROZ Barahona CNP

## 2018-12-05 NOTE — TELEPHONE ENCOUNTER
Notes Recorded by Barbara Herrera APRN CNP on 12/5/2018 at 4:03 PM  Please call patient and let them know their lab result was abnormal.  Vit D is very low. I have sent replacement to the pharmacy. Take 1 pill every 7 days for 8 doses. Follow up in clinic in 2 months. At that time we'll put her on daily medication.    This writer attempted to contact [facility] on 12/05/18      Reason for call results and left message.      If patient calls back:   Patient contacted by a Registered Nurse. Inform patient that someone from the RN group will contact them, document that pt called and route to P DYAD 3 RN POOL [[id number]]        Susannah Villalobos RN

## 2018-12-05 NOTE — PROGRESS NOTES
Please call patient and let them know their lab result was abnormal.  Vit D is very low. I have sent replacement to the pharmacy. Take 1 pill every 7 days for 8 doses. Follow up in clinic in 2 months. At that time we'll put her on daily medication.

## 2018-12-06 NOTE — TELEPHONE ENCOUNTER
[facility]      Reason for call results and left message.      If patient calls back:   Patient contacted by a Registered Nurse. Inform patient that someone from the RN group will contact them, document that pt called and route to P DYAD 3 RN POOL [[id number]]        Susannah Villalobos RN

## 2018-12-07 NOTE — TELEPHONE ENCOUNTER
(3rd attempt) This writer attempted to contact [facility] on 12/07/18      Reason for call results and left message.      If patient calls back:   Patient contacted by a Registered Nurse. Inform patient that someone from the RN group will contact them, document that pt called and route to P DYAD 3 RN POOL [[id number]]        Siria Grimes RN

## 2018-12-10 NOTE — TELEPHONE ENCOUNTER
returned call wants TSH levels from these results please    Best number to reach caller: [phone]     Is it ok to leave a detailed message: YES

## 2018-12-10 NOTE — TELEPHONE ENCOUNTER
Reason for Call:  Other Results    Detailed comments: Pt returning phone call and would like a phone call back as soon as possible for Pt is at her Psychiatrist's office for an apt and her Psychiatrist is requesting verbal requests of her Vitamin D screening.    Phone Number Patient can be reached at: Home number on file [phone] (home)    Best Time: anytime    Can we leave a detailed message on this number? YES    Call taken on 12/10/2018 at 3:14 PM by Carlos Walton

## 2018-12-10 NOTE — TELEPHONE ENCOUNTER
This writer attempted to contact Ayesha on 12/10/18    Reason for call Vitamin D lab request and left detailed message.    When patient calls back, please contact 1st floor [facility]. routine priority.        Suleiman Landeros

## 2018-12-10 NOTE — TELEPHONE ENCOUNTER
Please notify:  Patient was prescribed vit D 1 capsule every 7 days x8 weeks and recheck at the end.

## 2018-12-10 NOTE — TELEPHONE ENCOUNTER
returned call wants TSH levels    Best number to reach caller: Cell number on file:    Telephone Information:   Mobile [phone]       Is it ok to leave a detailed message: YES

## 2019-04-18 NOTE — TELEPHONE ENCOUNTER
This writer attempted to contact Ayesha on 04/18/19    Reason for call update ACT and left message to return call.    When patient calls back, please contact 1st floor [facility]. routine priority.        Suleiman Landeros

## 2019-05-09 NOTE — TELEPHONE ENCOUNTER
This writer attempted to contact Ayesha on 05/09/19    Reason for call update ACT and left message to return call.    When patient calls back, please contact 1st floor [facility]. routine priority.        Suleiman Landeros

## 2019-07-29 ENCOUNTER — TELEPHONE (OUTPATIENT)
Dept: FAMILY MEDICINE | Facility: CLINIC | Age: 29
End: 2019-07-29

## 2019-07-29 NOTE — TELEPHONE ENCOUNTER
Panel Management Review   One phone call and send letter if unable to reach them or [employer] message and send letter if not read after 2 weeks (You will get a message to your inbasket)      BP Readings from Last 1 Encounters:   12/04/18 137/78    , No results found for: A1C, Visit date not found    Health Maintenance Due   Topic Date Due     ASTHMA CONTROL TEST  02/16/2018        Fail List measure: asthma         Patient is due/failing the following:   ACT    Action needed:   Patient needs to do ACT.    Type of outreach:    Copy of ACT mailed to patient, will reach out in 5 days.    Questions for provider review:    None                                                                                       Chart routed to n/jesi Walters

## 2019-07-29 NOTE — LETTER
July 29, 2019      Ayesha Casas  [address] [address]        Dear Ayesha Casas,      At [facility] we care about your health and are committed to providing quality patient care. It has come to our attention that you are due for an Asthma Control Test.     This screening tool helps us to assess how well your asthma is controlled. Good asthma control leads to less asthma symptoms and greater health. If your asthma is not in good control (score less than 20) it is recommended you be seen by your provider for medication and lifestyle adjustments    We have enclosed an  Asthma Control Test. Please complete the enclosed asthma control test even if you are feeling well. You can mail it back to our clinic or drop if off at our .     Thank you for your time and we hope this letter finds you well!      Sincerely,    Your Team at [facility]

## 2019-08-12 NOTE — TELEPHONE ENCOUNTER
This writer attempted to contact Patient on 08/12/19      Reason for call ACT and left message.      If patient calls back:   1st floor [facility] Team (MA/TC) called. Inform patient that someone from the team will contact them, document that pt called and route to care team.         Christie Walters

## 2020-03-14 NOTE — TELEPHONE ENCOUNTER
Letter created and mailed to patient's home address with lab results.  Konrad Bañuelos,  For Teams Comfort and Heart   Strong peripheral pulses

## 2022-11-02 NOTE — PATIENT INSTRUCTIONS
Preventive Health Recommendations  Female Ages 26 - 39  Yearly exam:   See your health care provider every year in order to    Review health changes.     Discuss preventive care.      Review your medicines if you your doctor has prescribed any.    Until age 30: Get a Pap test every three years (more often if you have had an abnormal result).    After age 30: Talk to your doctor about whether you should have a Pap test every 3 years or have a Pap test with HPV screening every 5 years.   You do not need a Pap test if your uterus was removed (hysterectomy) and you have not had cancer.  You should be tested each year for STDs (sexually transmitted diseases), if you're at risk.   Talk to your provider about how often to have your cholesterol checked.  If you are at risk for diabetes, you should have a diabetes test (fasting glucose).  Shots: Get a flu shot each year. Get a tetanus shot every 10 years.   Nutrition:     Eat at least 5 servings of fruits and vegetables each day.    Eat whole-grain bread, whole-wheat pasta and brown rice instead of white grains and rice.    Talk to your provider about Calcium and Vitamin D.     Lifestyle    Exercise at least 150 minutes a week (30 minutes a day, 5 days of the week). This will help you control your weight and prevent disease.    Limit alcohol to one drink per day.    No smoking.     Wear sunscreen to prevent skin cancer.    See your dentist every six months for an exam and cleaning.     Spoke with patient. She does not have any questions on her medications but is requesting a refill on her tramadol and alprazolam. Is PCP ok to refill?
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
## 2023-01-02 ENCOUNTER — OFFICE VISIT (OUTPATIENT)
Dept: FAMILY MEDICINE | Facility: CLINIC | Age: 33
End: 2023-01-02
Attending: FAMILY MEDICINE
Payer: MEDICARE

## 2023-01-02 VITALS
SYSTOLIC BLOOD PRESSURE: 135 MMHG | WEIGHT: 151 LBS | HEIGHT: 58 IN | BODY MASS INDEX: 31.7 KG/M2 | DIASTOLIC BLOOD PRESSURE: 88 MMHG | HEART RATE: 72 BPM

## 2023-01-02 DIAGNOSIS — E55.9 VITAMIN D DEFICIENCY: ICD-10-CM

## 2023-01-02 DIAGNOSIS — R82.90 ABNORMAL URINE ODOR: ICD-10-CM

## 2023-01-02 DIAGNOSIS — F17.200 TOBACCO USE DISORDER: ICD-10-CM

## 2023-01-02 DIAGNOSIS — G47.00 INSOMNIA, UNSPECIFIED TYPE: ICD-10-CM

## 2023-01-02 DIAGNOSIS — F10.20 ALCOHOL USE DISORDER, MODERATE, DEPENDENCE (H): ICD-10-CM

## 2023-01-02 DIAGNOSIS — Z00.00 ENCOUNTER FOR ANNUAL PHYSICAL EXAM: Primary | ICD-10-CM

## 2023-01-02 DIAGNOSIS — Z01.419 ENCOUNTER FOR GYNECOLOGICAL EXAMINATION: ICD-10-CM

## 2023-01-02 DIAGNOSIS — J45.20 MILD INTERMITTENT ASTHMA WITHOUT COMPLICATION: ICD-10-CM

## 2023-01-02 DIAGNOSIS — Z20.828 CONTACT WITH OR EXPOSURE TO VIRAL DISEASE: ICD-10-CM

## 2023-01-02 DIAGNOSIS — F41.1 GAD (GENERALIZED ANXIETY DISORDER): ICD-10-CM

## 2023-01-02 DIAGNOSIS — F33.1 MODERATE EPISODE OF RECURRENT MAJOR DEPRESSIVE DISORDER (H): ICD-10-CM

## 2023-01-02 LAB
ALBUMIN UR-MCNC: 20 MG/DL
AMORPH CRY #/AREA URNS HPF: ABNORMAL /HPF
APPEARANCE UR: ABNORMAL
BACTERIA #/AREA URNS HPF: ABNORMAL /HPF
BILIRUB UR QL STRIP: NEGATIVE
COLOR UR AUTO: ABNORMAL
GLUCOSE UR STRIP-MCNC: NEGATIVE MG/DL
HGB UR QL STRIP: NEGATIVE
KETONES UR STRIP-MCNC: NEGATIVE MG/DL
LEUKOCYTE ESTERASE UR QL STRIP: NEGATIVE
MUCOUS THREADS #/AREA URNS LPF: PRESENT /LPF
NITRATE UR QL: POSITIVE
PH UR STRIP: 6 [PH] (ref 5–7)
RBC URINE: 0 /HPF
SP GR UR STRIP: 1.02 (ref 1–1.03)
SQUAMOUS EPITHELIAL: 3 /HPF
T VAGINALIS DNA SPEC QL NAA+PROBE: NOT DETECTED
UROBILINOGEN UR STRIP-MCNC: NORMAL MG/DL
WBC URINE: 0 /HPF

## 2023-01-02 PROCEDURE — 87186 SC STD MICRODIL/AGAR DIL: CPT | Performed by: FAMILY MEDICINE

## 2023-01-02 PROCEDURE — 90715 TDAP VACCINE 7 YRS/> IM: CPT

## 2023-01-02 PROCEDURE — G0463 HOSPITAL OUTPT CLINIC VISIT: HCPCS | Mod: 25 | Performed by: FAMILY MEDICINE

## 2023-01-02 PROCEDURE — 90471 IMMUNIZATION ADMIN: CPT

## 2023-01-02 PROCEDURE — 87624 HPV HI-RISK TYP POOLED RSLT: CPT | Performed by: FAMILY MEDICINE

## 2023-01-02 PROCEDURE — G0145 SCR C/V CYTO,THINLAYER,RESCR: HCPCS | Performed by: FAMILY MEDICINE

## 2023-01-02 PROCEDURE — 87661 TRICHOMONAS VAGINALIS AMPLIF: CPT | Performed by: FAMILY MEDICINE

## 2023-01-02 PROCEDURE — 81001 URINALYSIS AUTO W/SCOPE: CPT | Performed by: FAMILY MEDICINE

## 2023-01-02 PROCEDURE — 87491 CHLMYD TRACH DNA AMP PROBE: CPT | Performed by: FAMILY MEDICINE

## 2023-01-02 PROCEDURE — 99214 OFFICE O/P EST MOD 30 MIN: CPT | Mod: 25 | Performed by: FAMILY MEDICINE

## 2023-01-02 PROCEDURE — 99385 PREV VISIT NEW AGE 18-39: CPT | Mod: GY | Performed by: FAMILY MEDICINE

## 2023-01-02 PROCEDURE — 250N000011 HC RX IP 250 OP 636

## 2023-01-02 RX ORDER — ALBUTEROL SULFATE 90 UG/1
2 AEROSOL, METERED RESPIRATORY (INHALATION) EVERY 6 HOURS PRN
Qty: 18 G | Refills: 1 | Status: SHIPPED | OUTPATIENT
Start: 2023-01-02 | End: 2023-11-13

## 2023-01-02 RX ORDER — ESCITALOPRAM OXALATE 5 MG/1
5 TABLET ORAL DAILY
Qty: 30 TABLET | Refills: 1 | Status: SHIPPED | OUTPATIENT
Start: 2023-01-02 | End: 2024-08-12

## 2023-01-02 RX ORDER — TRAZODONE HYDROCHLORIDE 50 MG/1
50 TABLET, FILM COATED ORAL AT BEDTIME
Qty: 30 TABLET | Refills: 0 | Status: SHIPPED | OUTPATIENT
Start: 2023-01-02 | End: 2023-10-25

## 2023-01-02 NOTE — PATIENT INSTRUCTIONS
Congratulations on working so hard on your health!     Tests we did today:  - STI tests  - Pap test  - Blood tests for diabetes and general health  - Urine test and vaginal swabs for symptoms    Vaccines today:  - Tetanus    Medications to start:  - Trazadone - take at bedtime  - Lexapro for anxiety  - Also sent albuterol prescription    Follow-up plans:  - Return with next menses for Depo   -  will call you to offer resources for alcohol use and mental health  - Sleep study ordered  - Continue with therapist  - Hopefully the meds we started will also help with sleep, back pain, headaches; if not we can discuss at next visit  - Schedule follow-up with me in 1 month: at next visit we can discuss cannabis prescription, nutrition/exercise, re-checking asthma

## 2023-01-02 NOTE — NURSING NOTE
Chief Complaint   Patient presents with     Physical     Physical , pap, establish care . Last pap 08.16.2017 NILM , odor in urine

## 2023-01-03 ENCOUNTER — TELEPHONE (OUTPATIENT)
Dept: OBGYN | Facility: CLINIC | Age: 33
End: 2023-01-03

## 2023-01-03 ENCOUNTER — PATIENT OUTREACH (OUTPATIENT)
Dept: CARE COORDINATION | Facility: CLINIC | Age: 33
End: 2023-01-03

## 2023-01-03 ENCOUNTER — MYC MEDICAL ADVICE (OUTPATIENT)
Dept: FAMILY MEDICINE | Facility: CLINIC | Age: 33
End: 2023-01-03

## 2023-01-03 LAB
C TRACH DNA SPEC QL PROBE+SIG AMP: NEGATIVE
N GONORRHOEA DNA SPEC QL NAA+PROBE: NEGATIVE

## 2023-01-03 NOTE — TELEPHONE ENCOUNTER
[facility] Call Center    Phone Message    May a detailed message be left on voicemail: yes     Reason for Call: Pt returned call from yesterday. Writer could not find info on why clinic called pt. Please give pt a call back.     Action Taken: Other: [facility]    Travel Screening: Not Applicable

## 2023-01-03 NOTE — TELEPHONE ENCOUNTER
Called patient back, reached VM. Left clinic number to call back with additional questions/concerns.

## 2023-01-03 NOTE — PROGRESS NOTES
Clinic Care Coordination Contact  [facility]/Voicemail    Clinical Data:  CC Outreach  Outreach attempted on 1/3/23; total outreach attempts x 1.  [facility] left message on [employer] with call back information and requested return call.  Additional Information:  with [facility] sent referral for resources for supports for Mental Health and Substance Abuse.  Status: Patient is on  CC panel, status as potential.  Plan: [facility] to continue outreach attempts.    DEBRA Sanchez  , Care Coordination  [facility]  [facility]  [facility]  [phone]

## 2023-01-04 DIAGNOSIS — N30.00 ACUTE CYSTITIS WITHOUT HEMATURIA: Primary | ICD-10-CM

## 2023-01-04 LAB
BACTERIA UR CULT: ABNORMAL
BKR LAB AP GYN ADEQUACY: NORMAL
BKR LAB AP GYN INTERPRETATION: NORMAL
BKR LAB AP HPV REFLEX: NORMAL
BKR LAB AP LMP: NORMAL
BKR LAB AP PREVIOUS ABNORMAL: NORMAL
PATH REPORT.COMMENTS IMP SPEC: NORMAL
PATH REPORT.COMMENTS IMP SPEC: NORMAL
PATH REPORT.RELEVANT HX SPEC: NORMAL

## 2023-01-04 RX ORDER — SULFAMETHOXAZOLE/TRIMETHOPRIM 800-160 MG
1 TABLET ORAL 2 TIMES DAILY
Qty: 14 TABLET | Refills: 0 | Status: SHIPPED | OUTPATIENT
Start: 2023-01-04 | End: 2023-02-13

## 2023-01-05 ENCOUNTER — PATIENT OUTREACH (OUTPATIENT)
Dept: CARE COORDINATION | Facility: CLINIC | Age: 33
End: 2023-01-05

## 2023-01-05 LAB
HUMAN PAPILLOMA VIRUS 16 DNA: NEGATIVE
HUMAN PAPILLOMA VIRUS 18 DNA: NEGATIVE
HUMAN PAPILLOMA VIRUS FINAL DIAGNOSIS: NORMAL
HUMAN PAPILLOMA VIRUS OTHER HR: NEGATIVE

## 2023-01-05 NOTE — PROGRESS NOTES
Clinic Care Coordination Contact  [facility]/Voicemail    Clinical Data: SW CC Outreach  Outreach attempted on 1/5/23 ; total outreach attempts x 2.  SW CC unable to leave voicemail.  Status: Patient is on  CC panel, status as identified.  Plan: SW CC to has sent [facility] letter.    DEBRA Sanchez  , Care Coordination  [facility]  [facility]  [facility]  [phone]

## 2023-01-05 NOTE — PROGRESS NOTES
Clinic Care Coordination Contact  Brief Contact        Clinical Data: LILIANA BLANC received phone call from Ayesha's phone number. LILIANA BLANC answered and person states she is Ayesha's mother and that she helps her answer and field calls. She has asked LILIANA BLANC to call the cell phone number which is the secondary number listed.  Status: Patient is on  CC panel, status as identified.  Plan: LILIANA BLANC to outreach to patient on the secondary line.    DEBRA Sanchez  , Care Coordination  [facility]  [facility]  [facility]  [phone]

## 2023-01-05 NOTE — PROGRESS NOTES
Clinic Care Coordination Contact  [facility]/Voicemail    Clinical Data:  CC Outreach  Outreach attempted on 1/5/23 ; total outreach attempts x2.  [facility] left message on [employer] with call back information and requested return call.  Additional Information:  Status: Patient is on  CC panel, status as potential.  Plan: [facility] to send [facility] letter via my chart.     DEBRA Sanchez  , Care Coordination  [facility]  [facility]  [facility]  [phone]

## 2023-01-05 NOTE — LETTER
[facility]ARE COORDINATION  ***  January 5, 2023    Ayesha Casas   [address] [facility] [facility] [address]      Dear Ayesha,    I have been attempting to reach you since our last contact. I would like to continue to work with you and provide any additional support you may need on achieving your health care related goals. I would appreciate if you would give me a call at  to let me know if you would like to continue working together. I know that there are many things that can affect our ability to communicate and I hope we can continue to work together.    All of us at the [facility] are invested in your health and are here to assist you in meeting your goals.     Sincerely,    Valencia Obando

## 2023-01-05 NOTE — LETTER
[facility]ARE COORDINATION  [address]  [place] [address]    January 5, 2023    Ayesha Casas  [address]  [place] [address]      Dear Ayesha,    I have been attempting to reach you. I would like to work with you and provide any additional support you may need on achieving your health care related goals. I would appreciate if you would give me a call at [phone] to let me know if you would like to work together. I know that there are many things that can affect our ability to communicate and I hope we can continue to work together.    All of us at the [facility] are invested in your health and are here to assist you in meeting your goals.     Sincerely,    DEBRA Sanchez  , Care Coordination  [facility]  [facility]  [facility]  [phone]

## 2023-01-12 NOTE — PROGRESS NOTES
Clinic Care Coordination Contact    Situation: Patient chart reviewed by care coordinator.    Background: Womens clinic referral from  for mental health and addiction resources.    Assessment: LILIANA CC made 2 attempts on 1/3 and 1/5.    Plan/Recommendations: No further outreaches will be made at this time unless a new referral is made or a change in the patient's status occurs. Ayesha was provided with SW CC contact information and encouraged to call with any questions or concerns.    DEBRA Sanchez  , Care Coordination  [facility]  [facility]  [facility]  [phone]

## 2023-02-10 NOTE — PROGRESS NOTES
Ayesha is a 32 year old pt presenting with her mother for a depo provera shot and management of headaches with a PMH of anxiety, depression, alcohol use disorder and insomnia.    Chronic tension-type headache, intractable  Pt with chronic headaches for 2 years which have gotten worse and now daily. Could be tension headaches given hx of anxiety and tension in upper posterior thoracic muscles vs high blood pressure given readings of 140s/130s over 80s in the clinic vs rebound syndrome as pt stopped taking high amounts of ibuprofen daily.Plan:  - Adult Neurology  Referral  -Order BP cuff    Encounter for initial prescription of injectable contraceptive  Currently sexually active and interested in starting depo provera today. LMP 1/29/2023. Pregnancy test negative today.  Plan:  - medroxyPROGESTERone (DEPO-PROVERA) syringe 150 given in clinic 2/13/2023  - pregnancy test ordered, negative    Elevated blood pressure reading without diagnosis of hypertension  Blood pressure readings have been in the 140s over 80s in the clinic for last few visits - this is pre-hypertension , has not been checking blood pressures at home but open to start doing that.  Plan:  - Order DME,  For BP cuff - will start taking blood pressure at home    Anxiety  Hx of anxiety with DIVYA 18 today. Has multiple family issues - illness and deaths -  Pt also describes tingling in the arms and fingers at times.  This could be hyperventilation associated with anxiety. Neuro exam was normal.   Discussed doing meditation or yoga to help with relaxation, and that not starting medication for anxiety at this time due to risk of dependence. Pt interested in medical marijuana to help with anxiety, but discussed that we will wait on starting this until headaches under more control and  try increased lexapro.  Plan:  - Lexapro increase to 10mg daily  -Recommended yoga and meditation  -Continue therapy    Moderate episode of recurrent major depressive  disorder (H)  Hx of depression which has been persistent with recent deaths in the family, PHQ 9 14 today. No harmful thoughts   Plan:   - Continue therapy  - Lexapro increase to 10mg daily    Primary insomnia  Hx of insomnia which has improved since starting trazadone, now sleeping 10-4. Some episodes of waking up gasping for air.  Plan:  - Trazadone 50 mg daily  - Has sleep study scheduled for April    Chest pain, unspecified type  Has episodes of chest pain of unknown etiology. Diagnosis could be panic attacks vs cardiovascular cause. Given recurrent nature of chest pain, may be anxiety related. Offered EKG and discussed how EKG would show if any past cardiac events had occurred. Pt declined EKG at this time. Discussed going into the emergency department if has episode of intense chest pain lasting more than 15 minutes. Pt and mother verbalized understanding.  Plan:  -Counseled on going to ER with intense episodes > 15 minutes  -Increase lexapro to 10 mg daily    Alcohol use disorder, mild, abuse  Has a hx of alcohol use disorder which has improved recently, now having 1 shot liquor daily.  Plan:   -Continue therapy  -Declined SW referral    Return to clinic in 1 month    Mari Ford MD, PhD    Time note (e5, 40'): The total of my time (on the date of service) for this service was 60 minutes, including discussion/face-to-face, chart review, interpretation not otherwise reported, documentation, and updating of the computerized record.          Juan Miguel Hodge is a 32 year old presenting to the clinic with her mother for the following health issues:    Depo: Currently sexually active, last had sexual intercourse 4-5 months ago. Was on depo for 9 years and then stopped taking for 6 years, currently not on any birth control medication. Would like depo today. LMP 1/29/2023, was normal. Pregnancy test today is negative. Depo administered today 2/13/2023.    Insomnia: Currently taking trazadone 50 mg at night,  also on minipress. Sleep has been a little bit better (sleeps from 10-4), but still waking up gasping for air on occasion. Has sleep study scheduled for April.     Alcohol use disorder: Referral was sent in beginning of January for resources for supports for Mental Health and Substance Abuse. SW attempted outreach twice but did not reach patient. Patient feels she does not need another  referral at this time. Alcohol use disorder has been better- was drinking 1/5 daily, now currently drinking 1 non-alcoholic beer every day which helps with cravings, 1 shot of liquor per day. Feeling good about reduced alcohol use today. Therapy has also helped with this.    Headaches: Has had long standing headaches going on for about 2 years in total which have now been getting worse. Now has them every day.They feel like pressure, pounding sensation in the temples, and are 10/10 on pain scale. They start occiptal and then temporal. Headaches sometimes accompanied by blurry vision, anxiety, and tingling in arms and fingers (mostly the left arm, pt is right handed mostly).  Headaches get worse before period. Sun also bothers her eyes. Used to take 4 ibuprofen daily (2 in morning and 2 in evening) to manage headaches but stopped taking after hearing about potential adverse effects. Now taking tylenol to help with headaches.     High blood pressure:  Has not been checking blood pressure at home recently and recent BP here has been in the 130's.  Doesn't have a home cuff. Does not live close to a [facility] or place with a bp cuff to use.    Chest pain: Pt's headaches have also been associated with chest pressure. Episodes of chest pressure last about 10-15 minutes. Chest pressure is oftentimes accompanied by shortness of breath, headaches, fatigue, and pain going into the shoulder and pain and tingling going down her arms (usually tingling and pain is in her left arm).  Sometimes they come on while playing video games, or sometimes  "while walking around or when patient gets anxious. Also notices palpitations at times, which are associated with her anxiety.     MDD: Mental health has been poor due to having lots of recent deaths and illness in the family. Talking to a therapist monthly  currently which has helped. Currently on lexapro 5mg daily which is helping some.  PHQ9 14 today.    Anxiety- Sees therapist 1x/month (has been seeing this therapist for 5 and 1/2 yrs). Therapist only has room in schedule for one visit monthly. Interested in medical marijuana prescription to help with anxiety. DIVYA 18 today.     Social history- Lives in a townhouse by self with puppy. Used to smoke 1 pack per day, now smokes 1 cigarette per day. Marijuana but no other recreational drug use.         Review of Systems   CONSTITUTIONAL: night sweats, NEGATIVE for fever, chills, change in weight   INTEGUMENTARY/SKIN: Some new freckles on back, NEGATIVE for worrisome rashes, moles or lesions  EYES: NEGATIVE for vision changes or irritation and blurred vision bilateral  ENT/MOUTH: nasal congestion, stuffiness, cough, problems with teeth/gums NEGATIVE for ear, mouth and throat problems  RESP: Couth, SOB  CV: chest pain/pressure and palpitations, no edema  GI: Constipation, light colored stool NEGATIVE for nausea, abdominal pain, heartburn, or change in bowel habits  : NONE  MUSCULOSKELETAL: POSITIVE Back pain and joint pain, NEGATIVE for significant arthralgias or myalgia  NEURO: numbness in bilateral arms/fingers, mostly left arm, NEGATIVE for weakness, dizziness or paresthesias  ENDOCRINE: polydipsia, NEGATIVE for temperature intolerance, skin/hair changes  HEME/ALLERGY/IMMUNE: NEGATIVE for bleeding problems  PSYCHIATRIC: anxiety and depressed mood, trouble sleeping NEGATIVE for changes in mood or affect      Objective    LMP 12/25/2022   There is no height or weight on file to calculate BMI.   /89   Pulse 96   Ht 1.448 m (4' 9\")   Wt 67.6 kg (149 lb)   LMP " 12/25/2022   BMI 32.24 kg/m      Physical Exam   GENERAL: , alert and no distress here with her mom  EYES: Eyes grossly normal to inspection, PERRL and conjunctivae and sclerae normal  NECK: no adenopathy, no asymmetry, masses, or scars and thyroid normal to palpation  RESP: lungs clear to auscultation - no rales, rhonchi or wheezes  CV: regular rate and rhythm, normal S1 S2, no S3 or S4, no murmur, click or rub, no peripheral edema   MS: tense upper posterior thoracic muscles bilaterally,  no gross musculoskeletal defects noted, no edema. No tenderness to temple palpation   SKIN: freckles on posterior thorax   NEURO: cranial nerves intact, motor strength 5/5 in UE and LE; reflexes brisk and 3/4 and = bilaterally, gait was normal, sensory intact UE and LE   PSYCH: mentation appears normal, affect normal/bright  LYMPH: no cervical, supraclavicular adenopathy    Notes reviewed    Mari Ford MD, PhD

## 2023-02-13 ENCOUNTER — OFFICE VISIT (OUTPATIENT)
Dept: FAMILY MEDICINE | Facility: CLINIC | Age: 33
End: 2023-02-13
Attending: FAMILY MEDICINE
Payer: MEDICARE

## 2023-02-13 ENCOUNTER — TELEPHONE (OUTPATIENT)
Dept: OBGYN | Facility: CLINIC | Age: 33
End: 2023-02-13

## 2023-02-13 ENCOUNTER — LAB (OUTPATIENT)
Dept: LAB | Facility: CLINIC | Age: 33
End: 2023-02-13
Attending: FAMILY MEDICINE
Payer: MEDICARE

## 2023-02-13 VITALS
HEART RATE: 96 BPM | HEIGHT: 57 IN | BODY MASS INDEX: 32.15 KG/M2 | DIASTOLIC BLOOD PRESSURE: 89 MMHG | WEIGHT: 149 LBS | SYSTOLIC BLOOD PRESSURE: 139 MMHG

## 2023-02-13 DIAGNOSIS — Z20.828 CONTACT WITH OR EXPOSURE TO VIRAL DISEASE: ICD-10-CM

## 2023-02-13 DIAGNOSIS — F51.01 PRIMARY INSOMNIA: ICD-10-CM

## 2023-02-13 DIAGNOSIS — Z30.013 ENCOUNTER FOR INITIAL PRESCRIPTION OF INJECTABLE CONTRACEPTIVE: ICD-10-CM

## 2023-02-13 DIAGNOSIS — R03.0 ELEVATED BLOOD PRESSURE READING WITHOUT DIAGNOSIS OF HYPERTENSION: ICD-10-CM

## 2023-02-13 DIAGNOSIS — F41.9 ANXIETY: ICD-10-CM

## 2023-02-13 DIAGNOSIS — R82.90 ABNORMAL URINE ODOR: ICD-10-CM

## 2023-02-13 DIAGNOSIS — E55.9 VITAMIN D DEFICIENCY: ICD-10-CM

## 2023-02-13 DIAGNOSIS — R07.9 CHEST PAIN, UNSPECIFIED TYPE: ICD-10-CM

## 2023-02-13 DIAGNOSIS — F33.1 MODERATE EPISODE OF RECURRENT MAJOR DEPRESSIVE DISORDER (H): ICD-10-CM

## 2023-02-13 DIAGNOSIS — F10.10 ALCOHOL USE DISORDER, MILD, ABUSE: ICD-10-CM

## 2023-02-13 DIAGNOSIS — Z00.00 ENCOUNTER FOR ANNUAL PHYSICAL EXAM: ICD-10-CM

## 2023-02-13 DIAGNOSIS — G44.221 CHRONIC TENSION-TYPE HEADACHE, INTRACTABLE: Primary | ICD-10-CM

## 2023-02-13 LAB
ANION GAP SERPL CALCULATED.3IONS-SCNC: 10 MMOL/L (ref 7–15)
BUN SERPL-MCNC: 11.3 MG/DL (ref 6–20)
CALCIUM SERPL-MCNC: 9.1 MG/DL (ref 8.6–10)
CHLORIDE SERPL-SCNC: 103 MMOL/L (ref 98–107)
CHOLEST SERPL-MCNC: 186 MG/DL
CREAT SERPL-MCNC: 0.57 MG/DL (ref 0.51–0.95)
DEPRECATED HCO3 PLAS-SCNC: 26 MMOL/L (ref 22–29)
ERYTHROCYTE [DISTWIDTH] IN BLOOD BY AUTOMATED COUNT: 12.3 % (ref 10–15)
GFR SERPL CREATININE-BSD FRML MDRD: >90 ML/MIN/1.73M2
GLUCOSE SERPL-MCNC: 104 MG/DL (ref 70–99)
HBA1C MFR BLD: 5.2 %
HCG UR QL: NEGATIVE
HCT VFR BLD AUTO: 39.1 % (ref 35–47)
HDLC SERPL-MCNC: 72 MG/DL
HGB BLD-MCNC: 13.1 G/DL (ref 11.7–15.7)
INTERNAL QC OK POCT: NORMAL
LDLC SERPL CALC-MCNC: 78 MG/DL
MCH RBC QN AUTO: 31.6 PG (ref 26.5–33)
MCHC RBC AUTO-ENTMCNC: 33.5 G/DL (ref 31.5–36.5)
MCV RBC AUTO: 94 FL (ref 78–100)
NONHDLC SERPL-MCNC: 114 MG/DL
PLATELET # BLD AUTO: 337 10E3/UL (ref 150–450)
POCT KIT EXPIRATION DATE: NORMAL
POCT KIT LOT NUMBER: NORMAL
POTASSIUM SERPL-SCNC: 3.7 MMOL/L (ref 3.4–5.3)
RBC # BLD AUTO: 4.15 10E6/UL (ref 3.8–5.2)
SODIUM SERPL-SCNC: 139 MMOL/L (ref 136–145)
TRIGL SERPL-MCNC: 178 MG/DL
WBC # BLD AUTO: 8.2 10E3/UL (ref 4–11)

## 2023-02-13 PROCEDURE — 250N000011 HC RX IP 250 OP 636: Performed by: FAMILY MEDICINE

## 2023-02-13 PROCEDURE — 87389 HIV-1 AG W/HIV-1&-2 AB AG IA: CPT

## 2023-02-13 PROCEDURE — 96372 THER/PROPH/DIAG INJ SC/IM: CPT | Performed by: FAMILY MEDICINE

## 2023-02-13 PROCEDURE — G0463 HOSPITAL OUTPT CLINIC VISIT: HCPCS | Mod: 25 | Performed by: FAMILY MEDICINE

## 2023-02-13 PROCEDURE — 83036 HEMOGLOBIN GLYCOSYLATED A1C: CPT

## 2023-02-13 PROCEDURE — 80061 LIPID PANEL: CPT

## 2023-02-13 PROCEDURE — 80048 BASIC METABOLIC PNL TOTAL CA: CPT

## 2023-02-13 PROCEDURE — 36415 COLL VENOUS BLD VENIPUNCTURE: CPT

## 2023-02-13 PROCEDURE — 82306 VITAMIN D 25 HYDROXY: CPT

## 2023-02-13 PROCEDURE — 86803 HEPATITIS C AB TEST: CPT

## 2023-02-13 PROCEDURE — 81025 URINE PREGNANCY TEST: CPT | Performed by: FAMILY MEDICINE

## 2023-02-13 PROCEDURE — 85027 COMPLETE CBC AUTOMATED: CPT

## 2023-02-13 PROCEDURE — 86780 TREPONEMA PALLIDUM: CPT

## 2023-02-13 PROCEDURE — 99215 OFFICE O/P EST HI 40 MIN: CPT | Performed by: FAMILY MEDICINE

## 2023-02-13 RX ORDER — SERTRALINE HYDROCHLORIDE 25 MG/1
25 TABLET, FILM COATED ORAL
COMMUNITY
End: 2023-03-14

## 2023-02-13 RX ORDER — NORTRIPTYLINE HCL 10 MG
CAPSULE ORAL
COMMUNITY
Start: 2021-04-12 | End: 2023-02-13

## 2023-02-13 RX ORDER — MEDROXYPROGESTERONE ACETATE 150 MG/ML
150 INJECTION, SUSPENSION INTRAMUSCULAR
Status: ACTIVE | OUTPATIENT
Start: 2023-02-13 | End: 2024-02-08

## 2023-02-13 RX ADMIN — MEDROXYPROGESTERONE ACETATE 150 MG: 150 INJECTION, SUSPENSION INTRAMUSCULAR at 14:47

## 2023-02-13 ASSESSMENT — ANXIETY QUESTIONNAIRES
7. FEELING AFRAID AS IF SOMETHING AWFUL MIGHT HAPPEN: MORE THAN HALF THE DAYS
6. BECOMING EASILY ANNOYED OR IRRITABLE: MORE THAN HALF THE DAYS
GAD7 TOTAL SCORE: 18
GAD7 TOTAL SCORE: 18
IF YOU CHECKED OFF ANY PROBLEMS ON THIS QUESTIONNAIRE, HOW DIFFICULT HAVE THESE PROBLEMS MADE IT FOR YOU TO DO YOUR WORK, TAKE CARE OF THINGS AT HOME, OR GET ALONG WITH OTHER PEOPLE: VERY DIFFICULT
5. BEING SO RESTLESS THAT IT IS HARD TO SIT STILL: MORE THAN HALF THE DAYS
1. FEELING NERVOUS, ANXIOUS, OR ON EDGE: NEARLY EVERY DAY
2. NOT BEING ABLE TO STOP OR CONTROL WORRYING: NEARLY EVERY DAY
3. WORRYING TOO MUCH ABOUT DIFFERENT THINGS: NEARLY EVERY DAY

## 2023-02-13 ASSESSMENT — PATIENT HEALTH QUESTIONNAIRE - PHQ9
SUM OF ALL RESPONSES TO PHQ QUESTIONS 1-9: 14
5. POOR APPETITE OR OVEREATING: NEARLY EVERY DAY

## 2023-02-13 NOTE — NURSING NOTE
Chief Complaint   Patient presents with     Contraception     Recheck Medication     Depression Response    Patient completed the PHQ-9 assessment for depression and scored >9? Yes  Question 9 on the PHQ-9 was positive for suicidality? No    Is this a virtual visit? No    I personally notified the following: visit provider

## 2023-02-13 NOTE — TELEPHONE ENCOUNTER
Ayesha is calling asking if HSV can be added to her bloodwork that was drawn this afternoon.    She does not have an outbreak.  Did have a sore on her mouth once in the past.    Would like to know if she carries the virus.    Routed to Dr Ford

## 2023-02-13 NOTE — PATIENT INSTRUCTIONS
Increase lexapro to 10mg/day  Get blood pressure cuff and take blood pressure 2x/wk and bring readings to next visit  Get lab work today  Neurology will call you to set up a HA visit  See me 1 month  For Depo - it is not effective for about 14 days   DEPO  only lasts for 90 days

## 2023-02-14 LAB
DEPRECATED CALCIDIOL+CALCIFEROL SERPL-MC: 6 UG/L (ref 20–75)
HCV AB SERPL QL IA: NONREACTIVE
HIV 1+2 AB+HIV1 P24 AG SERPL QL IA: NONREACTIVE
T PALLIDUM AB SER QL: NONREACTIVE

## 2023-02-16 DIAGNOSIS — E55.9 VITAMIN D DEFICIENCY: Primary | ICD-10-CM

## 2023-02-16 RX ORDER — ERGOCALCIFEROL 1.25 MG/1
1 CAPSULE, LIQUID FILLED ORAL WEEKLY
Qty: 8 CAPSULE | Refills: 0 | Status: SHIPPED | OUTPATIENT
Start: 2023-02-16 | End: 2023-04-14

## 2023-02-16 NOTE — PROGRESS NOTES
2-16-23 low vit D - will replace with [id number] weekly of D2 for 8 wks then 800IU/day continuous.  Mari Ford MD, PhD

## 2023-03-14 ENCOUNTER — OFFICE VISIT (OUTPATIENT)
Dept: NEUROLOGY | Facility: CLINIC | Age: 33
End: 2023-03-14
Attending: FAMILY MEDICINE
Payer: MEDICARE

## 2023-03-14 VITALS — HEART RATE: 82 BPM | OXYGEN SATURATION: 100 % | DIASTOLIC BLOOD PRESSURE: 86 MMHG | SYSTOLIC BLOOD PRESSURE: 120 MMHG

## 2023-03-14 DIAGNOSIS — G44.221 CHRONIC TENSION-TYPE HEADACHE, INTRACTABLE: ICD-10-CM

## 2023-03-14 DIAGNOSIS — M54.2 CHRONIC NECK PAIN: ICD-10-CM

## 2023-03-14 DIAGNOSIS — G89.29 CHRONIC NECK PAIN: ICD-10-CM

## 2023-03-14 DIAGNOSIS — G43.709 CHRONIC MIGRAINE WITHOUT AURA WITHOUT STATUS MIGRAINOSUS, NOT INTRACTABLE: ICD-10-CM

## 2023-03-14 DIAGNOSIS — F40.240 CLAUSTROPHOBIA: ICD-10-CM

## 2023-03-14 DIAGNOSIS — R29.2 ABNORMAL REFLEXES: ICD-10-CM

## 2023-03-14 DIAGNOSIS — R51.9 CHRONIC DAILY HEADACHE: Primary | ICD-10-CM

## 2023-03-14 PROCEDURE — 99204 OFFICE O/P NEW MOD 45 MIN: CPT

## 2023-03-14 RX ORDER — LORAZEPAM 0.5 MG/1
0.5 TABLET ORAL PRN
Qty: 3 TABLET | Refills: 0 | Status: SHIPPED | OUTPATIENT
Start: 2023-03-14

## 2023-03-14 RX ORDER — RIZATRIPTAN BENZOATE 10 MG/1
10 TABLET ORAL
Qty: 18 TABLET | Refills: 11 | Status: SHIPPED | OUTPATIENT
Start: 2023-03-14 | End: 2023-12-08

## 2023-03-14 ASSESSMENT — HEADACHE IMPACT TEST (HIT 6)
HOW OFTEN DID HEADACHS LIMIT CONCENTRATION ON WORK OR DAILY ACTIVITY: VERY OFTEN
WHEN YOU HAVE HEADACHES HOW OFTEN IS THE PAIN SEVERE: ALWAYS
HOW OFTEN DO HEADACHES LIMIT YOUR DAILY ACTIVITIES: ALWAYS
HOW OFTEN HAVE YOU FELT TOO TIRED TO WORK BECAUSE OF YOUR HEADACHES: VERY OFTEN
HOW OFTEN HAVE YOU FELT FED UP OR IRRITATED BECAUSE OF YOUR HEADACHES: VERY OFTEN
HIT6 TOTAL SCORE: 70
WHEN YOU HAVE A HEADACHE HOW OFTEN DO YOU WISH YOU COULD LIE DOWN: VERY OFTEN

## 2023-03-14 ASSESSMENT — PAIN SCALES - GENERAL: PAINLEVEL: NO PAIN (0)

## 2023-03-14 NOTE — PATIENT INSTRUCTIONS
GeneSight testing (genetic screen for psychiatric medications)    Botox appointment in a few weeks    Try Rizatriptan at the start of bad headaches. Can take another dose after 2 hours if needed. Do not use more than 9 days per month.     Try to limit Tylenol to 14 days per month.     You'll get a call to schedule the MRI
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
## 2023-03-14 NOTE — NURSING NOTE
"Ayesha Casas is a 32 year old female who presents for:  Chief Complaint   Patient presents with     Headache     Chronic tension-type headache, intractable, per patient, recs in epic, ref. by Mari Ford MD PhD        Initial Vitals:  /86   Pulse 82   SpO2 100%  Estimated body mass index is 32.24 kg/m  as calculated from the following:    Height as of 2/13/23: 1.448 m (4' 9\").    Weight as of 2/13/23: 67.6 kg (149 lb).. There is no height or weight on file to calculate BSA. BP completed using cuff size: regular  No Pain (0)    Nursing Comments:     Renee Steen MA  "

## 2023-04-01 ENCOUNTER — HOSPITAL ENCOUNTER (OUTPATIENT)
Dept: MRI IMAGING | Facility: CLINIC | Age: 33
Discharge: HOME | End: 2023-04-01
Payer: MEDICARE

## 2023-04-01 DIAGNOSIS — R51.9 CHRONIC DAILY HEADACHE: ICD-10-CM

## 2023-04-01 DIAGNOSIS — R29.2 ABNORMAL REFLEXES: ICD-10-CM

## 2023-04-01 DIAGNOSIS — M54.2 CHRONIC NECK PAIN: ICD-10-CM

## 2023-04-01 DIAGNOSIS — G89.29 CHRONIC NECK PAIN: ICD-10-CM

## 2023-04-01 PROCEDURE — G1010 CDSM STANSON: HCPCS

## 2023-04-01 PROCEDURE — 255N000002 HC RX 255 OP 636

## 2023-04-01 PROCEDURE — A9585 GADOBUTROL INJECTION: HCPCS

## 2023-04-01 PROCEDURE — 72156 MRI NECK SPINE W/O & W/DYE: CPT | Mod: MF

## 2023-04-01 RX ORDER — GADOBUTROL 604.72 MG/ML
7 INJECTION INTRAVENOUS ONCE
Status: COMPLETED | OUTPATIENT
Start: 2023-04-01 | End: 2023-04-01

## 2023-04-01 RX ADMIN — GADOBUTROL 7 ML: 604.72 INJECTION INTRAVENOUS at 10:58

## 2023-04-06 ENCOUNTER — OFFICE VISIT (OUTPATIENT)
Dept: NEUROLOGY | Facility: CLINIC | Age: 33
End: 2023-04-06
Payer: MEDICARE

## 2023-04-06 DIAGNOSIS — G43.709 CHRONIC MIGRAINE WITHOUT AURA WITHOUT STATUS MIGRAINOSUS, NOT INTRACTABLE: Primary | ICD-10-CM

## 2023-04-06 PROCEDURE — 64615 CHEMODENERV MUSC MIGRAINE: CPT

## 2023-04-06 NOTE — PROGRESS NOTES
[facility]  Botulinum Toxin Procedure    Nancy Babcock PA-C  Headache Neurology    April 6, 2023    Procedure: OnabotulinumtoxinA injections for chronic migraine  Indication: Chronic migraine    Ayesha Casas suffers from severe intractable headaches. She was referred by Nancy Babcock PA-C for onabotulinumtoxinA injections for headache.      Prior to initiation of botulinum toxin injections, Ayesha reported 30/30 headache days per month, with 8+/30 severe headache days per month. Her headaches are quite disabling and often interfere with her ability to function normally.    This is her first round of botulinum toxin injections.    She has attempted other migraine prophylactic treatments in the past, which have included: sertraline, fluoxetine, buspirone, nortriptyline.     She currently takes escitalopram, trazodone for mental health and insomnia.     She is not currently using other headache prophylactics.    She did try rizatriptan 10 mg tablet as prescribed at her last visit. It was helpful. She did experience some tingling sensation as a side effect. She will continue with this for now.    Patient's pain was assessed prior to the procedure. She rated her pain today as 2-3 out of 10.      The procedure was explained to the patient. Benefits of the treatment were discussed including headache and migraine reduction. Risks of the procedure were reviewed including but not limited to pain, bruising, bleeding, infection, and weakness of muscles injected or those distal to injection sites. Alternatives were discussed. The patient voiced understanding of the risks and benefits. All questions answered and patient consented to proceed.    Procedural Pause: Procedural pause was conducted to verify correct patient identity, procedure to be performed, correct side and site, correct patient position, and special requirements. Appropriate hand hygiene was utilized, and each injection site was prepped with alcohol  wipes or Chloraprep swab.     Procedure Details:   200 units of onabotulinumtoxinA was diluted in 4 mL 0.9% normal saline.   A total of 150 units of onabotulinumtoxinA were injected using 30 gauge 0.5 inch needles into the muscles listed below. 50 units of onabotulinumtoxinA were wasted.     Injection Sites: Total = 150 units onabotulinumtoxinA     Procerus muscles - 5 units into the procerus muscle (5 units total)     muscles - 5 units into the left  muscle and 5 units into the right  muscle (1 injection site per muscle) (10 units total)    Frontalis muscles - 5 units into the left superior frontalis muscle and 5 units into the right superior frontalis muscle (2 injection sites per muscle) (10 units total)    Temporalis muscles - 12.5 units into the left temporalis muscle and 12.5 units into the right temporalis muscle (2 injection sites per muscle) (25 units total)    Occipitalis muscles - 12.5 units into the left occipitalis muscle and 12.5 units into the right occipitalis muscle (2 injection sites per muscle) (25 units total)    Splenius Capitis muscles - 12.5 units into the left splenius capitis muscle and 12.5 units into the right splenius capitis muscle (2 injection sites per muscle, divided into 2/3 anteriorly and 1/3 posteriorly) (25 units total)      Trapezius muscles - 25 units into the left trapezius muscle and 25 units into the right trapezius muscle (3 injection sites per muscle, divided into 5 units, 10 units, 10 units, medial to lateral) (50 units total)      Patient tolerated the procedure well without immediate complications. She will follow up in clinic for assessment of the effectiveness of treatment. She did not report any change in her pain level after the botulinumtoxinA injection procedure.      Nancy Babcock PA-C  Headache Neurology  [facility] Neurology [facility]

## 2023-04-06 NOTE — LETTER
4/6/2023         RE: Ayesha Casas  [address]  [place] [address]        Dear Colleague,    Thank you for referring your patient, Ayesha Casas, to the [facility] NEUROLOGY CLINICS [facility]. Please see a copy of my visit note below.    [facility]  Botulinum Toxin Procedure    Nancy Babcock PA-C  Headache Neurology    April 6, 2023    Procedure: OnabotulinumtoxinA injections for chronic migraine  Indication: Chronic migraine    Ayesha Casas suffers from severe intractable headaches. She was referred by Nancy Babcock PA-C for onabotulinumtoxinA injections for headache.      Prior to initiation of botulinum toxin injections, Ayesha reported 30/30 headache days per month, with 8+/30 severe headache days per month. Her headaches are quite disabling and often interfere with her ability to function normally.    This is her first round of botulinum toxin injections.    She has attempted other migraine prophylactic treatments in the past, which have included: sertraline, fluoxetine, buspirone, nortriptyline.     She currently takes escitalopram, trazodone for mental health and insomnia.     She is not currently using other headache prophylactics.    She did try rizatriptan 10 mg tablet as prescribed at her last visit. It was helpful. She did experience some tingling sensation as a side effect. She will continue with this for now.    Patient's pain was assessed prior to the procedure. She rated her pain today as 2-3 out of 10.      The procedure was explained to the patient. Benefits of the treatment were discussed including headache and migraine reduction. Risks of the procedure were reviewed including but not limited to pain, bruising, bleeding, infection, and weakness of muscles injected or those distal to injection sites. Alternatives were discussed. The patient voiced understanding of the risks and benefits. All questions answered and patient consented to  proceed.    Procedural Pause: Procedural pause was conducted to verify correct patient identity, procedure to be performed, correct side and site, correct patient position, and special requirements. Appropriate hand hygiene was utilized, and each injection site was prepped with alcohol wipes or Chloraprep swab.     Procedure Details:   200 units of onabotulinumtoxinA was diluted in 4 mL 0.9% normal saline.   A total of 150 units of onabotulinumtoxinA were injected using 30 gauge 0.5 inch needles into the muscles listed below. 50 units of onabotulinumtoxinA were wasted.     Injection Sites: Total = 150 units onabotulinumtoxinA     Procerus muscles - 5 units into the procerus muscle (5 units total)     muscles - 5 units into the left  muscle and 5 units into the right  muscle (1 injection site per muscle) (10 units total)    Frontalis muscles - 5 units into the left superior frontalis muscle and 5 units into the right superior frontalis muscle (2 injection sites per muscle) (10 units total)    Temporalis muscles - 12.5 units into the left temporalis muscle and 12.5 units into the right temporalis muscle (2 injection sites per muscle) (25 units total)    Occipitalis muscles - 12.5 units into the left occipitalis muscle and 12.5 units into the right occipitalis muscle (2 injection sites per muscle) (25 units total)    Splenius Capitis muscles - 12.5 units into the left splenius capitis muscle and 12.5 units into the right splenius capitis muscle (2 injection sites per muscle, divided into 2/3 anteriorly and 1/3 posteriorly) (25 units total)      Trapezius muscles - 25 units into the left trapezius muscle and 25 units into the right trapezius muscle (3 injection sites per muscle, divided into 5 units, 10 units, 10 units, medial to lateral) (50 units total)      Patient tolerated the procedure well without immediate complications. She will follow up in clinic for assessment of the  effectiveness of treatment. She did not report any change in her pain level after the botulinumtoxinA injection procedure.      Raj Babcock PA-C  Headache Neurology  [facility] Neurology [facility]      Again, thank you for allowing me to participate in the care of your patient.        Sincerely,        RAJ BABCOCK PA-C

## 2023-04-12 DIAGNOSIS — E55.9 VITAMIN D DEFICIENCY: ICD-10-CM

## 2023-04-14 RX ORDER — ERGOCALCIFEROL 1.25 MG/1
CAPSULE, LIQUID FILLED ORAL
Qty: 12 CAPSULE | Refills: 1 | Status: SHIPPED | OUTPATIENT
Start: 2023-04-14 | End: 2024-08-12

## 2023-04-14 NOTE — TELEPHONE ENCOUNTER
VITAMIN D2 50,000IU (ERGO) CAP RX      Last Written Prescription Date:  2/16/23  Last Fill Quantity: 8,   # refills: 0  Last Office Visit : 2/13/23  Future Office visit:  NONE    Routing refill request to provider for review/approval because:  Not on protocol for Vit D 50,000 international unit(s) dosing

## 2023-04-17 ENCOUNTER — MYC MEDICAL ADVICE (OUTPATIENT)
Dept: NEUROLOGY | Facility: CLINIC | Age: 33
End: 2023-04-17
Payer: MEDICARE

## 2023-04-17 DIAGNOSIS — H91.90 DECREASED HEARING, UNSPECIFIED LATERALITY: Primary | ICD-10-CM

## 2023-04-17 NOTE — TELEPHONE ENCOUNTER
There have been some reported cases of decreased hearing or worsening tinnitus with Botox, which may be a temporary side effect. I can place a referral for audiology if she would like.     Nancy Babcock PA-C

## 2023-04-19 NOTE — TELEPHONE ENCOUNTER
Pt has been scheduled with Audiology.   Keila CANAS RN, BSN  [facility]

## 2023-05-12 ENCOUNTER — TELEPHONE (OUTPATIENT)
Dept: FAMILY MEDICINE | Facility: CLINIC | Age: 33
End: 2023-05-12

## 2023-05-12 ENCOUNTER — TELEPHONE (OUTPATIENT)
Dept: OBGYN | Facility: CLINIC | Age: 33
End: 2023-05-12

## 2023-05-12 ENCOUNTER — ALLIED HEALTH/NURSE VISIT (OUTPATIENT)
Dept: OBGYN | Facility: CLINIC | Age: 33
End: 2023-05-12
Payer: MEDICARE

## 2023-05-12 DIAGNOSIS — Z30.9 CONTRACEPTION MANAGEMENT: Primary | ICD-10-CM

## 2023-05-12 DIAGNOSIS — G44.221 CHRONIC TENSION-TYPE HEADACHE, INTRACTABLE: Primary | ICD-10-CM

## 2023-05-12 PROCEDURE — 99207 PR NO BILLABLE SERVICE THIS VISIT: CPT

## 2023-05-12 PROCEDURE — 250N000011 HC RX IP 250 OP 636: Performed by: FAMILY MEDICINE

## 2023-05-12 PROCEDURE — G0463 HOSPITAL OUTPT CLINIC VISIT: HCPCS

## 2023-05-12 PROCEDURE — G0463 HOSPITAL OUTPT CLINIC VISIT: HCPCS | Mod: 25

## 2023-05-12 PROCEDURE — 96372 THER/PROPH/DIAG INJ SC/IM: CPT | Performed by: FAMILY MEDICINE

## 2023-05-12 RX ORDER — ACETAMINOPHEN 500 MG
500-1000 TABLET ORAL EVERY 6 HOURS PRN
Qty: 30 TABLET | Refills: 0 | Status: SHIPPED | OUTPATIENT
Start: 2023-05-12 | End: 2023-10-25

## 2023-05-12 RX ADMIN — MEDROXYPROGESTERONE ACETATE 150 MG: 150 INJECTION, SUSPENSION INTRAMUSCULAR at 10:08

## 2023-05-12 NOTE — TELEPHONE ENCOUNTER
Tylenol w/ Codeine requires a prior auth for this patient plus with history of alcohol use disorder I do not feel comfortable filling this. I will send in Rx for Tylenol 500 mg to pharmacy for her.

## 2023-05-12 NOTE — PROGRESS NOTES
"Patient requested to speak with a nurse about pain control measures for her headaches and post-botox injection pain. According to the patient, Dr. Ford had told her she would \"need an increased dose of Tylenol for breakthrough headaches for a short period after the botox.\"      Unable to find tylenol rx in patient's chart. Informed patient I would route this request to Dr. Mejia to advise. Patient appreciative and verbalized understanding. Informed patient I would call her later today with a response.       "

## 2023-05-12 NOTE — TELEPHONE ENCOUNTER
"----- Message from Carine Gilman RN sent at 5/12/2023 11:06 AM CDT -----  Regarding: RE: Tylenol Rx request?  I believe so. The patient stated she needed a prescription for something stronger than what she can find OTC.    Thank you!  ----- Message -----  From: Capri Mejia MD  Sent: 5/12/2023  10:52 AM CDT  To: s Rn-[facility]  Subject: RE: Tylenol Rx request?                          Does she mean Tylenol with Codeine then? That's the only kind of Tylenol that is not OTC    Thanks for clarifying  Capri   ----- Message -----  From: Carine Gilman RN  Sent: 5/12/2023  10:21 AM CDT  To: Capri Mejia MD  Subject: Tylenol Rx request?                              Hi Dr. Mejia. This patient came into the clinic today and requested a tylenol rx from Dr. Ford.    Patient received botox injections for headache control on 4/6. According to the patient, Dr. Ford had told her she would \"need an increased dose of Tylenol for breakthrough headaches for a short period after the botox.\"    Unable to find tylenol rx in patient's chart. I encouraged the patient to try extra strength tylenol over the counter but the patient states this does not help her pain. Are you able to help with this? Thank you!        "

## 2023-05-12 NOTE — TELEPHONE ENCOUNTER
Called patient to notify her Dr. Mejia is sending a 500mg Tylenol prescription to her pharmacy. Patient verbalized understanding and was appreciative for the help.

## 2023-05-12 NOTE — PROGRESS NOTES
Chief Complaint   Patient presents with     Allied Health Visit     Depo-provera injection             Clinic Administered Medication Documentation      Depo Provera Documentation    Depo-Provera Standing Order inclusion/exclusion criteria reviewed.     Is this the initial or subsequent dose of Depo Provera? Subsequent dose - patient is within the acceptable window of time (11-15 weeks) for subsequent injection. Pregnancy test not indicated.    Patient meets: inclusion criteria     Is there an active order (written within the past 365 days, with administrations remaining, not ) in the chart? Yes.     Prior to injection, verified patient identity using patient's name and date of birth. Medication was administered. Please see MAR and medication order for additional information.     Vial/Syringe: Single dose vial. Was entire vial of medication used? Yes    Patient instructed to remain in clinic for 15 minutes and report any adverse reaction to staff immediately.  NEXT INJECTION DUE: 23 - 23    Verified that the patient has refills remaining in their prescription.      Nereida Sanchez LPN

## 2023-05-26 ENCOUNTER — OFFICE VISIT (OUTPATIENT)
Dept: AUDIOLOGY | Facility: CLINIC | Age: 33
End: 2023-05-26
Payer: MEDICARE

## 2023-05-26 DIAGNOSIS — H91.90 DECREASED HEARING, UNSPECIFIED LATERALITY: ICD-10-CM

## 2023-05-26 DIAGNOSIS — H93.292 ABNORMAL AUDITORY PERCEPTION OF LEFT EAR: Primary | ICD-10-CM

## 2023-05-26 PROCEDURE — 92550 TYMPANOMETRY & REFLEX THRESH: CPT | Performed by: AUDIOLOGIST

## 2023-05-26 PROCEDURE — 92557 COMPREHENSIVE HEARING TEST: CPT | Performed by: AUDIOLOGIST

## 2023-05-26 NOTE — PROGRESS NOTES
AUDIOLOGY REPORT    SUBJECTIVE:  Ayesha Casas is a 32 year old female who was seen in the Audiology Clinic at the [facility] for audiologic evaluation, referred by Nancy Babcock PA-C  The patient reports aural fullness, tinnitus and suspected hearing decline in her left ear for approximately 2 months. The patient also reports longstanding occasional dizziness. They were accompanied today by their mother.    OBJECTIVE:    Fall Risk Screen:  1. Have you fallen two or more times in the past year? No  2. Have you fallen and had an injury in the past year? No      Otoscopic exam indicates ears are clear of cerumen bilaterally     Pure Tone Thresholds assessed using conventional audiometry with good  reliability from 250-8000 Hz bilaterally using insert earphones     RIGHT:  normal hearing    LEFT:    normal hearing    Tympanogram:    RIGHT: normal eardrum mobility    LEFT:   normal eardrum mobility    Reflexes (reported by stimulus ear):  RIGHT: Ipsilateral is present at normal levels  RIGHT: Contralateral is elevated  LEFT:   Ipsilateral is present at normal levels  LEFT:   Contralateral is present at normal levels      Speech Reception Threshold:    RIGHT: 10 dB HL    LEFT:   10 dB HL  Word Recognition Score:     RIGHT: 100% at 50 dB HL using NU-6 recorded word list.    LEFT:   100% at 50 dB HL using NU-6 recorded word list.      ASSESSMENT:     ICD-10-CM    1. Abnormal auditory perception of left ear  H93.292 Cmprhn Audiometry Thrshld Eval & Speech Recog (36709)     Tymps / Reflex   (38639)      2. Decreased hearing, unspecified laterality  H91.90 Adult Audiology  Referral          Today s results were discussed with the patient in detail.     PLAN:  It is recommended that the patient be evaluated by ENT.  Please call this clinic with questions regarding these results or recommendations.        Mikel Arredondo.  Doctor of Audiology  [place] License # [id number]

## 2023-06-29 ENCOUNTER — OFFICE VISIT (OUTPATIENT)
Dept: NEUROLOGY | Facility: CLINIC | Age: 33
End: 2023-06-29
Payer: MEDICARE

## 2023-06-29 DIAGNOSIS — H91.90 DECREASED HEARING, UNSPECIFIED LATERALITY: ICD-10-CM

## 2023-06-29 DIAGNOSIS — G43.709 CHRONIC MIGRAINE WITHOUT AURA WITHOUT STATUS MIGRAINOSUS, NOT INTRACTABLE: Primary | ICD-10-CM

## 2023-06-29 PROCEDURE — 64615 CHEMODENERV MUSC MIGRAINE: CPT

## 2023-06-29 RX ORDER — NARATRIPTAN 2.5 MG/1
2.5 TABLET ORAL
Qty: 18 TABLET | Refills: 11 | Status: SHIPPED | OUTPATIENT
Start: 2023-06-29 | End: 2024-03-14

## 2023-06-29 NOTE — PROGRESS NOTES
[facility]  Botulinum Toxin Procedure    Nancy Babcock PA-C  Headache Neurology    June 29, 2023    Procedure: OnabotulinumtoxinA injections for chronic migraine  Indication: Chronic migraine    Ayesha Casas suffers from severe intractable headaches. She was referred by Nancy Babcock PA-C for onabotulinumtoxinA injections for headache.      At baseline, her headaches are a throbbing pain at bilateral temples or forehead. She has associated nausea, paresthesias of both hands, photophobia and osmophobia. Headaches last 4 hours if untreated and can reach a 10/10 in severity.     Prior to initiation of botulinum toxin injections, Ayesha reported 30/30 headache days per month, with 8+/30 severe headache days per month. Her headaches are quite disabling and often interfere with her ability to function normally.    Their last round of botulinum toxin injections was on 4/6/2023.    Ayesha reports 30/30 headache days per month currently, with 20-30/30 severe headache days per month.     Ayesha reports the following benefits of botulinum toxin injections from their last round: Has only had first round, not sure of benefit yet.     She has attempted other migraine prophylactic treatments in the past, which have included: sertraline, fluoxetine, buspirone, nortriptyline.      She currently takes escitalopram, trazodone for mental health and insomnia.      She is not currently using other headache prophylactics    Patient's pain was assessed prior to the procedure. She rated her pain today as 1-2 out of 10.      The procedure was explained to the patient. Benefits of the treatment were discussed including headache and migraine reduction. Risks of the procedure were reviewed including but not limited to pain, bruising, bleeding, infection, and weakness of muscles injected or those distal to injection sites. Alternatives were discussed. The patient voiced understanding of the risks and benefits. All questions  answered and patient consented to proceed.    Procedural Pause: Procedural pause was conducted to verify correct patient identity, procedure to be performed, correct side and site, correct patient position, and special requirements. Appropriate hand hygiene was utilized, and each injection site was prepped with alcohol wipes or Chloraprep swab.     Procedure Details:   200 units of onabotulinumtoxinA was diluted in 4 mL 0.9% normal saline.   A total of 150 units of onabotulinumtoxinA were injected using 30 gauge 0.5 inch needles into the muscles listed below. 50 units of onabotulinumtoxinA were wasted.     Injection Sites: Total = 150 units onabotulinumtoxinA     Procerus muscles - 5 units into the procerus muscle (5 units total)     muscles - 5 units into the left  muscle and 5 units into the right  muscle (1 injection site per muscle) (10 units total)    Frontalis muscles - 5 units into the left superior frontalis muscle and 5 units into the right superior frontalis muscle (2 injection sites per muscle) (10 units total)    Temporalis muscles - 12.5 units into the left temporalis muscle and 12.5 units into the right temporalis muscle (2 injection sites per muscle) (25 units total)    Occipitalis muscles - 12.5 units into the left occipitalis muscle and 12.5 units into the right occipitalis muscle (2 injection sites per muscle) (25 units total)    Splenius Capitis muscles - 12.5 units into the left splenius capitis muscle and 12.5 units into the right splenius capitis muscle (2 injection sites per muscle, divided into 2/3 anteriorly and 1/3 posteriorly) (25 units total)      Trapezius muscles - 25 units into the left trapezius muscle and 25 units into the right trapezius muscle (3 injection sites per muscle, divided into 5 units, 10 units, 10 units, medial to lateral) (50 units total)      Patient tolerated the procedure well without immediate complications. She will follow up in clinic  for assessment of the effectiveness of treatment. She did not report any change in her pain level after the botulinumtoxinA injection procedure.      Nancy Babcock PA-C  Headache Neurology  [facility] Neurology [facility]

## 2023-06-29 NOTE — LETTER
6/29/2023         RE: Ayesha Casas  [address]  [place] [address]        Dear Colleague,    Thank you for referring your patient, Ayesha Casas, to the [facility]. Please see a copy of my visit note below.    [facility]  Botulinum Toxin Procedure    Nancy Babcock PA-C  Headache Neurology    June 29, 2023    Procedure: OnabotulinumtoxinA injections for chronic migraine  Indication: Chronic migraine    Ayesha Casas suffers from severe intractable headaches. She was referred by Nancy Babcock PA-C for onabotulinumtoxinA injections for headache.      At baseline, her headaches are a throbbing pain at bilateral temples or forehead. She has associated nausea, paresthesias of both hands, photophobia and osmophobia. Headaches last 4 hours if untreated and can reach a 10/10 in severity.     Prior to initiation of botulinum toxin injections, Ayesha reported 30/30 headache days per month, with 8+/30 severe headache days per month. Her headaches are quite disabling and often interfere with her ability to function normally.    Their last round of botulinum toxin injections was on 4/6/2023.    Ayesha reports 30/30 headache days per month currently, with 20-30/30 severe headache days per month.     Ayesha reports the following benefits of botulinum toxin injections from their last round: Has only had first round, not sure of benefit yet.     She has attempted other migraine prophylactic treatments in the past, which have included: sertraline, fluoxetine, buspirone, nortriptyline.      She currently takes escitalopram, trazodone for mental health and insomnia.      She is not currently using other headache prophylactics    Patient's pain was assessed prior to the procedure. She rated her pain today as 1-2 out of 10.      The procedure was explained to the patient. Benefits of the treatment were discussed including headache and migraine reduction. Risks  of the procedure were reviewed including but not limited to pain, bruising, bleeding, infection, and weakness of muscles injected or those distal to injection sites. Alternatives were discussed. The patient voiced understanding of the risks and benefits. All questions answered and patient consented to proceed.    Procedural Pause: Procedural pause was conducted to verify correct patient identity, procedure to be performed, correct side and site, correct patient position, and special requirements. Appropriate hand hygiene was utilized, and each injection site was prepped with alcohol wipes or Chloraprep swab.     Procedure Details:   200 units of onabotulinumtoxinA was diluted in 4 mL 0.9% normal saline.   A total of 150 units of onabotulinumtoxinA were injected using 30 gauge 0.5 inch needles into the muscles listed below. 50 units of onabotulinumtoxinA were wasted.     Injection Sites: Total = 150 units onabotulinumtoxinA     Procerus muscles - 5 units into the procerus muscle (5 units total)     muscles - 5 units into the left  muscle and 5 units into the right  muscle (1 injection site per muscle) (10 units total)    Frontalis muscles - 5 units into the left superior frontalis muscle and 5 units into the right superior frontalis muscle (2 injection sites per muscle) (10 units total)    Temporalis muscles - 12.5 units into the left temporalis muscle and 12.5 units into the right temporalis muscle (2 injection sites per muscle) (25 units total)    Occipitalis muscles - 12.5 units into the left occipitalis muscle and 12.5 units into the right occipitalis muscle (2 injection sites per muscle) (25 units total)    Splenius Capitis muscles - 12.5 units into the left splenius capitis muscle and 12.5 units into the right splenius capitis muscle (2 injection sites per muscle, divided into 2/3 anteriorly and 1/3 posteriorly) (25 units total)      Trapezius muscles - 25 units into the left  trapezius muscle and 25 units into the right trapezius muscle (3 injection sites per muscle, divided into 5 units, 10 units, 10 units, medial to lateral) (50 units total)      Patient tolerated the procedure well without immediate complications. She will follow up in clinic for assessment of the effectiveness of treatment. She did not report any change in her pain level after the botulinumtoxinA injection procedure.      Raj Babcock PA-C  Headache Neurology  [facility] Neurology [facility]        Again, thank you for allowing me to participate in the care of your patient.        Sincerely,        RAJ BABCOCK PA-C

## 2023-06-30 ENCOUNTER — TELEPHONE (OUTPATIENT)
Dept: NEUROLOGY | Facility: CLINIC | Age: 33
End: 2023-06-30
Payer: MEDICARE

## 2023-06-30 NOTE — TELEPHONE ENCOUNTER
Prior Authorization Retail Medication Request    Medication/Dose: naratriptan (AMERGE) 2.5 MG tablet  ICD code (if different than what is on RX):    Previously Tried and Failed:  rizatriptan  Rationale:      Insurance Name:    Insurance ID:        Pharmacy Information (if different than what is on RX)  Name:    Phone:

## 2023-07-06 NOTE — TELEPHONE ENCOUNTER
Prior Authorization Approval    Medication: NARATRIPTAN HCL 2.5 MG PO TABS  Authorization Effective Date: 6/29/2023  Authorization Expiration Date:    Approved Dose/Quantity: 18/9ds  Reference #: [id number]   Insurance Company: [employer] - Phone [phone] Fax [phone]  Which Pharmacy is filling the prescription: [id number] - [place], [place] - [address] [facility] [place] [facility]  Pharmacy Notified: Yes  Patient Notified: Yes

## 2023-07-06 NOTE — TELEPHONE ENCOUNTER
PA Initiation    Medication: NARATRIPTAN HCL 2.5 MG PO TABS  Insurance Company: [employer] - Phone [phone] Fax [phone]  Pharmacy Filling the Rx: [id number] - [place], [place] - [address] AT [facility] [address] [place]  Filling Pharmacy Phone: [phone]  Filling Pharmacy Fax: [phone]  Start Date: 7/6/2023

## 2023-08-14 ENCOUNTER — ALLIED HEALTH/NURSE VISIT (OUTPATIENT)
Dept: OBGYN | Facility: CLINIC | Age: 33
End: 2023-08-14
Payer: MEDICARE

## 2023-08-14 DIAGNOSIS — Z30.42 ENCOUNTER FOR SURVEILLANCE OF INJECTABLE CONTRACEPTIVE: Primary | ICD-10-CM

## 2023-08-14 PROCEDURE — G0463 HOSPITAL OUTPT CLINIC VISIT: HCPCS

## 2023-08-14 PROCEDURE — 250N000011 HC RX IP 250 OP 636: Mod: JZ | Performed by: FAMILY MEDICINE

## 2023-08-14 PROCEDURE — 96372 THER/PROPH/DIAG INJ SC/IM: CPT | Performed by: FAMILY MEDICINE

## 2023-08-14 PROCEDURE — 99207 PR NO BILLABLE SERVICE THIS VISIT: CPT

## 2023-08-14 PROCEDURE — G0463 HOSPITAL OUTPT CLINIC VISIT: HCPCS | Mod: 25

## 2023-08-14 RX ADMIN — MEDROXYPROGESTERONE ACETATE 150 MG: 150 INJECTION, SUSPENSION INTRAMUSCULAR at 10:27

## 2023-08-14 NOTE — NURSING NOTE
Chief Complaint   Patient presents with    Allied Health Visit     Depo-provera injection              Clinic Administered Medication Documentation      Depo Provera Documentation    Depo-Provera Standing Order inclusion/exclusion criteria reviewed. {  Is this the initial or subsequent dose of Depo Provera? Subsequent dose - patient is within the acceptable window of time (11-15 weeks) for subsequent injection. Pregnancy test not indicated.    Patient meets: inclusion criteria     Is there an active order (written within the past 365 days, with administrations remaining, not ) in the chart? Yes.     Prior to injection, verified patient identity using patient's name and date of birth. Medication was administered. Please see MAR and medication order for additional information.     Vial/Syringe: Single dose vial. Was entire vial of medication used? Yes    Patient instructed to remain in clinic for 15 minutes and report any adverse reaction to staff immediately.  NEXT INJECTION DUE: 10/30/23 - 23    Verified that the patient has refills remaining in their prescription.         Nereida Sanchez LPN

## 2023-09-20 ENCOUNTER — TELEPHONE (OUTPATIENT)
Dept: NEUROLOGY | Facility: CLINIC | Age: 33
End: 2023-09-20
Payer: MEDICARE

## 2023-10-25 ENCOUNTER — MYC REFILL (OUTPATIENT)
Dept: NEUROLOGY | Facility: CLINIC | Age: 33
End: 2023-10-25
Payer: MEDICARE

## 2023-10-25 ENCOUNTER — MYC REFILL (OUTPATIENT)
Dept: FAMILY MEDICINE | Facility: CLINIC | Age: 33
End: 2023-10-25
Payer: MEDICARE

## 2023-10-25 DIAGNOSIS — F40.240 CLAUSTROPHOBIA: ICD-10-CM

## 2023-10-25 DIAGNOSIS — G44.221 CHRONIC TENSION-TYPE HEADACHE, INTRACTABLE: ICD-10-CM

## 2023-10-25 DIAGNOSIS — L30.9 DERMATITIS: ICD-10-CM

## 2023-10-25 DIAGNOSIS — G47.00 INSOMNIA, UNSPECIFIED TYPE: ICD-10-CM

## 2023-10-26 RX ORDER — TRIAMCINOLONE ACETONIDE 1 MG/G
CREAM TOPICAL
Qty: 30 G | Refills: 0 | Status: SHIPPED | OUTPATIENT
Start: 2023-10-26 | End: 2024-09-20

## 2023-10-26 RX ORDER — LORAZEPAM 0.5 MG/1
0.5 TABLET ORAL PRN
Qty: 3 TABLET | Refills: 0 | OUTPATIENT
Start: 2023-10-26

## 2023-10-26 RX ORDER — TRAZODONE HYDROCHLORIDE 50 MG/1
50 TABLET, FILM COATED ORAL AT BEDTIME
Qty: 30 TABLET | Refills: 0 | Status: SHIPPED | OUTPATIENT
Start: 2023-10-26 | End: 2023-12-01

## 2023-10-26 NOTE — TELEPHONE ENCOUNTER
traZODone (DESYREL) 50 MG tablet       Take 1 tablet (50 mg) by mouth at bedtime  Last Written Prescription Date:  1-2-23  Last Fill Quantity: 30,   # refills: 0  Last Office Visit : 2-13-23  Future Office visit:  none    Routing refill request to provider for review/approval because:  Gap in rf,  ? If prn  Last rx limited quantity

## 2023-10-27 RX ORDER — ACETAMINOPHEN 500 MG
500-1000 TABLET ORAL EVERY 6 HOURS PRN
Qty: 30 TABLET | Refills: 0 | Status: SHIPPED | OUTPATIENT
Start: 2023-10-27 | End: 2023-11-17

## 2023-11-02 ENCOUNTER — ALLIED HEALTH/NURSE VISIT (OUTPATIENT)
Dept: OBGYN | Facility: CLINIC | Age: 33
End: 2023-11-02
Payer: MEDICARE

## 2023-11-02 DIAGNOSIS — Z30.9 ENCOUNTER FOR BIRTH CONTROL: Primary | ICD-10-CM

## 2023-11-13 ENCOUNTER — MYC REFILL (OUTPATIENT)
Dept: FAMILY MEDICINE | Facility: CLINIC | Age: 33
End: 2023-11-13
Payer: MEDICARE

## 2023-11-13 DIAGNOSIS — J45.20 MILD INTERMITTENT ASTHMA WITHOUT COMPLICATION: ICD-10-CM

## 2023-11-14 RX ORDER — ALBUTEROL SULFATE 90 UG/1
2 AEROSOL, METERED RESPIRATORY (INHALATION) EVERY 6 HOURS PRN
Qty: 18 G | Refills: 1 | Status: SHIPPED | OUTPATIENT
Start: 2023-11-14 | End: 2024-06-10

## 2023-11-17 ENCOUNTER — OFFICE VISIT (OUTPATIENT)
Dept: FAMILY MEDICINE | Facility: CLINIC | Age: 33
End: 2023-11-17
Payer: MEDICARE

## 2023-11-17 VITALS
RESPIRATION RATE: 19 BRPM | OXYGEN SATURATION: 100 % | HEIGHT: 58 IN | WEIGHT: 151.5 LBS | HEART RATE: 109 BPM | SYSTOLIC BLOOD PRESSURE: 136 MMHG | DIASTOLIC BLOOD PRESSURE: 88 MMHG | BODY MASS INDEX: 31.8 KG/M2 | TEMPERATURE: 98.6 F

## 2023-11-17 DIAGNOSIS — R30.0 DYSURIA: ICD-10-CM

## 2023-11-17 DIAGNOSIS — G44.221 CHRONIC TENSION-TYPE HEADACHE, INTRACTABLE: ICD-10-CM

## 2023-11-17 DIAGNOSIS — J45.40 MODERATE PERSISTENT ASTHMA WITHOUT COMPLICATION: ICD-10-CM

## 2023-11-17 DIAGNOSIS — N39.0 URINARY TRACT INFECTION WITHOUT HEMATURIA, SITE UNSPECIFIED: ICD-10-CM

## 2023-11-17 DIAGNOSIS — G47.00 INSOMNIA, UNSPECIFIED TYPE: Primary | ICD-10-CM

## 2023-11-17 LAB
ALBUMIN UR-MCNC: ABNORMAL MG/DL
APPEARANCE UR: CLEAR
BACTERIA #/AREA URNS HPF: ABNORMAL /HPF
BILIRUB UR QL STRIP: NEGATIVE
COLOR UR AUTO: YELLOW
GLUCOSE UR STRIP-MCNC: NEGATIVE MG/DL
HGB UR QL STRIP: ABNORMAL
KETONES UR STRIP-MCNC: ABNORMAL MG/DL
LEUKOCYTE ESTERASE UR QL STRIP: NEGATIVE
NITRATE UR QL: POSITIVE
PH UR STRIP: 6.5 [PH] (ref 5–7)
RBC #/AREA URNS AUTO: ABNORMAL /HPF
SP GR UR STRIP: 1.02 (ref 1–1.03)
UROBILINOGEN UR STRIP-ACNC: 0.2 E.U./DL
WBC #/AREA URNS AUTO: ABNORMAL /HPF

## 2023-11-17 PROCEDURE — 87186 SC STD MICRODIL/AGAR DIL: CPT

## 2023-11-17 PROCEDURE — 81001 URINALYSIS AUTO W/SCOPE: CPT

## 2023-11-17 PROCEDURE — 99214 OFFICE O/P EST MOD 30 MIN: CPT

## 2023-11-17 PROCEDURE — 87086 URINE CULTURE/COLONY COUNT: CPT

## 2023-11-17 RX ORDER — ALBUTEROL SULFATE 90 UG/1
2 AEROSOL, METERED RESPIRATORY (INHALATION) EVERY 6 HOURS
Qty: 6.7 G | Refills: 0 | Status: SHIPPED | OUTPATIENT
Start: 2023-11-17

## 2023-11-17 RX ORDER — ACETAMINOPHEN 500 MG
500-1000 TABLET ORAL EVERY 6 HOURS PRN
Qty: 30 TABLET | Refills: 0 | Status: SHIPPED | OUTPATIENT
Start: 2023-11-17 | End: 2024-06-10

## 2023-11-17 RX ORDER — SULFAMETHOXAZOLE/TRIMETHOPRIM 800-160 MG
1 TABLET ORAL 2 TIMES DAILY
Qty: 10 TABLET | Refills: 0 | Status: SHIPPED | OUTPATIENT
Start: 2023-11-17 | End: 2023-11-22

## 2023-11-17 ASSESSMENT — PATIENT HEALTH QUESTIONNAIRE - PHQ9
SUM OF ALL RESPONSES TO PHQ QUESTIONS 1-9: 9
SUM OF ALL RESPONSES TO PHQ QUESTIONS 1-9: 9
10. IF YOU CHECKED OFF ANY PROBLEMS, HOW DIFFICULT HAVE THESE PROBLEMS MADE IT FOR YOU TO DO YOUR WORK, TAKE CARE OF THINGS AT HOME, OR GET ALONG WITH OTHER PEOPLE: NOT DIFFICULT AT ALL

## 2023-11-17 ASSESSMENT — ASTHMA QUESTIONNAIRES: ACT_TOTALSCORE: 13

## 2023-11-17 ASSESSMENT — PAIN SCALES - GENERAL: PAINLEVEL: EXTREME PAIN (8)

## 2023-11-17 NOTE — PROGRESS NOTES
Assessment & Plan     Moderate persistent asthma without complication  Chronic, worsening  - albuterol (PROAIR HFA/PROVENTIL HFA/VENTOLIN HFA) 108 (90 Base) MCG/ACT inhaler; Inhale 2 puffs into the lungs every 6 hours  Patient has been having a flareup of asthma with nighttime awakenings every 3 days.  Albuterol as previously been helpful.  We talked about the possibility of doing an inhaled corticosteroid mixed with LABA, but patient had expressed preference for returning to her normal albuterol inhaler.  We discussed setting a goal of using it for less than 2 days/week.  Advised following up with PCP related to whether further asthma medication is needed.    Insomnia, unspecified type  - Adult Sleep Eval & Management  Referral; Future    Chronic tension-type headache, intractable  - acetaminophen (TYLENOL) 500 MG tablet; Take 1-2 tablets (500-1,000 mg) by mouth every 6 hours as needed for fever or pain    Dysuria  - UA with Microscopic reflex to Culture - lab collect; Future  - Urine Culture    Urinary tract infection without hematuria, site unspecified  - sulfamethoxazole-trimethoprim (BACTRIM DS) 800-160 MG tablet; Take 1 tablet by mouth 2 times daily for 5 days    Urinalysis shows presence of UTI.  Chart review shows patient previously responded well to Bactrim.  We will prescribe a 5-day course of her.  Patient has issues with chronic insomnia and headaches.  I did refill her Tylenol, and she had requested a sleep study for further evaluation for insomnia.  We discussed possibility of sleep apnea contributing to elevated blood pressure and daytime drowsiness.  Patient also has mild intermittent    Nahum Tran NP  [facility]    Juan Miguel Hodge is a 33 year old, presenting for the following health issues:  Insomnia  Asthma: completely out of albuterol. Shortness of breath at night. Needing to drink water at night. Coughing in middle of the night. Nighttime awakenings  "every 3 days with mucous. Since moving, symptoms have been ramping up in August    Neck pain: left sided neck pain everyday. Left side of neck on trapezius. Patient had fallen in a establishment and hit her head. Back pain that radiates to the neck.  Thinks she has a UTI: very strong odor when voiding that started on Monday. Trying to flush it out with water. No fevers.   UTI, NEED INCHALER, and REFERRAL FOR SLEEP STUDY        11/17/2023     9:22 AM   Additional Questions   Roomed by KARI   Accompanied by SELF       History of Present Illness     Asthma:  She presents for follow up of asthma.  She has no cough, some wheezing, and some shortness of breath.  She is using a relief medication 2-3 times per day. She does not miss any doses of her controller medication throughout the week. Patient is aware of the following triggers: animal dander, cold air, dust mites, humidity, mold and pollens. The patient has not had a visit to the Emergency Room, Urgent Care or Hospital due to asthma since the last clinic visit.     Back Pain:  She presents for follow up of back pain. Patient's back pain is a recurring problem.  Location of back pain:  Right middle of back  Description of back pain: dull ache  Back pain spreads: left side of neck    Since patient first noticed back pain, pain is: gradually worsening  Does back pain interfere with her job:  No       Headaches:   Since the patient's last clinic visit, headaches are: worsened  The patient is getting headaches:  Everyday  She is not able to do normal daily activities when she has a migraine.  The patient is taking the following rescue/relief medications:  Tylenol   Patient states \"I get some relief\" from the rescue/relief medications.   The patient is taking the following medications to prevent migraines:  Other  In the past 4 weeks, the patient has gone to an Urgent Care or Emergency Room 2 times times due to headaches.    She eats 0-1 servings of fruits and vegetables " "daily.She consumes 1 sweetened beverage(s) daily.She exercises with enough effort to increase her heart rate 9 or less minutes per day.  She exercises with enough effort to increase her heart rate 3 or less days per week. She is missing 2 dose(s) of medications per week.         Review of Systems   Constitutional, HEENT, cardiovascular, pulmonary, gi and gu systems are negative, except as otherwise noted.      Objective    /88   Pulse 109   Temp 98.6  F (37  C) (Temporal)   Resp 19   Ht 1.47 m (4' 9.87\")   Wt 68.7 kg (151 lb 8 oz)   SpO2 100%   BMI 31.80 kg/m    Body mass index is 31.8 kg/m .  Physical Exam   GENERAL: healthy, alert and no distress  RESP: lungs clear to auscultation - no rales, rhonchi or wheezes  CV: regular rate and rhythm, normal S1 S2, no S3 or S4, no murmur, click or rub  ABDOMEN: soft, nontender, no hepatosplenomegaly, no masses and bowel sounds normal  BACK: Left paravertebral tenderness to palpation.  PSYCH: mentation appears normal, affect normal/bright              "

## 2023-11-18 LAB — BACTERIA UR CULT: ABNORMAL

## 2023-11-28 DIAGNOSIS — G47.00 INSOMNIA, UNSPECIFIED TYPE: ICD-10-CM

## 2023-12-01 RX ORDER — TRAZODONE HYDROCHLORIDE 50 MG/1
50 TABLET, FILM COATED ORAL AT BEDTIME
Qty: 30 TABLET | Refills: 0 | Status: SHIPPED | OUTPATIENT
Start: 2023-12-01 | End: 2024-01-05

## 2023-12-08 ENCOUNTER — OFFICE VISIT (OUTPATIENT)
Dept: NEUROLOGY | Facility: CLINIC | Age: 33
End: 2023-12-08
Payer: MEDICARE

## 2023-12-08 DIAGNOSIS — G43.709 CHRONIC MIGRAINE WITHOUT AURA WITHOUT STATUS MIGRAINOSUS, NOT INTRACTABLE: ICD-10-CM

## 2023-12-08 PROCEDURE — 64615 CHEMODENERV MUSC MIGRAINE: CPT

## 2023-12-08 NOTE — LETTER
12/8/2023         RE: Ayesha Casas  [address]  [place] [address]        Dear Colleague,    Thank you for referring your patient, Ayesha Casas, to the [facility] NEUROLOGY CLINICS [facility]. Please see a copy of my visit note below.    [facility]  Botulinum Toxin Procedure    Nancy Babcock PA-C  Headache Neurology    December 8, 2023    Procedure: OnabotulinumtoxinA injections for chronic migraine  Indication: Chronic migraine    Ayesha Csaas suffers from severe intractable headaches. She was referred by Nancy Babcock PA-C for onabotulinumtoxinA injections for headache.       At baseline, her headaches are a throbbing pain at bilateral temples or forehead. She has associated nausea, paresthesias of both hands, photophobia and osmophobia. Headaches last 4 hours if untreated and can reach a 10/10 in severity.      Prior to initiation of botulinum toxin injections, Ayesha reported 30/30 headache days per month, with 8+/30 severe headache days per month. Her headaches are quite disabling and often interfere with her ability to function normally.    Their last round of injections was on 6/29/2023.     She missed September injections. She recalls having 8-12/30 headaches per month when Botox was active.     Ayesha reports the following benefits of botulinum toxin injections from their last round: She was able to have headache-free days and headaches were not as severe. Headaches do not exceed 6/10 in severity now, previously would reach 10/10.     She has attempted other migraine prophylactic treatments in the past, which have included: sertraline, fluoxetine, buspirone, nortriptyline.      She currently takes escitalopram, trazodone for mental health and insomnia.      She is not currently using other headache prophylactics       Patient's pain was assessed prior to the procedure. She rated her pain today as 5 out of 10.      The procedure was explained to the patient.  Benefits of the treatment were discussed including headache and migraine reduction. Risks of the procedure were reviewed including but not limited to pain, bruising, bleeding, infection, and weakness of muscles injected or those distal to injection sites. Alternatives were discussed. The patient voiced understanding of the risks and benefits. All questions answered and patient consented to proceed.    Procedural Pause: Procedural pause was conducted to verify correct patient identity, procedure to be performed, correct side and site, correct patient position, and special requirements. Appropriate hand hygiene was utilized, and each injection site was prepped with alcohol wipes or Chloraprep swab.     Procedure Details:   200 units of onabotulinumtoxinA was diluted in 4 mL 0.9% normal saline.   A total of 150 units of onabotulinumtoxinA were injected using 30 gauge 0.5 inch needles into the muscles listed below. 50 units of onabotulinumtoxinA were wasted.     Injection Sites: Total = 150 units onabotulinumtoxinA     Procerus muscles - 5 units into the procerus muscle (5 units total)     muscles - 5 units into the left  muscle and 5 units into the right  muscle (1 injection site per muscle) (10 units total)    Frontalis muscles - 5 units into the left superior frontalis muscle and 5 units into the right superior frontalis muscle (2 injection sites per muscle) (10 units total)    Temporalis muscles - 12.5 units into the left temporalis muscle and 12.5 units into the right temporalis muscle (2 injection sites per muscle) (25 units total)    Occipitalis muscles - 12.5 units into the left occipitalis muscle and 12.5 units into the right occipitalis muscle (2 injection sites per muscle) (25 units total)    Splenius Capitis muscles - 12.5 units into the left splenius capitis muscle and 12.5 units into the right splenius capitis muscle (2 injection sites per muscle, divided into 2/3 anteriorly  and 1/3 posteriorly) (25 units total)      Trapezius muscles - 25 units into the left trapezius muscle and 25 units into the right trapezius muscle (3 injection sites per muscle, divided into 5 units, 10 units, 10 units, medial to lateral) (50 units total)      Patient tolerated the procedure well without immediate complications. She will follow up in clinic for assessment of the effectiveness of treatment. She did not report any change in her pain level after the botulinumtoxinA injection procedure.      Raj Babcock PA-C  Headache Neurology  [facility] Neurology [facility]      Again, thank you for allowing me to participate in the care of your patient.        Sincerely,        RAJ BABCOCK PA-C

## 2023-12-08 NOTE — PROGRESS NOTES
[facility]  Botulinum Toxin Procedure    Nancy Babcock PA-C  Headache Neurology    December 8, 2023    Procedure: OnabotulinumtoxinA injections for chronic migraine  Indication: Chronic migraine    Ayesha Casas suffers from severe intractable headaches. She was referred by Nancy Babcock PA-C for onabotulinumtoxinA injections for headache.       At baseline, her headaches are a throbbing pain at bilateral temples or forehead. She has associated nausea, paresthesias of both hands, photophobia and osmophobia. Headaches last 4 hours if untreated and can reach a 10/10 in severity.      Prior to initiation of botulinum toxin injections, Ayesha reported 30/30 headache days per month, with 8+/30 severe headache days per month. Her headaches are quite disabling and often interfere with her ability to function normally.    Their last round of injections was on 6/29/2023.     She missed September injections. She recalls having 8-12/30 headaches per month when Botox was active.     Ayesha reports the following benefits of botulinum toxin injections from their last round: She was able to have headache-free days and headaches were not as severe. Headaches do not exceed 6/10 in severity now, previously would reach 10/10.     She has attempted other migraine prophylactic treatments in the past, which have included: sertraline, fluoxetine, buspirone, nortriptyline.      She currently takes escitalopram, trazodone for mental health and insomnia.      She is not currently using other headache prophylactics       Patient's pain was assessed prior to the procedure. She rated her pain today as 5 out of 10.      The procedure was explained to the patient. Benefits of the treatment were discussed including headache and migraine reduction. Risks of the procedure were reviewed including but not limited to pain, bruising, bleeding, infection, and weakness of muscles injected or those distal to injection sites. Alternatives were  discussed. The patient voiced understanding of the risks and benefits. All questions answered and patient consented to proceed.    Procedural Pause: Procedural pause was conducted to verify correct patient identity, procedure to be performed, correct side and site, correct patient position, and special requirements. Appropriate hand hygiene was utilized, and each injection site was prepped with alcohol wipes or Chloraprep swab.     Procedure Details:   200 units of onabotulinumtoxinA was diluted in 4 mL 0.9% normal saline.   A total of 150 units of onabotulinumtoxinA were injected using 30 gauge 0.5 inch needles into the muscles listed below. 50 units of onabotulinumtoxinA were wasted.     Injection Sites: Total = 150 units onabotulinumtoxinA     Procerus muscles - 5 units into the procerus muscle (5 units total)     muscles - 5 units into the left  muscle and 5 units into the right  muscle (1 injection site per muscle) (10 units total)    Frontalis muscles - 5 units into the left superior frontalis muscle and 5 units into the right superior frontalis muscle (2 injection sites per muscle) (10 units total)    Temporalis muscles - 12.5 units into the left temporalis muscle and 12.5 units into the right temporalis muscle (2 injection sites per muscle) (25 units total)    Occipitalis muscles - 12.5 units into the left occipitalis muscle and 12.5 units into the right occipitalis muscle (2 injection sites per muscle) (25 units total)    Splenius Capitis muscles - 12.5 units into the left splenius capitis muscle and 12.5 units into the right splenius capitis muscle (2 injection sites per muscle, divided into 2/3 anteriorly and 1/3 posteriorly) (25 units total)      Trapezius muscles - 25 units into the left trapezius muscle and 25 units into the right trapezius muscle (3 injection sites per muscle, divided into 5 units, 10 units, 10 units, medial to lateral) (50 units total)      Patient  tolerated the procedure well without immediate complications. She will follow up in clinic for assessment of the effectiveness of treatment. She did not report any change in her pain level after the botulinumtoxinA injection procedure.      Nancy Babcock PA-C  Headache Neurology  [facility] Neurology [facility]

## 2023-12-29 DIAGNOSIS — G47.00 INSOMNIA, UNSPECIFIED TYPE: ICD-10-CM

## 2024-01-05 RX ORDER — TRAZODONE HYDROCHLORIDE 50 MG/1
50 TABLET, FILM COATED ORAL AT BEDTIME
Qty: 30 TABLET | Refills: 1 | Status: SHIPPED | OUTPATIENT
Start: 2024-01-05 | End: 2024-06-10

## 2024-01-05 NOTE — TELEPHONE ENCOUNTER
TRAZODONE 50MG TABLETS      Last Written Prescription Date:  12/1/23  Last Fill Quantity: 30,   # refills: 0  Last Office Visit : 2/13/23  Future Office visit:  none    Routing refill request to provider for review/approval because:  Limited refills with last order  Please clarify

## 2024-02-04 ENCOUNTER — HEALTH MAINTENANCE LETTER (OUTPATIENT)
Age: 34
End: 2024-02-04

## 2024-02-11 DIAGNOSIS — G43.709 CHRONIC MIGRAINE WITHOUT AURA WITHOUT STATUS MIGRAINOSUS, NOT INTRACTABLE: Primary | ICD-10-CM

## 2024-02-15 ENCOUNTER — ALLIED HEALTH/NURSE VISIT (OUTPATIENT)
Dept: OBGYN | Facility: CLINIC | Age: 34
End: 2024-02-15
Payer: MEDICARE

## 2024-02-15 DIAGNOSIS — Z30.9 CONTRACEPTION MANAGEMENT: Primary | ICD-10-CM

## 2024-02-15 DIAGNOSIS — Z30.42 ENCOUNTER FOR SURVEILLANCE OF INJECTABLE CONTRACEPTIVE: Primary | ICD-10-CM

## 2024-02-15 PROCEDURE — 250N000011 HC RX IP 250 OP 636: Mod: JZ | Performed by: FAMILY MEDICINE

## 2024-02-15 PROCEDURE — 99207 PR NO BILLABLE SERVICE THIS VISIT: CPT

## 2024-02-15 PROCEDURE — 96372 THER/PROPH/DIAG INJ SC/IM: CPT | Performed by: FAMILY MEDICINE

## 2024-02-15 PROCEDURE — G0463 HOSPITAL OUTPT CLINIC VISIT: HCPCS | Mod: 25

## 2024-02-15 PROCEDURE — G0463 HOSPITAL OUTPT CLINIC VISIT: HCPCS

## 2024-02-15 RX ORDER — MEDROXYPROGESTERONE ACETATE 150 MG/ML
150 INJECTION, SUSPENSION INTRAMUSCULAR
Status: ACTIVE | OUTPATIENT
Start: 2024-02-15 | End: 2025-02-09

## 2024-02-15 RX ADMIN — MEDROXYPROGESTERONE ACETATE 150 MG: 150 INJECTION, SUSPENSION INTRAMUSCULAR at 11:08

## 2024-02-15 NOTE — PROGRESS NOTES
Chief Complaint   Patient presents with    Allied Health Visit     Depo provera injection          Clinic Administered Medication Documentation        Patient was given Depo Provera. Prior to medication administration, verified patient's identity using patient s name and date of birth. Please see MAR and medication order for additional information. Patient instructed to remain in clinic for 15 minutes and report any adverse reaction to staff immediately.    Vial/Syringe: Single dose vial. Was entire vial of medication used? Yes    NEXT INJECTION DUE: 5/2/24 - 5/30/24   Nereida Sanchez LPN

## 2024-02-20 DIAGNOSIS — G47.00 INSOMNIA, UNSPECIFIED TYPE: ICD-10-CM

## 2024-02-23 NOTE — TELEPHONE ENCOUNTER
traZODone (DESYREL) 50 MG tablet 30 tablet 1 1/5/2024  ----------------------  Last Office Visit : 2/13/2023  [facility]  Mari Ford MD PhD     Future Office visit:  0  ----------------------      Routing refill request to provider for review/approval because:  Overdue for follow-up appt, none scheduled. 30 day mylene refill pended        Pass/Fail Protocol Criteria:    Seroton[employer] Mod[employer] [id number]/23/2024 04:31 PM   Protocol Details Recent (12 mo) or future (30 days) visit within the authorizing provider's specialty    Medication is active on med list    Patient is age 18 or older    No active pregnancy on record    No positive pregnancy test in past 12 months

## 2024-03-11 ENCOUNTER — TELEPHONE (OUTPATIENT)
Dept: FAMILY MEDICINE | Facility: CLINIC | Age: 34
End: 2024-03-11
Payer: MEDICARE

## 2024-03-14 ENCOUNTER — OFFICE VISIT (OUTPATIENT)
Dept: NEUROLOGY | Facility: CLINIC | Age: 34
End: 2024-03-14
Payer: MEDICARE

## 2024-03-14 DIAGNOSIS — G43.709 CHRONIC MIGRAINE WITHOUT AURA WITHOUT STATUS MIGRAINOSUS, NOT INTRACTABLE: Primary | ICD-10-CM

## 2024-03-14 PROCEDURE — 64615 CHEMODENERV MUSC MIGRAINE: CPT | Performed by: PSYCHIATRY & NEUROLOGY

## 2024-03-14 RX ORDER — ZOLMITRIPTAN 5 MG/1
5 TABLET, FILM COATED ORAL
Qty: 18 TABLET | Refills: 3 | Status: SHIPPED | OUTPATIENT
Start: 2024-03-14 | End: 2024-08-12

## 2024-03-14 RX ORDER — AMLODIPINE BESYLATE 5 MG/1
5 TABLET ORAL DAILY
COMMUNITY
Start: 2024-01-10 | End: 2024-08-12

## 2024-03-14 NOTE — LETTER
3/14/2024         RE: Ayesha Casas  [address]  [place] [address]        Dear Colleague,    Thank you for referring your patient, Ayesha Casas, to the [facility] NEUROLOGY CLINICS [facility]. Please see a copy of my visit note below.    [facility]  Botulinum Toxin Procedure    Mela Vallejo MD  Headache Neurology    March 14, 2024    Procedure:  OnabotulinumtoxinA injections for chronic migraine  Indication:  Chronic migraine    Ayesha Casas suffers from severe intractable headaches. She was referred by Nancy Babcock PA-C for onabotulinumtoxinA injections for headache.       At baseline, her headaches are a throbbing pain at bilateral temples or forehead. She has associated nausea, paresthesias of both hands, photophobia and osmophobia. Headaches last 4 hours if untreated and can reach a 10/10 in severity.      Prior to initiation of botulinum toxin injections, Ayesha reported 30/30 headache days per month, with 8+/30 severe headache days per month. Her headaches are quite disabling and often interfere with her ability to function normally.    Date of last injections: 12/08/2023    Ms. Casas reports 16-20 headache days per month currently, with 1 severe headache days per month.  She has noticed a wearing off phenomenon prior to this round of botulinum toxin injections, lasting 4 weeks.    Botulinum toxin injections have improved their functioning. She has been able to get up and get out of bed after starting these injections and this is equivalent to about 7 headache days per month.    She has attempted other migraine prophylactic treatments in the past, which have included: sertraline, fluoxetine, buspirone, nortriptyline.      She currently takes escitalopram, trazodone for mental health and insomnia.    Ms. Bergerons pain was assessed prior to the procedure.  She rated her pain today as 0 out of 10.    Procedural Pause: Procedural pause was conducted to  verify correct patient identity, procedure to be performed, correct side and site, correct patient position, and special requirements. Appropriate hand hygiene was utilized, and each injection site was prepped with alcohol wipe or Chloraprep swab.     Procedure Details: 200 units of onabotulinumtoxinA was diluted in 4 mL 0.9% normal saline. A total of 150 units of onabotulinumtoxinA were injected using 30 gauge 0.5 in needles into the muscles listed below. 50 units of onabotulinumtoxinA were wasted.     Injection Sites: Total = 150 units onabotulinumtoxinA      and Procerus muscles - 5 units into the left and right corrugators and 5 units into the procerus (15 units total)    Frontalis muscles - 5 units into the left superior frontalis and 5 units into the right superior frontalis (2 injection sites per muscle) (10 units total)    Temporalis muscles - 12.5 units into the left temporalis muscle and 12.5 units into the right temporalis muscle (2 injection sites per muscle) (25 units total)    Occipitalis muscles - 12.5 units into the left occipitalis muscle and 12.5 units into the right occipitalis muscle (2 injection sites per muscle) (25 units total)    Splenius Capitis muscles - 12.5 units into the left splenius capitis muscle and 12.5 units into the right splenius capitis muscle (2 injection sites per muscle, divided into 2/3 anteriorly and 1/3 posteriorly) (25 units total)      Trapezius muscles - 25 units into the left trapezius muscle and 25 units into the right trapezius muscle (3 injection sites per muscle, divided 5 units, 10 units, 10 units, medial to lateral) (50 units total)    Ms. Casas tolerated the procedure well without immediate complications.  She will follow up in clinic for assessment of the effectiveness of treatment.  She did not report any change in her pain level after the botulinumtoxinA injection procedure.    Addendum: Discussed checking blood pressures at the time she has no  or decreased headaches to ensure that it is not pain driven increases in blood pressure or an ambulatory BP monitoring with a diary could be utilized for evaluation as well. Regarding Naratriptan, she is no longer on amlodipine and BP is well controlled currently. She requested an alternate due to feeling the flushed sensation. We will start Zolmitriptan 5mg today.       Mela Vallejo MD  Headache Neurology  [facility]       Again, thank you for allowing me to participate in the care of your patient.        Sincerely,        Mela Vallejo MD

## 2024-03-14 NOTE — PROGRESS NOTES
[facility]  Botulinum Toxin Procedure    Mela Vallejo MD  Headache Neurology    March 14, 2024    Procedure:  OnabotulinumtoxinA injections for chronic migraine  Indication:  Chronic migraine    Ayesha Casas suffers from severe intractable headaches. She was referred by Nancy Babcock PA-C for onabotulinumtoxinA injections for headache.       At baseline, her headaches are a throbbing pain at bilateral temples or forehead. She has associated nausea, paresthesias of both hands, photophobia and osmophobia. Headaches last 4 hours if untreated and can reach a 10/10 in severity.      Prior to initiation of botulinum toxin injections, Ayesha reported 30/30 headache days per month, with 8+/30 severe headache days per month. Her headaches are quite disabling and often interfere with her ability to function normally.    Date of last injections: 12/08/2023    Ms. Casas reports 16-20 headache days per month currently, with 1 severe headache days per month.  She has noticed a wearing off phenomenon prior to this round of botulinum toxin injections, lasting 4 weeks.    Botulinum toxin injections have improved their functioning. She has been able to get up and get out of bed after starting these injections and this is equivalent to about 7 headache days per month.    She has attempted other migraine prophylactic treatments in the past, which have included: sertraline, fluoxetine, buspirone, nortriptyline.      She currently takes escitalopram, trazodone for mental health and insomnia.    Ms. Bergerons pain was assessed prior to the procedure.  She rated her pain today as 0 out of 10.    Procedural Pause: Procedural pause was conducted to verify correct patient identity, procedure to be performed, correct side and site, correct patient position, and special requirements. Appropriate hand hygiene was utilized, and each injection site was prepped with alcohol wipe or Chloraprep swab.     Procedure Details:  200 units of onabotulinumtoxinA was diluted in 4 mL 0.9% normal saline. A total of 150 units of onabotulinumtoxinA were injected using 30 gauge 0.5 in needles into the muscles listed below. 50 units of onabotulinumtoxinA were wasted.     Injection Sites: Total = 150 units onabotulinumtoxinA      and Procerus muscles - 5 units into the left and right corrugators and 5 units into the procerus (15 units total)    Frontalis muscles - 5 units into the left superior frontalis and 5 units into the right superior frontalis (2 injection sites per muscle) (10 units total)    Temporalis muscles - 12.5 units into the left temporalis muscle and 12.5 units into the right temporalis muscle (2 injection sites per muscle) (25 units total)    Occipitalis muscles - 12.5 units into the left occipitalis muscle and 12.5 units into the right occipitalis muscle (2 injection sites per muscle) (25 units total)    Splenius Capitis muscles - 12.5 units into the left splenius capitis muscle and 12.5 units into the right splenius capitis muscle (2 injection sites per muscle, divided into 2/3 anteriorly and 1/3 posteriorly) (25 units total)      Trapezius muscles - 25 units into the left trapezius muscle and 25 units into the right trapezius muscle (3 injection sites per muscle, divided 5 units, 10 units, 10 units, medial to lateral) (50 units total)    Ms. Casas tolerated the procedure well without immediate complications.  She will follow up in clinic for assessment of the effectiveness of treatment.  She did not report any change in her pain level after the botulinumtoxinA injection procedure.    Addendum: Discussed checking blood pressures at the time she has no or decreased headaches to ensure that it is not pain driven increases in blood pressure or an ambulatory BP monitoring with a diary could be utilized for evaluation as well. Regarding Naratriptan, she is no longer on amlodipine and BP is well controlled currently. She  requested an alternate due to feeling the flushed sensation. We will start Zolmitriptan 5mg today.       Mela Vallejo MD  Headache Neurology  [facility]

## 2024-03-20 ENCOUNTER — OFFICE VISIT (OUTPATIENT)
Dept: SLEEP MEDICINE | Facility: CLINIC | Age: 34
End: 2024-03-20
Payer: MEDICARE

## 2024-03-20 DIAGNOSIS — G47.33 OBSTRUCTIVE SLEEP APNEA: Primary | ICD-10-CM

## 2024-03-20 DIAGNOSIS — G47.00 INSOMNIA, UNSPECIFIED TYPE: ICD-10-CM

## 2024-03-20 PROCEDURE — 99204 OFFICE O/P NEW MOD 45 MIN: CPT | Performed by: STUDENT IN AN ORGANIZED HEALTH CARE EDUCATION/TRAINING PROGRAM

## 2024-03-20 RX ORDER — ZOLPIDEM TARTRATE 5 MG/1
TABLET ORAL
Qty: 1 TABLET | Refills: 0 | Status: SHIPPED | OUTPATIENT
Start: 2024-03-20

## 2024-03-20 ASSESSMENT — SLEEP AND FATIGUE QUESTIONNAIRES
HOW LIKELY ARE YOU TO NOD OFF OR FALL ASLEEP WHEN YOU ARE A PASSENGER IN A CAR FOR AN HOUR WITHOUT A BREAK: SLIGHT CHANCE OF DOZING
HOW LIKELY ARE YOU TO NOD OFF OR FALL ASLEEP WHILE SITTING AND TALKING TO SOMEONE: WOULD NEVER DOZE
HOW LIKELY ARE YOU TO NOD OFF OR FALL ASLEEP WHILE LYING DOWN TO REST IN THE AFTERNOON WHEN CIRCUMSTANCES PERMIT: SLIGHT CHANCE OF DOZING
HOW LIKELY ARE YOU TO NOD OFF OR FALL ASLEEP WHILE WATCHING TV: MODERATE CHANCE OF DOZING
HOW LIKELY ARE YOU TO NOD OFF OR FALL ASLEEP IN A CAR, WHILE STOPPED FOR A FEW MINUTES IN TRAFFIC: WOULD NEVER DOZE
HOW LIKELY ARE YOU TO NOD OFF OR FALL ASLEEP WHILE SITTING AND READING: MODERATE CHANCE OF DOZING
HOW LIKELY ARE YOU TO NOD OFF OR FALL ASLEEP WHILE SITTING QUIETLY AFTER LUNCH WITHOUT ALCOHOL: WOULD NEVER DOZE
HOW LIKELY ARE YOU TO NOD OFF OR FALL ASLEEP WHILE SITTING INACTIVE IN A PUBLIC PLACE: MODERATE CHANCE OF DOZING

## 2024-03-20 NOTE — PATIENT INSTRUCTIONS
"MY INFORMATION ON SLEEP APNEA-  Ayesha Casas    DOCTOR : Will Dodson MD  SLEEP CENTER :      MY CONTACT NUMBER:    [facility]   [phone]  [facility]    Am I having a home sleep study?  Watch the video for the device you are using:  /drop off device, Noxturnal T-3: [email or web address]        Frequently asked questions:  1. What is Obstructive Sleep Apnea (LYNNETTE)? LYNNETTE is the most common type of sleep apnea. Apnea means, \"without breath.\"  Apnea is most often caused by narrowing or collapse of the upper airway as muscles relax during sleep.   Almost everyone has occasional apneas. Most people with sleep apnea have had brief interruptions at night frequently for many years.  The severity of sleep apnea is related to how frequent and severe the events are.   Apneas are any events where there is no breathing.  Hypopneas are any events where there is shallow breathing. Sleep studies will track and then add all of the apneas and hypopneas into a total and then divided this number by the total time asleep to obtain the apnea hypopnea index(AHI). 0-5 events/per hour = No Sleep Apnea; 5-15 events/per hour = Mild Sleep Apnea; 15-30 events/per hour = Moderate Sleep Apnea; Greater than 30 events/per hour = Severe Sleep Apnea.  Sleep apnea is typically worse during REM sleep due to complete muscle relaxation, including the muscles of the airway. Sleep apnea is also typically worse during supine(sleeping on your back) sleep due to internal structures more easily falling on the top of the airway and external pressures more easily crushing the airway.  The respiratory disturbance index(RDI) includes apneas, hypopneas, and other respiratory events such as snoring that may disturb your sleep.  2. What are the consequences of LYNNETTE? Symptoms include: feeling sleepy during the day, snoring loudly, gasping or stopping of breathing, trouble " sleeping, and occasionally morning headaches or heartburn at night.  Sleepiness can be serious and even increase the risk of falling asleep while driving. Other health consequences may include development of high blood pressure and other cardiovascular disease in persons who are susceptible. Untreated LYNNETTE  can contribute to heart disease, stroke and diabetes.   3. What are the treatment options? In most situations, sleep apnea is a lifelong disease that must be managed with daily therapy. Medications are not effective for sleep apnea and surgery is generally not considered until other therapies have been tried. Your treatment is your choice . Continuous Positive Airway (CPAP) works right away and is the therapy that is effective in nearly everyone. An oral device to hold your jaw forward is usually the next most reliable option. Other options include postioning devices (to keep you off your back), weight loss, and surgery including a tongue pacing device. There is more detail about some of these options below.    Important tips for using CPAP and similar devices   Know your equipment:  CPAP is continuous positive airway pressure that prevents obstructive sleep apnea by keeping the throat from collapsing while you are sleeping. In most cases, the device is  smart  and can slowly self-adjusts if your throat collapses and keeps a record every day of how well you are treated-this information is available to you and your care team.  BPAP is bilevel positive airway pressure that keeps your throat open and also assists each breath with a pressure boost to maintain adequate breathing.  Special kinds of BPAP are used in patients who have inadequate breathing from lung or heart disease. In most cases, the device is  smart  and can slowly self-adjusts to assist breathing. Like CPAP, the device keeps a record of how well you are treated.  Your mask is your connection to the device. You get to choose what feels most comfortable  and the staff will help to make sure if fits. Here: are some examples of the different masks that are available:       Key points to remember on your journey with sleep apnea:  Sleep study.  PAP devices often need to be adjusted during a sleep study to show that they are effective and adjusted right.  Good tips to remember: Try wearing just the mask during a quiet time during the day so your body adapts to wearing it. A humidifier is recommended for comfort in most cases to prevent drying of your nose and throat. Allergy medication from your provider may help you if you are having nasal congestion.  Getting settled-in. It takes more than one night for most of us to get used to wearing a mask. Try wearing just the mask during a quiet time during the day so your body adapts to wearing it. A humidifier is recommended for comfort in most cases. Our team will work with you carefully on the first day and will be in contact within 4 days and again at 2 and 4 weeks for advice and remote device adjustments. Your therapy is evaluated by the device each day.   Use it every night. The more you are able to sleep naturally for 7-8 hours, the more likely you will have good sleep and to prevent health risks or symptoms from sleep apnea. Even if you use it 4 hours it helps. Occasionally all of us are unable to use a medical therapy, in sleep apnea, it is not dangerous to miss one night.   Communicate. Call our skilled team on the number provided on the first day if your visit for problems that make it difficult to wear the device. Over 2 out of 3 patients can learn to wear the device long-term with help from our team. Remember to call our team or your sleep providers if you are unable to wear the device as we may have other solutions for those who cannot adapt to mask CPAP therapy. It is recommended that you sleep your sleep provider within the first 3 months and yearly after that if you are not having problems.   Take care of your  equipment. Make sure you clean your mask and tubing using directions every day and that your filter and mask are replaced as recommended or if they are not working.     BESIDES CPAP, WHAT OTHER THERAPIES ARE THERE?    Positioning Device  Positioning devices are generally used when sleep apnea is mild and only occurs on your back.This example shows a pillow that straps around the waist. It may be appropriate for those whose sleep study shows milder sleep apnea that occurs primarily when lying flat on one's back. Preliminary studies have shown benefit but effectiveness at home may need to be verified by a home sleep test. These devices are generally not covered by medical insurance.    Oral Appliance  What is oral appliance therapy?  An oral appliance device fits on your teeth at night like a retainer used after having braces. The device is made by a specialized dentist and requires several visits over 1-2 months before a manufactured device is made to fit your teeth and is adjusted to prevent your sleep apnea. Once an oral device is working properly, snoring should be improved. A home sleep test may be recommended at that time if to determine whether the sleep apnea is adequately treated.       Some things to remember:  -Oral devices are often, but not always, covered by your medical insurance. Be sure to check with your insurance provider.   -If you are referred for oral therapy, you will be given a list of specialized dentists to consider or you may choose to visit the Web site of the American Academy of Dental Sleep Medicine  -Oral devices are less likely to work if you have severe sleep apnea or are extremely overweight.     More detailed information  An oral appliance is a small acrylic device that fits over the upper and lower teeth  (similar to a retainer or a mouth guard). This device slightly moves jaw forward, which moves the base of the tongue forward, opens the airway, improves breathing for effective  treat snoring and obstructive sleep apnea in perhaps 7 out of 10 people .  The best working devices are custom-made by a dental device  after a mold is made of the teeth 1, 2, 3.  When is an oral appliance indicated?  Oral appliance therapy is recommended as a first-line treatment for patients with primary snoring, mild sleep apnea, and for patients with moderate sleep apnea who prefer appliance therapy to use of CPAP4, 5. Severity of sleep apnea is determined by sleep testing and is based on the number of respiratory events per hour of sleep.   How successful is oral appliance therapy?  The success rate of oral appliance therapy in patients with mild sleep apnea is 75-80% while in patients with moderate sleep apnea it is 50-70%. The chance of success in patients with severe sleep apnea is 40-50%. The research also shows that oral appliances have a beneficial effect on the cardiovascular health of LYNNETTE patients at the same magnitude as CPAP therapy7.  Oral appliances should be a second-line treatment in cases of severe sleep apnea, but if not completely successful then a combination therapy utilizing CPAP plus oral appliance therapy may be effective. Oral appliances tend to be effective in a broad range of patients although studies show that the patients who have the highest success are females, younger patients, those with milder disease, and less severe obesity. 3, 6.   Finding a dentist that practices dental sleep medicine  Specific training is available through the American Academy of Dental Sleep Medicine for dentists interested in working in the field of sleep. To find a dentist who is educated in the field of sleep and the use of oral appliances, near you, visit the Web site of the American Academy of Dental Sleep Medicine.    References  1. Iraj et al. Objectively measured vs self-reported compliance during oral appliance therapy for sleep-disordered breathing. Chest 2013; 144(5):  3525-2985.  2. Clayton et al. Objective measurement of compliance during oral appliance therapy for sleep-disordered breathing. Thorax 2013; 68(1): 91-96.  3. Shan et al. Mandibular advancement devices in 620 men and women with LYNNETTE and snoring: tolerability and predictors of treatment success. Chest 2004; 125: 3652-2252.  4. Mitchell, et al. Oral appliances for snoring and LYNNETTE: a review. Sleep 2006; 29: 244-262.  5. Ese et al. Oral appliance treatment for LYNNETTE: an update. J Clin Sleep Med 2014; 10(2): 215-227.  6. Tree et al. Predictors of OSAH treatment outcome. J Dent Res 2007; 86: 1648-0166.      Weight Loss:    Weight loss is a long-term strategy that may improve sleep apnea in some patients.    Weight management is a personal decision.  If you are interested in exploring weight loss strategies, the following discussion covers the impact on weight loss on sleep apnea and the approaches that may be successful.    Weight loss decreases severity of sleep apnea in most people with obesity. For those with mild obesity who have developed snoring with weight gain, even 15-30 pound weight loss can improve and occasionally eliminate sleep apnea.  Structured and life-long dietary and health habits are necessary to lose weight and keep healthier weight levels.     Though there may be significant health benefits from weight loss, long-term weight loss is very difficult to achieve- studies show success with dietary management in less than 10% of people. In addition, substantial weight loss may require years of dietary control and may be difficult if patients have severe obesity. In these cases, surgical management may be considered.  Finally, older individuals who have tolerated obesity without health complications may be less likely to benefit from weight loss strategies.    Your BMI is Body mass index is 31.81 kg/m .    Weight management plan: Discussed healthy diet and exercise  guidelines    Surgery:    Surgery for obstructive sleep apnea is considered generally only when other therapies fail to work. Surgery may be discussed with you if you are having a difficult time tolerating CPAP and or when there is an abnormal structure that requires surgical correction.  Nose and throat surgeries often enlarge the airway to prevent collapse.  Most of these surgeries create pain for 1-2 weeks and up to half of the most common surgeries are not effective throughout life.  You should carefully discuss the benefits and drawbacks to surgery with your sleep provider and surgeon to determine if it is the best solution for you.   More information  Surgery for LYNNETTE is directed at areas that are responsible for narrowing or complete obstruction of the airway during sleep.  There are a wide range of procedures available to enlarge and/or stabilize the airway to prevent blockage of breathing in the three major areas where it can occur: the palate, tongue, and nasal regions.  Successful surgical treatment depends on the accurate identification of the factors responsible for obstructive sleep apnea in each person.  A personalized approach is required because there is no single treatment that works well for everyone.  Because of anatomic variation, consultation with an examination by a sleep surgeon is a critical first step in determining what surgical options are best for each patient.  In some cases, examination during sedation may be recommended in order to guide the selection of procedures.  Patients will be counseled about risks and benefits as well as the typical recovery course after surgery. Surgery is typically not a cure for a person s LYNNETTE.  However, surgery will often significantly improve one s LYNNETTE severity (termed  success rate ).  Even in the absence of a cure, surgery will decrease the cardiovascular risk associated with OSA7; improve overall quality of life8 (sleepiness, functionality, sleep  quality, etc).      Palate Procedures:  Patients with LYNNETTE often have narrowing of their airway in the region of their tonsils and uvula.  The goals of palate procedures are to widen the airway in this region as well as to help the tissues resist collapse.  Modern palate procedure techniques focus on tissue conservation and soft tissue rearrangement, rather than tissue removal.  Often the uvula is preserved in this procedure. Residual sleep apnea is common in patient after pharyngoplasty with an average reduction in sleep apnea events of 33%2.      Tongue Procedures:  ExamWhile patients are awake, the muscles that surround the throat are active and keep this region open for breathing. These muscles relax during sleep, allowing the tongue and other structures to collapse and block breathing.  There are several different tongue procedures available.  Selection of a tongue base procedure depends on characteristics seen on physical exam.  Generally, procedures are aimed at removing bulky tissues in this area or preventing the back of the tongue from falling back during sleep.  Success rates for tongue surgery range from 50-62%3.    Hypoglossal Nerve Stimulation:  Hypoglossal nerve stimulation has recently received approval from the [place] Food and Drug Administration for the treatment of obstructive sleep apnea.  This is based on research showing that the system was safe and effective in treating sleep apnea6.  Results showed that the median AHI score decreased 68%, from 29.3 to 9.0. This therapy uses an implant system that senses breathing patterns and delivers mild stimulation to airway muscles, which keeps the airway open during sleep.  The system consists of three fully implanted components: a small generator (similar in size to a pacemaker), a breathing sensor, and a stimulation lead.  Using a small handheld remote, a patient turns the therapy on before bed and off upon awakening.    Candidates for this  device must be greater than 22 years of age, have moderate to severe LYNNETTE (AHI between 20-65), BMI less than 32, have tried CPAP/oral appliance without success, and have appropriate upper airway anatomy (determined by a sleep endoscopy performed by Dr. Santacruz).    Hypoglossal Nerve Stimulation Pathway:    The sleep surgeon s office will work with the patient through the insurance prior-authorization process (including communications and appeals).    Nasal Procedures:  Nasal obstruction can interfere with nasal breathing during the day and night.  Studies have shown that relief of nasal obstruction can improve the ability of some patients to tolerate positive airway pressure therapy for obstructive sleep apnea1.  Treatment options include medications such as nasal saline, topical corticosteroid and antihistamine sprays, and oral medications such as antihistamines or decongestants. Non-surgical treatments can include external nasal dilators for selected patients. If these are not successful by themselves, surgery can improve the nasal airway either alone or in combination with these other options.      Combination Procedures:  Combination of surgical procedures and other treatments may be recommended, particularly if patients have more than one area of narrowing or persistent positional disease.  The success rate of combination surgery ranges from 66-80%2,3.    References  Jeferson SOSA. The Role of the Nose in Snoring and Obstructive Sleep Apnoea: An Update.  Eur Arch Otorhinolaryngol. 2011; 268: 1365-73.   Aquiles SM; Emely JA; Fidelia JR; Pallanch JF; Jadon MB; Dann SG; Christa GODINEZ. Surgical modifications of the upper airway for obstructive sleep apnea in adults: a systematic review and meta-analysis. SLEEP 2010;33(10):0942-9933. Megha CASTILLO. Hypopharyngeal surgery in obstructive sleep apnea: an evidence-based medicine review.  Arch Otolaryngol Head Neck Surg. 2006 Feb;132(2):206-13.  Konrad YH1, Maida Y, Ady VANDANA. The  efficacy of anatomically based multilevel surgery for obstructive sleep apnea. Otolaryngol Head Neck Surg. 2003 Oct;129(4):327-35.  Megha CASTILLO, Goldberg A. Hypopharyngeal Surgery in Obstructive Sleep Apnea: An Evidence-Based Medicine Review. Arch Otolaryngol Head Neck Surg. 2006 Feb;132(2):206-13.  Emiliana PJ et al. Upper-Airway Stimulation for Obstructive Sleep Apnea.  N Engl J Med. 2014 Jan 9;370(2):139-49.  Rivera Y et al. Increased Incidence of Cardiovascular Disease in Middle-aged Men with Obstructive Sleep Apnea. Am J Respir Crit Care Med; 2002 166: 159-165  Varela EM et al. Studying Life Effects and Effectiveness of Palatopharyngoplasty (SLEEP) study: Subjective Outcomes of Isolated Uvulopalatopharyngoplasty. Otolaryngol Head Neck Surg. 2011; 144: 623-631.

## 2024-03-21 VITALS
RESPIRATION RATE: 18 BRPM | OXYGEN SATURATION: 100 % | DIASTOLIC BLOOD PRESSURE: 86 MMHG | BODY MASS INDEX: 33.41 KG/M2 | WEIGHT: 159.17 LBS | HEART RATE: 88 BPM | SYSTOLIC BLOOD PRESSURE: 134 MMHG | HEIGHT: 58 IN

## 2024-03-21 NOTE — ASSESSMENT & PLAN NOTE
STOP BANG score is 3/8. Patient likely has sleep apnea based on clinical history of Snoring, EDS (ESS 8/24), witnessed apneas, insomnia (TERESA 25/28), snore arousals, nocturia, morning headache, and morning dry mouth.   Obstructive sleep apnea reviewed. Complications of Untreated Sleep Apnea Reviewed.  HST/ Polysomnography reviewed. Information provided regarding treatment options for LYNNETTE.  Recommend HST to evaluate for LYNNETTE due to elevated risk for LYNNETTE

## 2024-03-21 NOTE — PROGRESS NOTES
[facility]  Consultation Note    Name: Ayesha Casas MRN#: [id number]   Age: 33 year old YOB: 1990     Date of Consultation: 03/20/24  Consultation is requested by: Nahum Tran  Primary care provider: Mari Ford MD PhD    History of Present Illness:   Ayesha Casas is a 33 year old female patient with DIVYA, ADHD, Asthma, insomnia, Nightmare disorder, and alcohol use disorder in remission   She is sent by Nahum Tran for a sleep consultation regarding Consult (2.5 years concern of insomnia, avg 4 hours per night, affecting quality of light, short term memory, Covid )  .    Ayesha Casas main reason for visit: Sleeping issues  Patient states problem(s) started: 2+  Ayesha Casas's goals for this visit: Sleep study    She has not had a previous sleep study.    CPAP: No    Assessment and Plan:     Problem List Items Addressed This Visit          Respiratory    Obstructive sleep apnea - Primary     STOP BANG score is 3/8. Patient likely has sleep apnea based on clinical history of Snoring, EDS (ESS 8/24), witnessed apneas, insomnia (TERESA 25/28), snore arousals, nocturia, morning headache, and morning dry mouth.   Obstructive sleep apnea reviewed. Complications of Untreated Sleep Apnea Reviewed.  HST/ Polysomnography reviewed. Information provided regarding treatment options for LYNNETTE.  Recommend HST to evaluate for LYNNETTE due to elevated risk for LYNNETTE         Relevant Orders    HST-Home Sleep Apnea Test - Noxturnal Returnable       Other    Insomnia, unspecified type     Insomnia is likely multifactorial and possibly due to psychophysiological, circadian rhythm sleep wake disorder, paradoxical, anxiety/depression, undiagnosed LYNNETTE, inadequate sleep hygiene, and/or lack of appropriate stimulus control  Encouraged to follow good sleep hygiene/behavioral techniques.  Encouraged patient to establish winding down routine prior to bedtime that includes constructive worry  and relaxation techniques  Will begin with evaluation for LYNNETTE         Relevant Orders    HST-Home Sleep Apnea Test - Noxturnal Returnable       SLEEP-WAKE SCHEDULE:   Work/School Days: Patient goes to school/work: No   Usually gets into bed at 12 at night  Takes patient about It depends on the night to fall asleep  Has trouble falling asleep Everyday nights per week  Wakes up in the middle of the night 4 times a day times.  Wakes up due to Snorting self awake;Use the bathroom;Anxiety;Nightmares  She has trouble falling back asleep Always times a week.   It usually takes 2hrs to get back to sleep  Patient is usually up at 7am  Uses alarm: No  Sleep Inertia: Yes    Weekends/Non-work Days/All Other Days:  Usually gets into bed at 12   Takes patient about 3hrs to fall asleep  Patient is usually up at 7  Uses alarm: No    Sleep Need  Patient gets  4hrs sleep on average   Patient thinks She needs about 8hrs sleep    Ayesha Casas prefers to sleep in this position(s): Back;Side   Patient states they do the following activities in bed: Eat;Watch TV;Use phone, computer, or tablet    Naps  Patient takes a purposeful nap once a day times a week and naps are usually 1hr in duration  She feels better after a nap: No  She dozes off unintentionally Every. Other day days per week  Patient has had a driving accident or near-miss due to sleepiness/drowsiness: No      SLEEP DISRUPTIONS:  Breathing/Snoring  Patient snores:Yes  Other people complain about Her snoring: Yes  Patient has been told She stops breathing in Her sleep:Yes  She has issues with the following: Morning headaches;Morning mouth dryness;Stuffy nose when you wake up;Getting up to urinate more than once    Movement:  Patient gets pain, discomfort, with an urge to move:  No  It happens when She is resting:  No  It happens more at night:  No  Patient has been told Ayesha Casas kicks Her legs at night:  No     Behaviors in Sleep:  Ayesha Casas has  experienced the following behaviors while sleeping: Sleep-talking;Teeth grinding;Night terrors (screaming,yelling or acting afraid but not recalling event)  She has experienced sudden muscle weakness during the day: Yes     Is there anything else you would like your sleep provider to know: No      CAFFEINE AND OTHER SUBSTANCES:  Patient consumes caffeinated beverages per day:  None  Last caffeine use is usually: None  List of any prescribed or over the counter stimulants that patient takes: Trazodone and Tylenol  List of any prescribed or over the counter sleep medication patient takes: Trazadone  List of previous sleep medications that patient has tried: Trazadone  Patient drinks alcohol to help them sleep: Yes  Patient drinks alcohol near bedtime: Yes    Family History:  Patient has a family member been diagnosed with a sleep disorder: No            SCALES:  EPWORTH SLEEPINESS SCALE       3/20/2024     2:00 PM    Crumrod Sleepiness Scale ( GLORIA Eduardo  [id number]<br>ESS - [place]/English - Final version - 21 Nov 07 - [facility] [place].)   Sitting and reading Moderate chance of dozing   Watching TV Moderate chance of dozing   Sitting, inactive in a public place (e.g. a theatre or a meeting) Moderate chance of dozing   As a passenger in a car for an hour without a break Slight chance of dozing   Lying down to rest in the afternoon when circumstances permit Slight chance of dozing   Sitting and talking to someone Would never doze   Sitting quietly after a lunch without alcohol Would never doze   In a car, while stopped for a few minutes in traffic Would never doze   Crumrod Score (MC) 8   Crumrod Score (Sleep) 8       INSOMNIA SEVERITY INDEX (TERESA)        3/20/2024     1:42 PM   Insomnia Severity Index (TERESA)   Difficulty falling asleep 3   Difficulty staying asleep 2   Problems waking up too early 4   How SATISFIED/DISSATISFIED are you with your CURRENT sleep pattern? 4   How NOTICEABLE to others do you think your  "sleep problem is in terms of impairing the quality of your life? 4   How WORRIED/DISTRESSED are you about your current sleep problem? 4   To what extent do you consider your sleep problem to INTERFERE with your daily functioning (e.g. daytime fatigue, mood, ability to function at work/daily chores, concentration, memory, mood, etc.) CURRENTLY? 4   TERESA Total Score 25    Guidelines for Scoring/Interpretation:  Total score categories:  0-7 = No clinically significant insomnia   8-14 = Subthreshold insomnia   15-21 = Clinical insomnia (moderate severity)  22-28 = Clinical insomnia (severe)  Used via courtesy of www.[employer].[place].gov with permission from Bird Canales PhD., [facility]      STOP BANG   LYNNETTE Medium Risk            Total Score: 3    LYNNETTE: Snores loudly    LYNNETTE: Often tired    LYNNETTE: Observed stopped breathing          GAD7      2/13/2023     1:32 PM   DIVYA-7    1. Feeling nervous, anxious, or on edge 3   2. Not being able to stop or control worrying 3   3. Worrying too much about different things 3   4. Trouble relaxing 3   5. Being so restless that it is hard to sit still 2   6. Becoming easily annoyed or irritable 2   7. Feeling afraid, as if something awful might happen 2   DIVYA-7 Total Score 18   If you checked any problems, how difficult have they made it for you to do your work, take care of things at home, or get along with other people? Very difficult         CAGE-AID       No data to display              CAGE-AID reprinted with permission from the [place] Medical Journal, ELDA Evans. and NEETU Krueger, \"Conjoint screening questionnaires for alcohol and drug abuse\" [place] Medical Journal 94: 135-140, 1995.      PATIENT HEALTH QUESTIONNAIRE-9 (PHQ - 9)      11/17/2023     9:04 AM   PHQ-9 ([employer])   1.  Little interest or pleasure in doing things 0   2.  Feeling down, depressed, or hopeless 0   3.  Trouble falling or staying asleep, or sleeping too much 3   4.  Feeling tired or having little energy " 3   5.  Poor appetite or overeating 3   6.  Feeling bad about yourself - or that you are a failure or have let yourself or your family down 0   7.  Trouble concentrating on things, such as reading the newspaper or watching television 0   8.  Moving or speaking so slowly that other people could have noticed. Or the opposite - being so fidgety or restless that you have been moving around a lot more than usual 0   9.  Thoughts that you would be better off dead, or of hurting yourself in some way 0   PHQ-9 Total Score 9   6.  Feeling bad about yourself 0   7.  Trouble concentrating 0   8.  Moving slowly or restless 0   9.  Suicidal or self-harm thoughts 0   1.  Little interest or pleasure in doing things Not at all   2.  Feeling down, depressed, or hopeless Not at all   3.  Trouble falling or staying asleep, or sleeping too much Nearly every day   4.  Feeling tired or having little energy Nearly every day   5.  Poor appetite or overeating Nearly every day   6.  Feeling bad about yourself Not at all   7.  Trouble concentrating Not at all   8.  Moving slowly or restless Not at all   9.  Suicidal or self-harm thoughts Not at all   PHQ-9 via [facility] TOTAL SCORE-----> 9 (Mild depression)   Difficulty at work, home, or with people Not difficult at all     Developed by Erlinda Duke, Pura Salgado, Fredy Nicolas and colleagues, with an educational samy from [employer] No permission required to reproduce, translate, display or distribute.      Allergies:    Allergies   Allergen Reactions    Penicillins Hives       Medications:    Current Outpatient Medications   Medication Sig Dispense Refill    acetaminophen (TYLENOL) 500 MG tablet Take 1-2 tablets (500-1,000 mg) by mouth every 6 hours as needed for fever or pain 30 tablet 0    albuterol (PROAIR HFA/PROVENTIL HFA/VENTOLIN HFA) 108 (90 Base) MCG/ACT inhaler Inhale 2 puffs into the lungs every 6 hours as needed for shortness of breath, wheezing or cough For more  refills,schedule an appointment 18 g 1    traZODone (DESYREL) 50 MG tablet Take 1 tablet (50 mg) by mouth at bedtime 30 tablet 1    ZOLMitriptan (ZOMIG) 5 MG tablet Take 1 tablet (5 mg) by mouth at onset of headache for migraine May repeat in 2 hours. Max 2 tablets/24 hours. 18 tablet 3    zolpidem (AMBIEN) 5 MG tablet Take tablet by mouth 15 minutes prior to sleep, for Sleep Study 1 tablet 0    albuterol (PROAIR HFA/PROVENTIL HFA/VENTOLIN HFA) 108 (90 Base) MCG/ACT inhaler Inhale 2 puffs into the lungs every 6 hours 6.7 g 0    amLODIPine (NORVASC) 5 MG tablet Take 5 mg by mouth daily (Patient not taking: Reported on 3/20/2024)      escitalopram (LEXAPRO) 5 MG tablet Take 1 tablet (5 mg) by mouth daily (Patient not taking: Reported on 3/20/2024) 30 tablet 1    LORazepam (ATIVAN) 0.5 MG tablet Take 1 tablet (0.5 mg) by mouth as needed for anxiety (prior to MRI) Bring to MRI appointment 3 tablet 0    prazosin (MINIPRESS) 1 MG capsule   0    triamcinolone (KENALOG) 0.1 % external cream Apply sparingly to affected area three times daily for 14 days. 30 g 0    vitamin D2 (ERGOCALCIFEROL) 26122 units (1250 mcg) capsule TAKE 1 CAPSULE BY MOUTH 1 TIME A WEEK (Patient not taking: Reported on 3/20/2024) 12 capsule 1       Problem List:  Patient Active Problem List    Diagnosis Date Noted    Obstructive sleep apnea 03/20/2024     Priority: Medium    Insomnia, unspecified type 03/20/2024     Priority: Medium    Vitamin D deficiency 10/01/2018     Priority: Medium    Obesity, Class I, BMI 30-34.9 03/07/2018     Priority: Medium    Intermittent asthma 07/20/2017     Priority: Medium    Adjustment disorder with anxious mood 04/25/2017     Priority: Medium    Borderline personality disorder (H) 04/25/2017     Priority: Medium    Tobacco use disorder 12/19/2012     Priority: Medium    Anxiety 09/23/2011     Priority: Medium    CARDIOVASCULAR SCREENING; LDL GOAL LESS THAN 160 10/31/2010     Priority: Medium        Past  Medical/Surgical History:  Past Medical History:   Diagnosis Date    ADHD (attention deficit hyperactivity disorder) 01/01/1997    resolved    Alcohol use disorder, moderate, dependence (H)     Anxiety 01/01/2003    Fibrocystic breast changes 01/01/2010    PTSD (post-traumatic stress disorder) 01/01/2003    Severe persistent asthma (H28) 01/01/2010    Tobacco use disorder      Past Surgical History:   Procedure Laterality Date    NO HISTORY OF SURGERY         Social History:  Social History     Socioeconomic History    Marital status: Single     Spouse name: Wilbert     Number of children: 0    Years of education: Not on file    Highest education level: Not on file   Occupational History    Occupation: PCA     Employer: [employer]     Comment: part time    Occupation: student     Comment: general prerequisites before [employer] [facility]    Occupation: snow shoveling service     Comment: part time   Tobacco Use    Smoking status: Some Days     Types: Cigars    Smokeless tobacco: Never   Vaping Use    Vaping Use: Not on file   Substance and Sexual Activity    Alcohol use: Yes     Comment: beer 2/d    Drug use: Yes     Types: Marijuana    Sexual activity: Yes     Partners: Female     Birth control/protection: Injection   Other Topics Concern    Parent/sibling w/ CABG, MI or angioplasty before 65F 55M? No   Social History Narrative    Lives alone in [facility]      Social Determinants of Health     Financial Resource Strain: Low Risk  (11/17/2023)    Financial Resource Strain     Within the past 12 months, have you or your family members you live with been unable to get utilities (heat, electricity) when it was really needed?: No   Food Insecurity: Low Risk  (11/17/2023)    Food Insecurity     Within the past 12 months, did you worry that your food would run out before you got money to buy more?: No     Within the past 12 months, did the food you bought just not last and you didn t have money to get more?: No    Transportation Needs: Low Risk  (11/17/2023)    Transportation Needs     Within the past 12 months, has lack of transportation kept you from medical appointments, getting your medicines, non-medical meetings or appointments, work, or from getting things that you need?: No   Physical Activity: Not on file   Stress: Not on file   Social Connections: Not on file   Interpersonal Safety: Low Risk  (11/17/2023)    Interpersonal Safety     Do you feel physically and emotionally safe where you currently live?: Yes     Within the past 12 months, have you been hit, slapped, kicked or otherwise physically hurt by someone?: No     Within the past 12 months, have you been humiliated or emotionally abused in other ways by your partner or ex-partner?: No   Housing Stability: Low Risk  (11/17/2023)    Housing Stability     Do you have housing? : Yes     Are you worried about losing your housing?: No       Family History:  Family History   Problem Relation Age of Onset    Cancer Father     Hypertension Maternal Grandmother     Diabetes Maternal Grandfather     Cancer - colorectal Maternal Grandfather     Breast Cancer Maternal Aunt        Review of Systems:   A complete review of systems reviewed by me is negative with the exeption of what has been mentioned in the history of present illness.  In the last TWO WEEKS have you experienced any of the following symptoms?  Fevers: No  Night Sweats: Yes  Weight Gain: Yes  Pain at Night: No  Double Vision: Yes  Changes in Vision: Yes  Difficulty Breathing through Nose: Yes  Sore Throat in Morning: Yes  Dry Mouth in the Morning: Yes  Shortness of Breath Lying Flat: Yes  Shortness of Breath With Activity: Yes  Awakening with Shortness of Breath: Yes  Increased Cough: No  Heart Racing at Night: Yes  Swelling in Feet or Legs: No  Diarrhea at Night: No  Heartburn at Night: No  Urinating More than Once at Night: Yes  Losing Control of Urine at Night: No  Joint Pains at Night: No  Headaches in  "Morning: Yes  Weakness in Arms or Legs: No  Depressed Mood: Yes  Anxiety: Yes    Physical Exam:   Vitals: /86   Pulse 88   Resp 18   Ht 1.47 m (4' 9.87\")   SpO2 100%   BMI 31.81 kg/m    BMI= Body mass index is 31.81 kg/m .  Neck Cir (cm): 29 cm  GENERAL: alert and no distress  EYES: Eyes grossly normal to inspection.  No discharge or erythema, or obvious scleral/conjunctival abnormalities.  RESP: No audible wheeze, cough, or visible cyanosis.    SKIN: Visible skin clear. No significant rash, abnormal pigmentation or lesions.  NEURO: Cranial nerves grossly intact.  Mentation and speech appropriate for age.  PSYCH: Appropriate affect, tone, and pace of words         Data: All pertinent previous laboratory data reviewed     Recent Labs   Lab Test 02/13/23  1530 09/28/18  0938     --    POTASSIUM 3.7  --    CHLORIDE 103  --    CO2 26  --    ANIONGAP 10  --    * 94   BUN 11.3  --    CR 0.57  --    MARILUZ 9.1  --        Recent Labs   Lab Test 02/13/23  1530   WBC 8.2   RBC 4.15   HGB 13.1   HCT 39.1   MCV 94   MCH 31.6   MCHC 33.5   RDW 12.3          No results for input(s): \"PROTTOTAL\", \"ALBUMIN\", \"BILITOTAL\", \"ALKPHOS\", \"AST\", \"ALT\", \"BILIDIRECT\" in the last 93126 hours.    TSH (mU/L)   Date Value   12/04/2018 1.34       No results found for: \"UAMP\", \"UBARB\", \"BENZODIAZEUR\", \"UCANN\", \"UCOC\", \"OPIT\", \"UPCP\"    No results found for: \"IRONSAT\", \"II91031\", \"AYO\"    No results found for: \"PH\", \"PHARTERIAL\", \"PO2\", \"AO8GRVZPZNI\", \"SAT\", \"PCO2\", \"HCO3\", \"BASEEXCESS\", \"RASHEL\", \"BEB\"    @LABRCNTIPR(phv:4,pco2v:4,po2v:4,hco3v:4,festus:4,o2per:4)@    Echocardiology: No results found for this or any previous visit (from the past 4320 hour(s)).    Chest x-ray: No results found for this or any previous visit from the past 365 days.      Chest CT: No results found for this or any previous visit from the past 365 days.      PFT: Most Recent Breeze Pulmonary Function Testing  No results found    Patient was " strongly advised to avoid driving, operating any heavy machinery or other hazardous situations while drowsy or sleepy.  Patient was counseled on the importance of driving while alert, to pull over if drowsy, or nap before getting into the vehicle if sleepy.      Plan is for Ayesha Casas to follow up following HST.     The above note was dictated using voice recognition software. Although reviewed after completion, some word and grammatical error may remain . Please contact the author for any clarifications.    Total time spent reviewing medical records, history and physical examination, review of previous testing and interpretation as well as documentation on this date, 03/20/24: 52 minutes    Will Dodson MD on 10/20/2022   [facility]   Floor 1, [address]   [address] [place] [address]   Appointments: [phone] [facility]   Floor 1, [address]   [address] [place] [address]   Appointments: [phone]     CC: Nahum Tran

## 2024-03-21 NOTE — ASSESSMENT & PLAN NOTE
Insomnia is likely multifactorial and possibly due to psychophysiological, circadian rhythm sleep wake disorder, paradoxical, anxiety/depression, undiagnosed LYNNETTE, inadequate sleep hygiene, and/or lack of appropriate stimulus control  Encouraged to follow good sleep hygiene/behavioral techniques.  Encouraged patient to establish winding down routine prior to bedtime that includes constructive worry and relaxation techniques  Will begin with evaluation for LYNNETTE

## 2024-04-15 ENCOUNTER — OFFICE VISIT (OUTPATIENT)
Dept: URGENT CARE | Facility: URGENT CARE | Age: 34
End: 2024-04-15
Payer: MEDICARE

## 2024-04-15 VITALS
SYSTOLIC BLOOD PRESSURE: 149 MMHG | HEART RATE: 88 BPM | OXYGEN SATURATION: 99 % | BODY MASS INDEX: 32.83 KG/M2 | WEIGHT: 156.4 LBS | DIASTOLIC BLOOD PRESSURE: 93 MMHG | TEMPERATURE: 98.5 F

## 2024-04-15 DIAGNOSIS — S06.9X0A TRAUMATIC BRAIN INJURY, WITHOUT LOSS OF CONSCIOUSNESS, INITIAL ENCOUNTER (H): Primary | ICD-10-CM

## 2024-04-15 DIAGNOSIS — M43.6 ACUTE MUSCLE STIFFNESS OF NECK: ICD-10-CM

## 2024-04-15 PROCEDURE — 99213 OFFICE O/P EST LOW 20 MIN: CPT | Performed by: FAMILY MEDICINE

## 2024-04-15 RX ORDER — LIDOCAINE 50 MG/G
1 PATCH TOPICAL EVERY 24 HOURS
Qty: 10 PATCH | Refills: 0 | Status: SHIPPED | OUTPATIENT
Start: 2024-04-15 | End: 2024-08-12

## 2024-04-15 RX ORDER — CYCLOBENZAPRINE HCL 5 MG
5 TABLET ORAL 3 TIMES DAILY PRN
Qty: 30 TABLET | Refills: 0 | Status: SHIPPED | OUTPATIENT
Start: 2024-04-15 | End: 2024-08-12

## 2024-04-15 ASSESSMENT — ENCOUNTER SYMPTOMS
NECK PAIN: 1
NECK STIFFNESS: 1
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
## 2024-04-15 NOTE — PATIENT INSTRUCTIONS
- Recommend cognitive rest   - Recommend conservative management including use of analgesia like Tylenol or Ibuprofen every 4-6 hours, consider applying Voltaren gel to the location as needed.    - Recommend using over the counter Icy hot or lidocaine patch for local relief.    - Recommend gentle exercises, yoga, stretching, physical therapy.

## 2024-04-16 ENCOUNTER — OFFICE VISIT (OUTPATIENT)
Dept: SLEEP MEDICINE | Facility: CLINIC | Age: 34
End: 2024-04-16
Payer: MEDICARE

## 2024-04-16 DIAGNOSIS — G47.00 INSOMNIA, UNSPECIFIED TYPE: Primary | ICD-10-CM

## 2024-04-16 DIAGNOSIS — G47.33 OBSTRUCTIVE SLEEP APNEA: ICD-10-CM

## 2024-04-16 PROCEDURE — 95800 SLP STDY UNATTENDED: CPT | Performed by: STUDENT IN AN ORGANIZED HEALTH CARE EDUCATION/TRAINING PROGRAM

## 2024-04-16 NOTE — PROGRESS NOTES
Pt is completing a home sleep test. Pt was instructed on how to put on the Noxturnal T3 device and associated equipment before going to bed and given the opportunity to practice putting it on before leaving the sleep center. Pt was reminded to bring the home sleep test kit back to the center tomorrow, at agreed upon time for download and reporting.   Neck circumference: 29 CM / 11.25 inches.  Jessica Ochoa CMA, HST Specialist  [place] [facility]

## 2024-04-17 ENCOUNTER — TELEPHONE (OUTPATIENT)
Dept: SLEEP MEDICINE | Facility: CLINIC | Age: 34
End: 2024-04-17

## 2024-04-17 ENCOUNTER — DOCUMENTATION ONLY (OUTPATIENT)
Dept: SLEEP MEDICINE | Facility: CLINIC | Age: 34
End: 2024-04-17
Payer: MEDICARE

## 2024-04-17 NOTE — TELEPHONE ENCOUNTER
Reason for Call:  Other call back    Detailed comments: patient called and would [facility] An[facility] to call her back today about questionnaire needed due to HST drop off.    She forgot the questions needed to give.    Thank you.    Phone Number Patient can be reached at: Cell number on file:    Telephone Information:   Mobile [phone]       Best Time: any    Can we leave a detailed message on this number? YES    Call taken on 4/17/2024 at 8:14 AM by Arianna Hernandez

## 2024-04-17 NOTE — TELEPHONE ENCOUNTER
Spoke to the patient and let her know that we did receive the questionnaire and it is okay that she didn't get it filled out.

## 2024-04-17 NOTE — PROGRESS NOTES
No post questionnaire returned with the study.  Jessica Ochoa CMA, HST Specialist  [facility] / [facility] [place]

## 2024-04-19 NOTE — PROGRESS NOTES
This HSAT was performed using a Noxturnal T3 device which recorded snore, sound, movement activity, body position, nasal pressure, oronasal thermal airflow, pulse, oximetry and both chest and abdominal respiratory effort. HSAT data was restricted to the time patient states they were in bed.     HSAT was scored using 1B 4% hypopnea rule.     AHI: 3.2  Snoring was reported as loud.  Time with SpO2 below 89% was 0 minutes.   Overall signal quality was good     Pt will follow up with sleep provider to determine appropriate therapy.     Ordering Provider, Will Dodson MD C. Oyugi, BA, RPSGT, RST System Clinical Specialist/ 4/19/2024

## 2024-04-26 ENCOUNTER — MYC MEDICAL ADVICE (OUTPATIENT)
Dept: OBGYN | Facility: CLINIC | Age: 34
End: 2024-04-26
Payer: MEDICARE

## 2024-04-26 RX ORDER — TRAZODONE HYDROCHLORIDE 50 MG/1
50 TABLET, FILM COATED ORAL AT BEDTIME
Qty: 30 TABLET | Refills: 0 | OUTPATIENT
Start: 2024-04-26

## 2024-05-09 NOTE — PROCEDURES
"HOME SLEEP STUDY INTERPRETATION        Patient: Ayesha Casas  MRN: [id number]  YOB: 1990  Study Date: 4/16/2024  PCP/Referring Provider: Mari Ford;   Ordering Provider: Will Dodson MD      Indications for Home Study: Ayesha Casas is a 33 year old female with a history of DIVYA, ADHD, Asthma, insomnia, Nightmare disorder, and alcohol use disorder in remission who presents with symptoms suggestive of obstructive sleep apnea.    Estimated body mass index is 32.83 kg/m  as calculated from the following:    Height as of 3/20/24: 1.47 m (4' 9.87\").    Weight as of 4/15/24: 70.9 kg (156 lb 6.4 oz).  Total score - [place]: 8 (3/20/2024  2:00 PM)  Total Score: 3 (3/20/2024  2:01 PM)        Data: A full night home sleep study was performed recording the standard physiologic parameters including body position, movement, sound, nasal pressure, thermal oral airflow, chest and abdominal movements with respiratory inductance plethysmography, and oxygen saturation by pulse oximetry. Pulse rate was estimated by oximetry recording. This study was considered adequate based on > 4 hours of quality oximetry and respiratory recording. As specified by the AASM Manual for the Scoring of Sleep and Associated events, version 2.3, Rule VIII.D 1B, 4% oxygen desaturation scoring for hypopneas is used as a standard of care on all home sleep apnea testing.        Analysis Time:  426 minutes        Respiration:   Sleep Associated Hypoxemia: sustained hypoxemia was not present. Baseline oxygen saturation was 97%.  Time with saturation less than or equal to 88% was 0 minutes. The lowest oxygen saturation was 90%.   Snoring: Snoring was present.  Respiratory events: The home study revealed a presence of 4obstructive apneas and 9 mixed and central apneas. There were 10 hypopneas resulting in a combined apnea/hypopnea index [AHI] of 3.2 events per hour.  AHI was 12.1 per hour supine, 0 per hour prone, 3.1 " per hour on left side, and 3.2 per hour on right side.   Pattern: Excluding events noted above, respiratory rate and pattern was Normal.      Position: Percent of time spent: supine - 1.2%, prone - 0%, on left - 76.7%, on right - 22.1%.      Heart Rate: By pulse oximetry normal rate was noted.       Assessment:   No clinically significant obstructive sleep apnea.  Sleep associated hypoxemia was not present.  Home Sleep Study may not be sensitive enough to detect true nature of underlying sleep disordered breathing.    Recommendations:  Consider positional therapy or repeat sleep testing with laboratory polysomnography .  Suggest optimizing sleep hygiene and avoiding sleep deprivation.  Weight management.        Diagnosis Code(s): Snoring R06.83    Will Dodson MD, May 8, 2024   Diplomate, American Board of Internal Medicine, Sleep Medicine

## 2024-05-16 ENCOUNTER — TELEPHONE (OUTPATIENT)
Dept: OBGYN | Facility: CLINIC | Age: 34
End: 2024-05-16
Payer: MEDICARE

## 2024-05-17 ENCOUNTER — ALLIED HEALTH/NURSE VISIT (OUTPATIENT)
Dept: OBGYN | Facility: CLINIC | Age: 34
End: 2024-05-17
Attending: REGISTERED NURSE
Payer: MEDICARE

## 2024-05-17 DIAGNOSIS — Z30.42 ENCOUNTER FOR SURVEILLANCE OF INJECTABLE CONTRACEPTIVE: Primary | ICD-10-CM

## 2024-05-17 PROCEDURE — 250N000011 HC RX IP 250 OP 636: Mod: JZ | Performed by: FAMILY MEDICINE

## 2024-05-17 PROCEDURE — 96372 THER/PROPH/DIAG INJ SC/IM: CPT | Performed by: FAMILY MEDICINE

## 2024-05-17 RX ADMIN — MEDROXYPROGESTERONE ACETATE 150 MG: 150 INJECTION, SUSPENSION INTRAMUSCULAR at 13:35

## 2024-05-17 NOTE — NURSING NOTE
Clinic Administered Medication Documentation      Depo Provera Documentation    Depo-Provera Standing Order inclusion/exclusion criteria reviewed.     Is this the initial or subsequent dose of Depo Provera? Subsequent dose - patient is within the acceptable window of time (11-15 weeks) for subsequent injection. Pregnancy test not indicated.    Patient meets: inclusion criteria     Is there an active order (written within the past 365 days, with administrations remaining, not ) in the chart? Yes.     Prior to injection, verified patient identity using patient's name and date of birth. Medication was administered. Please see MAR and medication order for additional information.     Vial/Syringe: Single dose vial. Was entire vial of medication used? Yes    Patient instructed to remain in clinic for 15 minutes and report any adverse reaction to staff immediately.  NEXT INJECTION DUE: 24 - 24    Verified that the patient has refills remaining in their prescription. Patient also has appointment with Dr. Mari Ford in 2024

## 2024-06-06 ENCOUNTER — OFFICE VISIT (OUTPATIENT)
Dept: NEUROLOGY | Facility: CLINIC | Age: 34
End: 2024-06-06
Payer: MEDICARE

## 2024-06-06 DIAGNOSIS — G43.709 CHRONIC MIGRAINE WITHOUT AURA WITHOUT STATUS MIGRAINOSUS, NOT INTRACTABLE: Primary | ICD-10-CM

## 2024-06-06 PROCEDURE — 64615 CHEMODENERV MUSC MIGRAINE: CPT

## 2024-06-06 NOTE — LETTER
6/6/2024      Ayesha Casas  [address]  [place] [address]      Dear Colleague,    Thank you for referring your patient, Ayesha Casas, to the [facility]. Please see a copy of my visit note below.    [facility]  Botulinum Toxin Procedure    Nancy Babcock PA-C  Headache Neurology    June 6, 2024    Procedure: OnabotulinumtoxinA injections for chronic migraine  Indication: Chronic migraine    Ayesha Casas suffers from severe intractable headaches. She was referred by Nancy Babcock PA-C for onabotulinumtoxinA injections for headache.       At baseline, her headaches are a throbbing pain at bilateral temples or forehead. She has associated nausea, paresthesias of both hands, photophobia and osmophobia. Headaches last 4 hours if untreated and can reach a 10/10 in severity.      Prior to initiation of botulinum toxin injections, Ayesha reported 30/30 headache days per month, with 8+/30 severe headache days per month. Her headaches are quite disabling and often interfere with her ability to function normally.    Their last round of botulinum toxin injections was on 3/14/2024.    Ayesha reports 12-16 headache days per month currently, with 1-2 severe headache days per month. She has noticed a wearing off phenomenon prior to this round of botulinum toxin injections, lasting 4-6 weeks.     Ayesha reports the following benefits of botulinum toxin injections from their last round:   Headache severity is improved so she is more functional on a daily basis. Now, severe migraines are only occurring around menses.     She has attempted other migraine prophylactic treatments in the past, which have included: sertraline, fluoxetine, buspirone, nortriptyline .     She currently takes no other medications for headache prevention.    Patient's pain was assessed prior to the procedure. She rated her pain today as 5 out of 10.      The procedure was explained to  the patient. Benefits of the treatment were discussed including headache and migraine reduction. Risks of the procedure were reviewed including but not limited to pain, bruising, bleeding, infection, and weakness of muscles injected or those distal to injection sites. Alternatives were discussed. The patient voiced understanding of the risks and benefits. All questions answered and patient consented to proceed.    Procedural Pause: Procedural pause was conducted to verify correct patient identity, procedure to be performed, correct side and site, correct patient position, and special requirements. Appropriate hand hygiene was utilized, and each injection site was prepped with alcohol wipes or Chloraprep swab.     Procedure Details:   200 units of onabotulinumtoxinA was diluted in 4 mL 0.9% normal saline.   A total of 150 units of onabotulinumtoxinA were injected using 30 gauge 0.5 inch needles into the muscles listed below. 50 units of onabotulinumtoxinA were wasted.     Injection Sites: Total = 150 units onabotulinumtoxinA     Procerus muscles - 5 units into the procerus muscle (5 units total)     muscles - 5 units into the left  muscle and 5 units into the right  muscle (1 injection site per muscle) (10 units total)    Frontalis muscles - 5 units into the left superior frontalis muscle and 5 units into the right superior frontalis muscle (2 injection sites per muscle) (10 units total)    Temporalis muscles - 12.5 units into the left temporalis muscle and 12.5 units into the right temporalis muscle (2 injection sites per muscle) (25 units total)    Occipitalis muscles - 12.5 units into the left occipitalis muscle and 12.5 units into the right occipitalis muscle (2 injection sites per muscle) (25 units total)    Splenius Capitis muscles - 12.5 units into the left splenius capitis muscle and 12.5 units into the right splenius capitis muscle (2 injection sites per muscle, divided into 2/3  anteriorly and 1/3 posteriorly) (25 units total)      Trapezius muscles - 25 units into the left trapezius muscle and 25 units into the right trapezius muscle (3 injection sites per muscle, divided into 5 units, 10 units, 10 units, medial to lateral) (50 units total)      Patient tolerated the procedure well without immediate complications. She will follow up in clinic for assessment of the effectiveness of treatment. She did not report any change in her pain level after the botulinumtoxinA injection procedure.      Raj Babcock PA-C  Headache Neurology  [facility]      Again, thank you for allowing me to participate in the care of your patient.        Sincerely,        RAJ BABCOCK PA-C

## 2024-06-06 NOTE — PROGRESS NOTES
[facility]  Botulinum Toxin Procedure    Nancy Babcock PA-C  Headache Neurology    June 6, 2024    Procedure: OnabotulinumtoxinA injections for chronic migraine  Indication: Chronic migraine    Ayesha Casas suffers from severe intractable headaches. She was referred by Nancy Babcock PA-C for onabotulinumtoxinA injections for headache.       At baseline, her headaches are a throbbing pain at bilateral temples or forehead. She has associated nausea, paresthesias of both hands, photophobia and osmophobia. Headaches last 4 hours if untreated and can reach a 10/10 in severity.      Prior to initiation of botulinum toxin injections, Ayesha reported 30/30 headache days per month, with 8+/30 severe headache days per month. Her headaches are quite disabling and often interfere with her ability to function normally.    Their last round of botulinum toxin injections was on 3/14/2024.    Ayesha reports 12-16 headache days per month currently, with 1-2 severe headache days per month. She has noticed a wearing off phenomenon prior to this round of botulinum toxin injections, lasting 4-6 weeks.     Ayesha reports the following benefits of botulinum toxin injections from their last round:   Headache severity is improved so she is more functional on a daily basis. Now, severe migraines are only occurring around menses.     She has attempted other migraine prophylactic treatments in the past, which have included: sertraline, fluoxetine, buspirone, nortriptyline .     She currently takes no other medications for headache prevention.    Patient's pain was assessed prior to the procedure. She rated her pain today as 5 out of 10.      The procedure was explained to the patient. Benefits of the treatment were discussed including headache and migraine reduction. Risks of the procedure were reviewed including but not limited to pain, bruising, bleeding, infection, and weakness of muscles injected or those distal to injection  sites. Alternatives were discussed. The patient voiced understanding of the risks and benefits. All questions answered and patient consented to proceed.    Procedural Pause: Procedural pause was conducted to verify correct patient identity, procedure to be performed, correct side and site, correct patient position, and special requirements. Appropriate hand hygiene was utilized, and each injection site was prepped with alcohol wipes or Chloraprep swab.     Procedure Details:   200 units of onabotulinumtoxinA was diluted in 4 mL 0.9% normal saline.   A total of 150 units of onabotulinumtoxinA were injected using 30 gauge 0.5 inch needles into the muscles listed below. 50 units of onabotulinumtoxinA were wasted.     Injection Sites: Total = 150 units onabotulinumtoxinA     Procerus muscles - 5 units into the procerus muscle (5 units total)     muscles - 5 units into the left  muscle and 5 units into the right  muscle (1 injection site per muscle) (10 units total)    Frontalis muscles - 5 units into the left superior frontalis muscle and 5 units into the right superior frontalis muscle (2 injection sites per muscle) (10 units total)    Temporalis muscles - 12.5 units into the left temporalis muscle and 12.5 units into the right temporalis muscle (2 injection sites per muscle) (25 units total)    Occipitalis muscles - 12.5 units into the left occipitalis muscle and 12.5 units into the right occipitalis muscle (2 injection sites per muscle) (25 units total)    Splenius Capitis muscles - 12.5 units into the left splenius capitis muscle and 12.5 units into the right splenius capitis muscle (2 injection sites per muscle, divided into 2/3 anteriorly and 1/3 posteriorly) (25 units total)      Trapezius muscles - 25 units into the left trapezius muscle and 25 units into the right trapezius muscle (3 injection sites per muscle, divided into 5 units, 10 units, 10 units, medial to lateral) (50 units  total)      Patient tolerated the procedure well without immediate complications. She will follow up in clinic for assessment of the effectiveness of treatment. She did not report any change in her pain level after the botulinumtoxinA injection procedure.    Because Zanesha has significant wearing off effect lasting 6 weeks prior to next round of injections, we will see if insurance would cover 200 units of Botox.       Nancy Babcock PA-C  Headache Neurology  [facility]

## 2024-06-07 NOTE — PROGRESS NOTES
Assessment & Plan   Hypertension  History of high blood pressures on multiple occasions in clinic, today with BP of 141/89. Not currently managed with medication. Was taking amlodipine 5mg daily in 1/2024 after an ED visit, but discontinued in 2/2024. I recommend taking blood pressures at home weekly for one month and then messaging me on MyChart with the results so we can discuss options for management of blood pressure.    2. Mild intermittent asthma without complication  Well controlled and without significant findings on exam today. Continue albuterol as needed.  - albuterol (PROAIR HFA/PROVENTIL HFA/VENTOLIN HFA) 108 (90 Base) MCG/ACT inhaler; Inhale 2 puffs into the lungs every 6 hours as needed for shortness of breath, wheezing or cough For more refills,schedule an appointment  Dispense: 18 g; Refill: 1    3. Insomnia, unspecified type  Insomnia and snoring with possible obstructive sleep apnea. Had home sleep study on 5/8/24 which did not show significant apnea, however it was recommended to repeat the study in clinic due to possible false results doing the home test. Recommend calling sleep study clinic to schedule, and she will reach out to me on MyChart if a referral is needed.  - traZODone (DESYREL) 50 MG tablet; Take 1 tablet (50 mg) by mouth at bedtime  Dispense: 30 tablet; Refill: 1    4. Uses birth control  On Depo-Provera, next due 8/2/24.  - medroxyPROGESTERone (DEPO-PROVERA) syringe 150 mg    5. Alcohol use disorder, moderate, dependence (H)  Currently drinking 2 shots per day and 1 non alcoholic beer per day to cope with stressful life events (a lot of death in the family). Alcohol intake is significantly improved compared to the past (used to drink 8 shots daily). She is seeing a therapist 1 time per month who she will continue working with to decrease alcohol consumption. Not currently interested in social work consult, also not interested in AA.     6. DIVYA (generalized anxiety disorder)  Well  managed, no longer taking Lexapro. Continue seeing therapist monthly, as above.    7. Moderate episode of recurrent major depressive disorder (H)  Continue seeing therapist monthly, as above. No harmful thoughts.      8. Intractable chronic migraine without aura and without status migrainosus  Currently managed by Neurology with Botox injections every 3 months. Has some benefit, although continues needing Tylenol daily for pain management. Continue to follow with Neurologist.  - acetaminophen (TYLENOL) 500 MG tablet; Take 1-2 tablets (500-1,000 mg) by mouth every 6 hours as needed for fever or pain  Dispense: 30 tablet; Refill: 0    9. Screen for STD (sexually transmitted disease)  Interested in STI screening today. Not currently sexually active.  - Neisseria gonorrhoeae PCR  - Chlamydia trachomatis PCR  - Treponema Abs w Reflex to RPR and Titer; Future  - Hepatitis B Surface Antibody    10. Need for hepatitis B screening test  Interested in STI screening today.  - Hepatitis B Surface Antibody    11. Screening for HIV (human immunodeficiency virus)  Interested in STI screening today.  - HIV Antigen Antibody Combo; Future    12. Class 1 obesity due to excess calories without serious comorbidity with body mass index (BMI) of 33.0 to 33.9 in adult  Has BMI of 33.74 and is interested in weight management consult; referral sent today.  - Adult Comprehensive Weight Management  Referral; Future    Subjective   Ayesha is a 33 year old woman last seen 2/2023 presenting for routine follow up.    Not currently sexually active, last had sexual intercourse ~3 months ago. Resumed Depo shots on 2/13/23; last administered on 5/17/24. Needs order for Depo renewed. Does not get menstrual periods. Is interested in being screened for STIs today. Denies  symptoms. Last PAP was 1/2023 and normal.      Continues taking trazadone 50mg at night, also on minipress. Sleep quality has been poor, only sleeps ~4-5 hours per night.  Wakes up frequently throughout the night. Had home sleep study 5/8/24 showing possible sleep apnea and was recommended positional therapy, which she is planning on pursuing. Will probably do a repeat formal sleep study in the future.     Alcohol use disorder has been fluctuating. Currently drinking daily (one non-alcoholic beer and 2 shots per day). Still drinks less alcohol than she did a few years ago. Therapy has continued to help with this. Has a lot of family and friend support. Patient feels she does not need another SW referral at this time. Dealing with a lot of loss.       Previously-noted long standing daily headaches are slightly improved. Has been receiving Botox injections for migraines every 3 months since 2023; last injection was 6/6/24. Feels as though this helps significantly, although may be receiving higher dose in future due to wean-off effect. Continues Tylenol as needed (nearly every day).    Previously-noted episodes of chest pressure accompanying headaches are continuing intermittently. Still experiences occasional palpitations at times, which she associates with her anxiety.      Presented to the ED on 1/10/24 with dizziness and was prescribed amlodipine 5mg daily for hypertension, but discontinued sometime in 2/2024. Had appointment with Family Medicine (Evelia Gorman, JHONATAN) on 3/6/24 and had a BP of 131/86, so the decision was made to stay off meds at that time. Not currently taking any meds. Has been checking BP at home (average ~140/90) weekly in the evenings and BP was above goal in clinic today (141/89). Would be interested in logging blood pressure to assess need for medication management.    Mental health recently has been better. No longer takes Lexapro. Still sees therapist one time per month; therapist only has room in schedule for one visit monthly.     Lives in a house with one other person and a dog. Continues to smoke, now up to ~5 cigarettes/day. Smokes marijuana but no other  "recreational drug use.     ROS: 10 point ROS neg other than the symptoms noted above in the HPI.         Objective    BP (!) 141/89   Pulse 86   Ht 1.47 m (4' 9.87\")   Wt 72.9 kg (160 lb 11.2 oz)   LMP 05/10/2024 (Approximate)   BMI 33.74 kg/m      Physical Exam   GENERAL: alert and no acute distress  NECK: no adenopathy, no asymmetry, masses, or scars  RESP: lungs clear to auscultation - no rales, rhonchi or wheezes  CV: regular rate and rhythm, normal S1 S2, no S3 or S4, no murmur, click or rub, no peripheral edema  MS: no gross musculoskeletal defects noted, no edema  SKIN:  multiple tattoos - no suspicious lesions or rashes  NEURO: Normal strength and tone, mentation intact and speech normal  PSYCH: mentation appears normal, affect flat   LYMPH: no cervical, supraclavicular adenopathy    PHQ9 was 6 and DIVYA-7 was 18 today.      The longitudinal plan of care for the diagnosis(es)/condition(s) as documented were addressed during this visit. Due to the added complexity in care, I will continue to support Ayesha in the subsequent management and with ongoing continuity of care.    I was present with the medical student who participated in the service and in the documentation of the note.  I have verified the history and personally performed the physical exam and medical decision making. I agree with the assessment and plan of care as documented in the note.  Mari Ford MD, PhD      Time note (e5, 40'): The total of my time (on the date of service) for this service was 59 minutes, including discussion/face-to-face, chart review, interpretation not otherwise reported, documentation, and updating of the computerized record.      I, Blanca Real, MS1, am acting as scribe for Dr. Mari Ford MD PhD.    Signed Electronically by: Mari Ford MD PhD      "

## 2024-06-10 ENCOUNTER — LAB (OUTPATIENT)
Dept: LAB | Facility: CLINIC | Age: 34
End: 2024-06-10
Attending: FAMILY MEDICINE
Payer: MEDICARE

## 2024-06-10 ENCOUNTER — OFFICE VISIT (OUTPATIENT)
Dept: FAMILY MEDICINE | Facility: CLINIC | Age: 34
End: 2024-06-10
Attending: FAMILY MEDICINE
Payer: MEDICARE

## 2024-06-10 VITALS
DIASTOLIC BLOOD PRESSURE: 89 MMHG | BODY MASS INDEX: 33.73 KG/M2 | SYSTOLIC BLOOD PRESSURE: 141 MMHG | HEART RATE: 86 BPM | WEIGHT: 160.7 LBS | HEIGHT: 58 IN

## 2024-06-10 DIAGNOSIS — Z11.59 NEED FOR HEPATITIS C SCREENING TEST: ICD-10-CM

## 2024-06-10 DIAGNOSIS — J45.20 MILD INTERMITTENT ASTHMA WITHOUT COMPLICATION: ICD-10-CM

## 2024-06-10 DIAGNOSIS — I10 PRIMARY HYPERTENSION: ICD-10-CM

## 2024-06-10 DIAGNOSIS — F10.20 ALCOHOL USE DISORDER, MODERATE, DEPENDENCE (H): ICD-10-CM

## 2024-06-10 DIAGNOSIS — Z11.59 NEED FOR HEPATITIS B SCREENING TEST: ICD-10-CM

## 2024-06-10 DIAGNOSIS — G47.00 INSOMNIA, UNSPECIFIED TYPE: ICD-10-CM

## 2024-06-10 DIAGNOSIS — Z11.3 SCREEN FOR STD (SEXUALLY TRANSMITTED DISEASE): ICD-10-CM

## 2024-06-10 DIAGNOSIS — G43.719 INTRACTABLE CHRONIC MIGRAINE WITHOUT AURA AND WITHOUT STATUS MIGRAINOSUS: ICD-10-CM

## 2024-06-10 DIAGNOSIS — F33.1 MODERATE EPISODE OF RECURRENT MAJOR DEPRESSIVE DISORDER (H): ICD-10-CM

## 2024-06-10 DIAGNOSIS — Z11.4 SCREENING FOR HIV (HUMAN IMMUNODEFICIENCY VIRUS): ICD-10-CM

## 2024-06-10 DIAGNOSIS — F41.1 GAD (GENERALIZED ANXIETY DISORDER): ICD-10-CM

## 2024-06-10 DIAGNOSIS — E66.811 CLASS 1 OBESITY DUE TO EXCESS CALORIES WITHOUT SERIOUS COMORBIDITY WITH BODY MASS INDEX (BMI) OF 33.0 TO 33.9 IN ADULT: ICD-10-CM

## 2024-06-10 DIAGNOSIS — Z78.9 USES BIRTH CONTROL: Primary | ICD-10-CM

## 2024-06-10 DIAGNOSIS — E66.09 CLASS 1 OBESITY DUE TO EXCESS CALORIES WITHOUT SERIOUS COMORBIDITY WITH BODY MASS INDEX (BMI) OF 33.0 TO 33.9 IN ADULT: ICD-10-CM

## 2024-06-10 LAB
HBV SURFACE AB SERPL IA-ACNC: >1000 M[IU]/ML
HBV SURFACE AB SERPL IA-ACNC: REACTIVE M[IU]/ML
HIV 1+2 AB+HIV1 P24 AG SERPL QL IA: NONREACTIVE
T PALLIDUM AB SER QL: NONREACTIVE

## 2024-06-10 PROCEDURE — 86780 TREPONEMA PALLIDUM: CPT

## 2024-06-10 PROCEDURE — 36415 COLL VENOUS BLD VENIPUNCTURE: CPT | Performed by: FAMILY MEDICINE

## 2024-06-10 PROCEDURE — 87491 CHLMYD TRACH DNA AMP PROBE: CPT | Performed by: FAMILY MEDICINE

## 2024-06-10 PROCEDURE — 99417 PROLNG OP E/M EACH 15 MIN: CPT | Performed by: FAMILY MEDICINE

## 2024-06-10 PROCEDURE — 99215 OFFICE O/P EST HI 40 MIN: CPT | Performed by: FAMILY MEDICINE

## 2024-06-10 PROCEDURE — 86706 HEP B SURFACE ANTIBODY: CPT | Performed by: FAMILY MEDICINE

## 2024-06-10 PROCEDURE — 87591 N.GONORRHOEAE DNA AMP PROB: CPT | Performed by: FAMILY MEDICINE

## 2024-06-10 PROCEDURE — G0463 HOSPITAL OUTPT CLINIC VISIT: HCPCS | Performed by: FAMILY MEDICINE

## 2024-06-10 PROCEDURE — G2211 COMPLEX E/M VISIT ADD ON: HCPCS | Performed by: FAMILY MEDICINE

## 2024-06-10 PROCEDURE — 87389 HIV-1 AG W/HIV-1&-2 AB AG IA: CPT

## 2024-06-10 RX ORDER — ALBUTEROL SULFATE 90 UG/1
2 AEROSOL, METERED RESPIRATORY (INHALATION) EVERY 6 HOURS PRN
Qty: 18 G | Refills: 1 | Status: SHIPPED | OUTPATIENT
Start: 2024-06-10 | End: 2024-09-20

## 2024-06-10 RX ORDER — TRAZODONE HYDROCHLORIDE 50 MG/1
50 TABLET, FILM COATED ORAL AT BEDTIME
Qty: 30 TABLET | Refills: 1 | Status: SHIPPED | OUTPATIENT
Start: 2024-06-10 | End: 2024-07-22

## 2024-06-10 RX ORDER — MEDROXYPROGESTERONE ACETATE 150 MG/ML
150 INJECTION, SUSPENSION INTRAMUSCULAR
Status: ACTIVE | OUTPATIENT
Start: 2024-08-02 | End: 2025-07-27

## 2024-06-10 RX ORDER — ACETAMINOPHEN 500 MG
500-1000 TABLET ORAL EVERY 6 HOURS PRN
Qty: 30 TABLET | Refills: 0 | Status: SHIPPED | OUTPATIENT
Start: 2024-06-10

## 2024-06-10 NOTE — PATIENT INSTRUCTIONS
Take blood pressure weekly (at the same time of day) and keep a log. Send me the results on [facility] in 1 month.    Call sleep study clinic and if you need a referral let me know    Lab - urine for STI testing and blood today    Next DEPO due 8/2/24    See me in Aug for annual exam    Continue to work with therapist on decreasing alcohol    Weight management will call you         Thank you for trusting us with your care!     If you need to contact us for questions about:  Symptoms, Scheduling & Medical Questions; Non-urgent (2-3 day response) Quantified Skin message, Urgent (needing response today) [phone] (if after 3:30pm next day response)   Prescriptions: Please call your Pharmacy   Billing: [place] [phone] or STEFANY Hearn:[phone]

## 2024-06-11 LAB
C TRACH DNA SPEC QL NAA+PROBE: NEGATIVE
N GONORRHOEA DNA SPEC QL NAA+PROBE: NEGATIVE

## 2024-06-11 ASSESSMENT — ANXIETY QUESTIONNAIRES
GAD7 TOTAL SCORE: 9
3. WORRYING TOO MUCH ABOUT DIFFERENT THINGS: SEVERAL DAYS
1. FEELING NERVOUS, ANXIOUS, OR ON EDGE: SEVERAL DAYS
6. BECOMING EASILY ANNOYED OR IRRITABLE: MORE THAN HALF THE DAYS
GAD7 TOTAL SCORE: 9
5. BEING SO RESTLESS THAT IT IS HARD TO SIT STILL: MORE THAN HALF THE DAYS
IF YOU CHECKED OFF ANY PROBLEMS ON THIS QUESTIONNAIRE, HOW DIFFICULT HAVE THESE PROBLEMS MADE IT FOR YOU TO DO YOUR WORK, TAKE CARE OF THINGS AT HOME, OR GET ALONG WITH OTHER PEOPLE: SOMEWHAT DIFFICULT
2. NOT BEING ABLE TO STOP OR CONTROL WORRYING: SEVERAL DAYS
7. FEELING AFRAID AS IF SOMETHING AWFUL MIGHT HAPPEN: SEVERAL DAYS

## 2024-06-11 ASSESSMENT — PATIENT HEALTH QUESTIONNAIRE - PHQ9
5. POOR APPETITE OR OVEREATING: SEVERAL DAYS
SUM OF ALL RESPONSES TO PHQ QUESTIONS 1-9: 11

## 2024-06-24 ENCOUNTER — TELEPHONE (OUTPATIENT)
Dept: FAMILY MEDICINE | Facility: CLINIC | Age: 34
End: 2024-06-24

## 2024-06-24 ENCOUNTER — OFFICE VISIT (OUTPATIENT)
Dept: SLEEP MEDICINE | Facility: CLINIC | Age: 34
End: 2024-06-24
Payer: MEDICARE

## 2024-06-24 ENCOUNTER — TELEPHONE (OUTPATIENT)
Dept: OBGYN | Facility: CLINIC | Age: 34
End: 2024-06-24

## 2024-06-24 VITALS
HEART RATE: 84 BPM | BODY MASS INDEX: 33.61 KG/M2 | OXYGEN SATURATION: 100 % | RESPIRATION RATE: 18 BRPM | WEIGHT: 160.1 LBS | SYSTOLIC BLOOD PRESSURE: 124 MMHG | HEIGHT: 58 IN | DIASTOLIC BLOOD PRESSURE: 92 MMHG

## 2024-06-24 DIAGNOSIS — R06.83 PRIMARY SNORING: ICD-10-CM

## 2024-06-24 DIAGNOSIS — G47.00 INSOMNIA, UNSPECIFIED TYPE: Primary | ICD-10-CM

## 2024-06-24 PROBLEM — G47.33 OBSTRUCTIVE SLEEP APNEA: Status: RESOLVED | Noted: 2024-03-20 | Resolved: 2024-06-24

## 2024-06-24 PROCEDURE — 99213 OFFICE O/P EST LOW 20 MIN: CPT | Performed by: STUDENT IN AN ORGANIZED HEALTH CARE EDUCATION/TRAINING PROGRAM

## 2024-06-24 NOTE — TELEPHONE ENCOUNTER
Called patient and reviewed that order is already in. Order printed and faxed to  DME supply store in [place]. Patient is interested in home delivery if able.

## 2024-06-24 NOTE — PROGRESS NOTES
[facility]  Sleep Study Follow-Up Visit:    Date on this visit: 6/24/2024    Primary Physician: Mari Ford     History of present illness:  Ayesha Casas is a 33 year old female patient with DIVYA, ADHD, Asthma, insomnia, Nightmare disorder, and alcohol use disorder in remission who comes in today for follow-up of Her sleep study done on 04/16/24 at the [facility] for possible sleep apnea.    Sleep study showed no clinically significant obstructive sleep apnea. Sleep related hypoxemia was not present.    These findings were reviewed with patient and her mother and grandmother.     Assessment and Plan:  Problem List Items Addressed This Visit       Insomnia, unspecified type - Primary     Insomnia is likely multifactorial and possibly due to psychophysiological, circadian rhythm sleep wake disorder, paradoxical, anxiety/depression, undiagnosed LYNNETTE, inadequate sleep hygiene, and/or lack of appropriate stimulus control  Encouraged to follow good sleep hygiene/behavioral techniques.  Encouraged patient to establish winding down routine prior to bedtime that includes constructive worry and relaxation techniques  HST was negative for LYNNETTE, recommending CBT-I as she reports that her anxiety and constant mind racing are what impact her sleep the most          Relevant Orders    Behavioral Sleep Medicine  Referral    Primary snoring     Sleep study showed no clinically significant obstructive sleep apnea. Sleep related hypoxemia was not present.  Additional findings from the sleep study include AHI 3.2, supine AHI 12.1, and 0 minutes with oxygen saturation less than 88%.   Following discussion with patient a shared decision was made to begin therapy with  CBT-I .              SCALES:  EPWORTH SLEEPINESS SCALE       3/20/2024     2:00 PM    [place] Sleepiness Scale ( GLORIA Eduardo  [id number]<br>ESS - [place]/English - Final version - 21 Nov 07 - [facility] [place].)    Sitting and reading Moderate chance of dozing   Watching TV Moderate chance of dozing   Sitting, inactive in a public place (e.g. a theatre or a meeting) Moderate chance of dozing   As a passenger in a car for an hour without a break Slight chance of dozing   Lying down to rest in the afternoon when circumstances permit Slight chance of dozing   Sitting and talking to someone Would never doze   Sitting quietly after a lunch without alcohol Would never doze   In a car, while stopped for a few minutes in traffic Would never doze   [place] Score (MC) 8   [place] Score (Sleep) 8       INSOMNIA SEVERITY INDEX (TERESA)        3/20/2024     1:42 PM   Insomnia Severity Index (TERESA)   Difficulty falling asleep 3   Difficulty staying asleep 2   Problems waking up too early 4   How SATISFIED/DISSATISFIED are you with your CURRENT sleep pattern? 4   How NOTICEABLE to others do you think your sleep problem is in terms of impairing the quality of your life? 4   How WORRIED/DISTRESSED are you about your current sleep problem? 4   To what extent do you consider your sleep problem to INTERFERE with your daily functioning (e.g. daytime fatigue, mood, ability to function at work/daily chores, concentration, memory, mood, etc.) CURRENTLY? 4   TERESA Total Score 25     Guidelines for Scoring/Interpretation:  Total score categories:  0-7 = No clinically significant insomnia   8-14 = Subthreshold insomnia   15-21 = Clinical insomnia (moderate severity)  22-28 = Clinical insomnia (severe)  Used via courtesy of www.DemoHireealth.va.gov with permission from Bird Canales PhD., [facility]      Allergies:    Allergies   Allergen Reactions    Penicillins Hives       Medications:    Current Outpatient Medications   Medication Sig Dispense Refill    acetaminophen (TYLENOL) 500 MG tablet Take 1-2 tablets (500-1,000 mg) by mouth every 6 hours as needed for fever or pain 30 tablet 0    albuterol (PROAIR HFA/PROVENTIL HFA/VENTOLIN HFA) 108 (90 Base)  MCG/ACT inhaler Inhale 2 puffs into the lungs every 6 hours as needed for shortness of breath, wheezing or cough For more refills,schedule an appointment 18 g 1    albuterol (PROAIR HFA/PROVENTIL HFA/VENTOLIN HFA) 108 (90 Base) MCG/ACT inhaler Inhale 2 puffs into the lungs every 6 hours 6.7 g 0    diclofenac (VOLTAREN) 1 % topical gel Apply 2 g topically 4 times daily 50 g 0    traZODone (DESYREL) 50 MG tablet Take 1 tablet (50 mg) by mouth at bedtime 30 tablet 1    triamcinolone (KENALOG) 0.1 % external cream Apply sparingly to affected area three times daily for 14 days. 30 g 0    amLODIPine (NORVASC) 5 MG tablet Take 5 mg by mouth daily (Patient not taking: Reported on 3/20/2024)      cyclobenzaprine (FLEXERIL) 5 MG tablet Take 1 tablet (5 mg) by mouth 3 times daily as needed for muscle spasms (Patient not taking: Reported on 6/24/2024) 30 tablet 0    escitalopram (LEXAPRO) 5 MG tablet Take 1 tablet (5 mg) by mouth daily (Patient not taking: Reported on 3/20/2024) 30 tablet 1    lidocaine (LIDODERM) 5 % patch Place 1 patch onto the skin every 24 hours To prevent lidocaine toxicity, patient should be patch free for 12 hrs daily. (Patient not taking: Reported on 6/24/2024) 10 patch 0    LORazepam (ATIVAN) 0.5 MG tablet Take 1 tablet (0.5 mg) by mouth as needed for anxiety (prior to MRI) Bring to MRI appointment 3 tablet 0    prazosin (MINIPRESS) 1 MG capsule  (Patient not taking: Reported on 6/24/2024)  0    vitamin D2 (ERGOCALCIFEROL) 65510 units (1250 mcg) capsule TAKE 1 CAPSULE BY MOUTH 1 TIME A WEEK (Patient not taking: Reported on 3/20/2024) 12 capsule 1    ZOLMitriptan (ZOMIG) 5 MG tablet Take 1 tablet (5 mg) by mouth at onset of headache for migraine May repeat in 2 hours. Max 2 tablets/24 hours. (Patient not taking: Reported on 6/24/2024) 18 tablet 3    zolpidem (AMBIEN) 5 MG tablet Take tablet by mouth 15 minutes prior to sleep, for Sleep Study 1 tablet 0       Problem List:  Patient Active Problem List     Diagnosis Date Noted    Primary snoring 06/24/2024     Priority: Medium    Insomnia, unspecified type 03/20/2024     Priority: Medium    Vitamin D deficiency 10/01/2018     Priority: Medium    Obesity, Class I, BMI 30-34.9 03/07/2018     Priority: Medium    Intermittent asthma 07/20/2017     Priority: Medium    Adjustment disorder with anxious mood 04/25/2017     Priority: Medium    Borderline personality disorder (H) 04/25/2017     Priority: Medium    Tobacco use disorder 12/19/2012     Priority: Medium    Anxiety 09/23/2011     Priority: Medium    CARDIOVASCULAR SCREENING; LDL GOAL LESS THAN 160 10/31/2010     Priority: Medium        Past Medical/Surgical History:  Past Medical History:   Diagnosis Date    ADHD (attention deficit hyperactivity disorder) 01/01/1997    resolved    Alcohol use disorder, moderate, dependence (H)     Anxiety 01/01/2003    Fibrocystic breast changes 01/01/2010    PTSD (post-traumatic stress disorder) 01/01/2003    Severe persistent asthma (H28) 01/01/2010    Tobacco use disorder      Past Surgical History:   Procedure Laterality Date    NO HISTORY OF SURGERY         Social History:  Social History     Socioeconomic History    Marital status: Single     Spouse name: Wilbert     Number of children: 0    Years of education: Not on file    Highest education level: Not on file   Occupational History    Occupation: PCA     Employer: [employer]     Comment: part time    Occupation: student     Comment: general prerequisites before [employer]    Occupation: snow shoveling service     Comment: part time   Tobacco Use    Smoking status: Some Days     Types: Cigars    Smokeless tobacco: Never   Vaping Use    Vaping status: Not on file   Substance and Sexual Activity    Alcohol use: Yes     Comment: beer 2/d    Drug use: Yes     Types: Marijuana    Sexual activity: Yes     Partners: Female     Birth control/protection: Injection   Other Topics Concern    Parent/sibling w/ CABG, MI or  angioplasty before 65F 55M? No   Social History Narrative    Lives alone in [facility] -  on disability      Social Determinants of Health     Financial Resource Strain: Low Risk  (11/17/2023)    Financial Resource Strain     Within the past 12 months, have you or your family members you live with been unable to get utilities (heat, electricity) when it was really needed?: No   Food Insecurity: Low Risk  (11/17/2023)    Food Insecurity     Within the past 12 months, did you worry that your food would run out before you got money to buy more?: No     Within the past 12 months, did the food you bought just not last and you didn t have money to get more?: No   Transportation Needs: Low Risk  (11/17/2023)    Transportation Needs     Within the past 12 months, has lack of transportation kept you from medical appointments, getting your medicines, non-medical meetings or appointments, work, or from getting things that you need?: No   Physical Activity: Not on file   Stress: Not on file   Social Connections: Not on file   Interpersonal Safety: Low Risk  (11/17/2023)    Interpersonal Safety     Do you feel physically and emotionally safe where you currently live?: Yes     Within the past 12 months, have you been hit, slapped, kicked or otherwise physically hurt by someone?: No     Within the past 12 months, have you been humiliated or emotionally abused in other ways by your partner or ex-partner?: No   Housing Stability: Low Risk  (11/17/2023)    Housing Stability     Do you have housing? : Yes     Are you worried about losing your housing?: No       Family History:  Family History   Problem Relation Age of Onset    Cancer Father     Hypertension Maternal Grandmother     Diabetes Maternal Grandfather     Cancer - colorectal Maternal Grandfather     Breast Cancer Maternal Aunt        Review of systems  A complete review of systems reviewed by me is negative with the exeption of what has been mentioned in the history of present  "illness.    Physical Examination:  Vitals: BP (!) 124/92   Pulse 84   Resp 18   Ht 1.47 m (4' 9.87\")   Wt 72.6 kg (160 lb 1.6 oz)   LMP 05/10/2024 (Approximate)   SpO2 100%   BMI 33.61 kg/m    BMI= Body mass index is 33.61 kg/m .     GENERAL: alert and no distress  EYES: Eyes grossly normal to inspection.  No discharge or erythema, or obvious scleral/conjunctival abnormalities.  RESP: No audible wheeze, cough, or visible cyanosis.    SKIN: Visible skin clear. No significant rash, abnormal pigmentation or lesions.  NEURO: Cranial nerves grossly intact.  Mentation and speech appropriate for age.  PSYCH: Appropriate affect, tone, and pace of words          Other tests/labs:   I have reviewed the labs and personally reviewed the imaging below and made my comment in the assessment and plan.      Patient was strongly advised to avoid driving, operating any heavy machinery or other hazardous situations while drowsy or sleepy.  Patient was counseled on the importance of driving while alert, to pull over if drowsy, or nap before getting into the vehicle if sleepy.      Plan is for Ayesha Casas to follow up PRN.     The above note was dictated using voice recognition software. Although reviewed after completion, some word and grammatical error may remain . Please contact the author for any clarifications.    Total time spent reviewing medical records, history and physical examination, review of previous testing and interpretation as well as documentation on this date, 06/24/24: 20 minutes    Will Dodson MD on 10/20/2022   [facility]   [address], [address]   [address] [place] [address]. [place] [address]   Appointments: [phone]     CC: Will Dodson    "

## 2024-06-24 NOTE — ASSESSMENT & PLAN NOTE
Sleep study showed no clinically significant obstructive sleep apnea. Sleep related hypoxemia was not present.  Additional findings from the sleep study include AHI 3.2, supine AHI 12.1, and 0 minutes with oxygen saturation less than 88%.   Following discussion with patient a shared decision was made to begin therapy with  CBT-I .

## 2024-06-24 NOTE — PATIENT INSTRUCTIONS
Yung Jacinto, DBSM, CBSM    Sleep Medicine, Psychiatry, Addiction Medicine    Locations:    [facility]    [address] [address]    [facility]    [address] [address]    To schedule visit please call: [phone]

## 2024-06-24 NOTE — ASSESSMENT & PLAN NOTE
Insomnia is likely multifactorial and possibly due to psychophysiological, circadian rhythm sleep wake disorder, paradoxical, anxiety/depression, undiagnosed LYNNTETE, inadequate sleep hygiene, and/or lack of appropriate stimulus control  Encouraged to follow good sleep hygiene/behavioral techniques.  Encouraged patient to establish winding down routine prior to bedtime that includes constructive worry and relaxation techniques  HST was negative for LYNNETTE, recommending CBT-I as she reports that her anxiety and constant mind racing are what impact her sleep the most

## 2024-07-19 DIAGNOSIS — G47.00 INSOMNIA, UNSPECIFIED TYPE: ICD-10-CM

## 2024-07-22 RX ORDER — TRAZODONE HYDROCHLORIDE 50 MG/1
50 TABLET, FILM COATED ORAL AT BEDTIME
Qty: 30 TABLET | Refills: 1 | Status: SHIPPED | OUTPATIENT
Start: 2024-07-22

## 2024-07-22 NOTE — TELEPHONE ENCOUNTER
Last Clinic Visit: 6/10/2024 [facility]    *plan last visit patient to f/u with Sleep Study Clinic for insomnia  - Rx passed protocol  - sent as last ordered Disp:30 R:1

## 2024-08-09 ENCOUNTER — TELEPHONE (OUTPATIENT)
Dept: NEUROLOGY | Facility: CLINIC | Age: 34
End: 2024-08-09
Payer: MEDICARE

## 2024-08-09 NOTE — TELEPHONE ENCOUNTER
Attempted to reach patient to remind them about video appointment scheduled with RAJ GOLDSTEIN PA-C on 8/12/24.No answer, no option to leave VM.

## 2024-08-12 ENCOUNTER — VIRTUAL VISIT (OUTPATIENT)
Dept: NEUROLOGY | Facility: CLINIC | Age: 34
End: 2024-08-12
Payer: MEDICARE

## 2024-08-12 VITALS — HEIGHT: 58 IN | WEIGHT: 156 LBS | BODY MASS INDEX: 32.75 KG/M2

## 2024-08-12 DIAGNOSIS — G43.709 CHRONIC MIGRAINE WITHOUT AURA WITHOUT STATUS MIGRAINOSUS, NOT INTRACTABLE: Primary | ICD-10-CM

## 2024-08-12 PROCEDURE — 99213 OFFICE O/P EST LOW 20 MIN: CPT | Mod: 95

## 2024-08-12 PROCEDURE — G2211 COMPLEX E/M VISIT ADD ON: HCPCS | Mod: 95

## 2024-08-12 RX ORDER — ZOLMITRIPTAN 5 MG/1
5 TABLET, FILM COATED ORAL
Qty: 18 TABLET | Refills: 3 | Status: SHIPPED | OUTPATIENT
Start: 2024-08-12

## 2024-08-12 RX ORDER — ATENOLOL 25 MG/1
25 TABLET ORAL DAILY
Qty: 60 TABLET | Refills: 3 | Status: SHIPPED | OUTPATIENT
Start: 2024-08-12

## 2024-08-12 ASSESSMENT — HEADACHE IMPACT TEST (HIT 6)
HOW OFTEN DID HEADACHS LIMIT CONCENTRATION ON WORK OR DAILY ACTIVITY: VERY OFTEN
WHEN YOU HAVE A HEADACHE HOW OFTEN DO YOU WISH YOU COULD LIE DOWN: VERY OFTEN
HOW OFTEN DO HEADACHES LIMIT YOUR DAILY ACTIVITIES: VERY OFTEN
HOW OFTEN HAVE YOU FELT TOO TIRED TO WORK BECAUSE OF YOUR HEADACHES: VERY OFTEN
HOW OFTEN HAVE YOU FELT FED UP OR IRRITATED BECAUSE OF YOUR HEADACHES: VERY OFTEN
WHEN YOU HAVE HEADACHES HOW OFTEN IS THE PAIN SEVERE: ALWAYS
HIT6 TOTAL SCORE: 68

## 2024-08-12 ASSESSMENT — MIGRAINE DISABILITY ASSESSMENT (MIDAS)
ON A SCALE FROM 0-10 ON AVERAGE HOW PAINFUL WERE HEADACHES: 4
HOW MANY DAYS WAS YOUR PRODUCTIVITY CUT IN HALF BECAUSE OF HEADACHES: 3
HOW MANY DAYS IN THE PAST 3 MONTHS HAVE YOU HAD A HEADACHE: 3
HOW OFTEN WERE SOCIAL ACTIVITIES MISSED DUE TO HEADACHES: 2
HOW MANY DAYS DID YOU MISS WORK OR SCHOOL BECAUSE OF HEADACHES: 2
HOW MANY DAYS DID YOU NOT DO HOUSEWORK BECAUSE OF HEADACHES: 3
HOW MANY DAYS WAS HOUSEWORK PRODUCTIVITY CUT IN HALF DUE TO HEADACHES: 4
TOTAL SCORE: 14

## 2024-08-12 ASSESSMENT — PAIN SCALES - GENERAL: PAINLEVEL: NO PAIN (0)

## 2024-08-12 NOTE — NURSING NOTE
Current patient location:  [facility]    Is the patient currently in the state of [place]? YES    Visit mode:VIDEO    If the visit is dropped, the patient can be reconnected by: VIDEO VISIT: Send to e-mail at: [email or web address]    Will anyone else be joining the visit? NO  (If patient encounters technical issues they should call [phone]    How would you like to obtain your AVS? MyChart    Are changes needed to the allergy or medication list? Pt stated no changes to allergies and Pt stated no med changes    Are refills needed on medications prescribed by this physician? NO    Rooming Documentation:  Not applicable      Reason for visit: KEELEY MATTHEWSF

## 2024-08-12 NOTE — PROGRESS NOTES
Virtual Visit Details    Type of service:  Video Visit   Video Start Time:  11:35 AM  Video End Time:11:46 AM    Originating Location (pt. Location): Home    Distant Location (provider location):  Off-site  Platform used for Video Visit: [facility]    Headache Neurology Progress Note    August 12, 2024    Assessment and Plan:   Ayesha is a 33 year old female who presents for follow up of chronic migraine without aura.     We discussed the following treatment strategy:  - For acute treatment of mild headache, she may use acetaminophen as needed, not to exceed 14 days per month to avoid medication overuse. Reviewed risk of medication overuse.   - For acute treatment of moderate to severe headache, she may use zolmitriptan 5 mg tablet taken at onset of headache with a repeat dose taken after 2 hours if needed. Use should not exceed 9 days per month to avoid medication overuse.    Her current frequency and severity of headaches warrant prevention.  - Recommend she continue with botulinum toxin injections 150 units every 12 weeks as this has been helpful for reducing headache frequency and severity by at least 50%.   - Recommend the addition of atenolol for additional headache prophylaxis. She will start with 25 mg daily. If tolerating well, can increase to 50 mg daily. Side effects reviewed.   - Alternatively, consider topiramate (she is not too interested in this due to side effect of paresthesias), gabapentin, or CGRP inhibitors.     The longitudinal plan of care for the diagnosis(es)/condition(s) as documented were addressed during this visit. Due to the added complexity in care, I will continue to support Ayesha in the subsequent management and with ongoing continuity of care.     Follow up in 6-12 months.     Nancy Babcock PA-C  Headache Neurology  [facility]iss      Subjective:    Ayesha presents for follow up of chronic migraine without aura.     Her  baseline headaches are throbbing pain at bilateral temples or forehead. She has associated nausea, paresthesias of both hands, photophobia and osmophobia. Headaches last 4 hours if untreated and can reach a 10/10 in severity.  Her baseline headache frequency is 30/30 headache days per month with 8+/30 severe headache days per month.     Ayesha currently reports 15/30 headache days per month, with 10/30 severe headache days per month.     Botox is helpful but she gets some early wearing off effect, still gets fairly frequent headaches. Insurance denied 200 units.     She will use acetaminophen and zolmitriptan as needed. Zolmitriptan causes some tingling sensation but is effective.     She notes some occasional tingling in her hands at times.    Notices lightheadedness that starts shortly after Botox injections which improves once she gets home.       Current headache treatments:  Acute therapies:  - Acetaminophen - somewhat effective   - Zolmitriptan 5 mg tablet - effective, tingling     Preventative therapies:  - Botox 150 units every 12 weeks - effective  - Escitalopram 5 mg (anxiety)  - Trazodone 50 mg (insomnia)     Supportive therapies:     Previous treatments tried:  Acute therapies:  - Ibuprofen - helpful but was using too often   - Aspirin  - Excedrin - not effective  - Rizatriptan - tingling      Preventative therapies:  - Sertraline - tried for mood, made her feel funny   - Nortriptyline (insomnia) - not effective for insomnia and didn't note any improvement in headaches  - Buspirone   - Fluoxetine     Supportive therapies:        3/14/2023     9:20 AM 8/12/2024    10:55 AM   HIT-6   When you have headaches, how often is the pain severe 13 13   How often do headaches limit your ability to do usual daily activities including household work, work, school, or social activities? 13 11   When you have a headache, how often do you wish you could lie down? 11 11   In the past 4 weeks, how often have you felt too  "tired to do work or daily activities because of your headaches 11 11   In the past 4 weeks, how often have you felt fed up or irritated because of your headaches 11 11   In the past 4 weeks, how often did headaches limit your ability to concentrate on work or daily activities 11 11   HIT-6 Total Score 70 68           8/12/2024    10:57 AM   MIDAS - in the past three months:   On how many days did you miss work or school because of your headaches? 2   How many days was your productivity at work or school reduced by half or more because of your headaches? 3   On how many days did you not do household work because of your headaches? 3   How many days was your productivity in household work reduced by half or more because of your headaches? 4   On how many days did you miss family, social, or leisure activities because of your headaches? 2   On how many days did you have a headache? 3   On a scale of 0-10, on average how painful were these headaches? 4   MIDAS Score 14 (III - Moderate Disability)        Objective:    Vitals: Ht 1.461 m (4' 9.5\")   Wt 70.8 kg (156 lb)   BMI 33.17 kg/m    General: Cooperative, NAD  Neurologic Exam:  Mental Status: Fully alert, attentive and oriented. Speech is clear and fluent.   Cranial Nerves: Facial movements symmetric.   Motor: No abnormal movements.      "

## 2024-08-12 NOTE — LETTER
8/12/2024      Ayesha Casas  [address]  [place] [address]      Dear Colleague,    Thank you for referring your patient, Ayesha Casas, to the [facility] NEUROLOGY CLINICS [facility]. Please see a copy of my visit note below.    Virtual Visit Details    Type of service:  Video Visit   Video Start Time:  11:35 AM  Video End Time:11:46 AM    Originating Location (pt. Location): Home    Distant Location (provider location):  Off-site  Platform used for Video Visit: [facility]    Headache Neurology Progress Note    August 12, 2024    Assessment and Plan:   Ayesha is a 33 year old female who presents for follow up of chronic migraine without aura.     We discussed the following treatment strategy:  - For acute treatment of mild headache, she may use acetaminophen as needed, not to exceed 14 days per month to avoid medication overuse. Reviewed risk of medication overuse.   - For acute treatment of moderate to severe headache, she may use zolmitriptan 5 mg tablet taken at onset of headache with a repeat dose taken after 2 hours if needed. Use should not exceed 9 days per month to avoid medication overuse.    Her current frequency and severity of headaches warrant prevention.  - Recommend she continue with botulinum toxin injections 150 units every 12 weeks as this has been helpful for reducing headache frequency and severity by at least 50%.   - Recommend the addition of atenolol for additional headache prophylaxis. She will start with 25 mg daily. If tolerating well, can increase to 50 mg daily. Side effects reviewed.   - Alternatively, consider topiramate (she is not too interested in this due to side effect of paresthesias), gabapentin, or CGRP inhibitors.     The longitudinal plan of care for the diagnosis(es)/condition(s) as documented were addressed during this visit. Due to the added complexity in care, I will continue to support Ayesha in the  subsequent management and with ongoing continuity of care.     Follow up in 6-12 months.     Nancy Babcock PA-C  Headache Neurology  [facility] Neurology [facility]      Subjective:    Ayesha presents for follow up of chronic migraine without aura.     Her baseline headaches are throbbing pain at bilateral temples or forehead. She has associated nausea, paresthesias of both hands, photophobia and osmophobia. Headaches last 4 hours if untreated and can reach a 10/10 in severity.  Her baseline headache frequency is 30/30 headache days per month with 8+/30 severe headache days per month.     Ayesha currently reports 15/30 headache days per month, with 10/30 severe headache days per month.     Botox is helpful but she gets some early wearing off effect, still gets fairly frequent headaches. Insurance denied 200 units.     She will use acetaminophen and zolmitriptan as needed. Zolmitriptan causes some tingling sensation but is effective.     She notes some occasional tingling in her hands at times.    Notices lightheadedness that starts shortly after Botox injections which improves once she gets home.       Current headache treatments:  Acute therapies:  - Acetaminophen - somewhat effective   - Zolmitriptan 5 mg tablet - effective, tingling     Preventative therapies:  - Botox 150 units every 12 weeks - effective  - Escitalopram 5 mg (anxiety)  - Trazodone 50 mg (insomnia)     Supportive therapies:     Previous treatments tried:  Acute therapies:  - Ibuprofen - helpful but was using too often   - Aspirin  - Excedrin - not effective  - Rizatriptan - tingling      Preventative therapies:  - Sertraline - tried for mood, made her feel funny   - Nortriptyline (insomnia) - not effective for insomnia and didn't note any improvement in headaches  - Buspirone   - Fluoxetine     Supportive therapies:        3/14/2023     9:20 AM 8/12/2024    10:55 AM   HIT-6   When you have headaches, how often is the pain severe 13 13   How  "often do headaches limit your ability to do usual daily activities including household work, work, school, or social activities? 13 11   When you have a headache, how often do you wish you could lie down? 11 11   In the past 4 weeks, how often have you felt too tired to do work or daily activities because of your headaches 11 11   In the past 4 weeks, how often have you felt fed up or irritated because of your headaches 11 11   In the past 4 weeks, how often did headaches limit your ability to concentrate on work or daily activities 11 11   HIT-6 Total Score 70 68           8/12/2024    10:57 AM   MIDAS - in the past three months:   On how many days did you miss work or school because of your headaches? 2   How many days was your productivity at work or school reduced by half or more because of your headaches? 3   On how many days did you not do household work because of your headaches? 3   How many days was your productivity in household work reduced by half or more because of your headaches? 4   On how many days did you miss family, social, or leisure activities because of your headaches? 2   On how many days did you have a headache? 3   On a scale of 0-10, on average how painful were these headaches? 4   MIDAS Score 14 (III - Moderate Disability)        Objective:    Vitals: Ht 1.461 m (4' 9.5\")   Wt 70.8 kg (156 lb)   BMI 33.17 kg/m    General: Cooperative, NAD  Neurologic Exam:  Mental Status: Fully alert, attentive and oriented. Speech is clear and fluent.   Cranial Nerves: Facial movements symmetric.   Motor: No abnormal movements.        Again, thank you for allowing me to participate in the care of your patient.        Sincerely,        RAJ GOLDSTEIN PA-C  "

## 2024-08-14 ENCOUNTER — TELEPHONE (OUTPATIENT)
Dept: OBGYN | Facility: CLINIC | Age: 34
End: 2024-08-14
Payer: MEDICARE

## 2024-08-15 ENCOUNTER — OFFICE VISIT (OUTPATIENT)
Dept: OBGYN | Facility: CLINIC | Age: 34
End: 2024-08-15
Attending: MIDWIFE
Payer: MEDICARE

## 2024-08-15 VITALS
HEART RATE: 93 BPM | SYSTOLIC BLOOD PRESSURE: 143 MMHG | HEIGHT: 58 IN | DIASTOLIC BLOOD PRESSURE: 87 MMHG | BODY MASS INDEX: 33.17 KG/M2

## 2024-08-15 DIAGNOSIS — R30.0 DYSURIA: Primary | ICD-10-CM

## 2024-08-15 DIAGNOSIS — Z30.42 DEPO-PROVERA CONTRACEPTIVE STATUS: ICD-10-CM

## 2024-08-15 PROCEDURE — 99203 OFFICE O/P NEW LOW 30 MIN: CPT | Performed by: MIDWIFE

## 2024-08-15 PROCEDURE — 96372 THER/PROPH/DIAG INJ SC/IM: CPT | Performed by: FAMILY MEDICINE

## 2024-08-15 PROCEDURE — 87086 URINE CULTURE/COLONY COUNT: CPT | Performed by: MIDWIFE

## 2024-08-15 PROCEDURE — 250N000011 HC RX IP 250 OP 636: Performed by: FAMILY MEDICINE

## 2024-08-15 PROCEDURE — G0463 HOSPITAL OUTPT CLINIC VISIT: HCPCS | Mod: 25 | Performed by: MIDWIFE

## 2024-08-15 RX ADMIN — MEDROXYPROGESTERONE ACETATE 150 MG: 150 INJECTION, SUSPENSION INTRAMUSCULAR at 14:43

## 2024-08-15 NOTE — LETTER
8/15/2024       RE: Ayesha Casas  [address]  [place] [address]     Dear Colleague,    Thank you for referring your patient, Ayesha Casas, to the [facility] WOMEN'S CLINIC [place] [facility]. Please see a copy of my visit note below.    SUBJECTIVE: Ayesha Casas is a 33 year old female who complains of urinary frequency, no  dysuria occ slight odor noted when used new soap  declines any vaginal sx  declines vaginal swab for vaginitis or STI screening had these recently completed  DENIES  flank pain, fever, chills, or abnormal vaginal discharge or bleeding.     OBJECTIVE: Appears well, in no apparent distress.  Vital signs are normal. The abdomen is soft without tenderness, guarding, mass, rebound or organomegaly. No CVA tenderness or inguinal adenopathy noted. Urine dipstick shows trace blood otherwise negative .      ASSESSMENT:  urinary odor intermittent       PLAN: await UC     Symptoms not significant minimal today   Depo provera injection 150 mg today   Charu COURTNEY       Again, thank you for allowing me to participate in the care of your patient.      Sincerely,    ROZ Alcantar CNM

## 2024-08-15 NOTE — NURSING NOTE
Clinic Administered Medication Documentation      Depo Provera Documentation    Depo-Provera Standing Order inclusion/exclusion criteria reviewed.     Is this the initial or subsequent dose of Depo Provera? Subsequent dose - patient is within the acceptable window of time (11-15 weeks) for subsequent injection. Pregnancy test not indicated.    Patient meets: inclusion criteria     Is there an active order (written within the past 365 days, with administrations remaining, not ) in the chart? Yes.     Prior to injection, verified patient identity using patient's name and date of birth. Medication was administered. Please see MAR and medication order for additional information.     Vial/Syringe: Single dose vial. Was entire vial of medication used? Yes    Patient instructed to remain in clinic for 15 minutes and report any adverse reaction to staff immediately.  NEXT INJECTION DUE: 10/31/24 - 24    Verified that the patient has refills remaining in their prescription.

## 2024-08-15 NOTE — PATIENT INSTRUCTIONS
Thank you for trusting us with your care!   Please be aware, if you are on Mychart, you may see your results prior to your providers review. If labs are abnormal, we will call or message you on MobSt[employer] with a follow up plan.    If you need to contact us for questions about:  Symptoms, Scheduling & Medical Questions; Non-urgent (2-3 day response) TenderTree message, Urgent (needing response today) [phone] (if after 3:30pm next day response)   Prescriptions: Please call your Pharmacy   Billing: [place] [phone] or [facility]:[phone]

## 2024-08-15 NOTE — PROGRESS NOTES
SUBJECTIVE: Ayesha Casas is a 33 year old female who complains of urinary frequency, no  dysuria occ slight odor noted when used new soap  declines any vaginal sx  declines vaginal swab for vaginitis or STI screening had these recently completed  DENIES  flank pain, fever, chills, or abnormal vaginal discharge or bleeding.     OBJECTIVE: Appears well, in no apparent distress.  Vital signs are normal. The abdomen is soft without tenderness, guarding, mass, rebound or organomegaly. No CVA tenderness or inguinal adenopathy noted. Urine dipstick shows trace blood otherwise negative .      ASSESSMENT:  urinary odor intermittent       PLAN: await UC     Symptoms not significant minimal today   Depo provera injection 150 mg today   Charu Murray CNM APRN

## 2024-08-16 LAB — BACTERIA UR CULT: NORMAL

## 2024-08-26 ENCOUNTER — ALLIED HEALTH/NURSE VISIT (OUTPATIENT)
Dept: FAMILY MEDICINE | Facility: CLINIC | Age: 34
End: 2024-08-26
Payer: MEDICARE

## 2024-08-26 DIAGNOSIS — Z11.1 SCREENING EXAMINATION FOR PULMONARY TUBERCULOSIS: Primary | ICD-10-CM

## 2024-08-26 PROCEDURE — 86580 TB INTRADERMAL TEST: CPT

## 2024-08-26 PROCEDURE — 99207 PR NO CHARGE NURSE ONLY: CPT

## 2024-08-26 NOTE — PROGRESS NOTES
"Patient is here today for a Mantoux (TST) test placement.    Is there a current order in the chart? No. Placed order according to standing order (reference the \"Skin Test- Tuberculosis Screening- Ambulatory Care\" standing order in Policy Tech). Review the Inclusion and Exclusion Criteria.    Placed in RIGHT forearm at 10:40 AM on 8/26/2024.     Inclusion Criteria  Pre-employment screening for healthcare workers and correctional facility staff - Administer two-step TST. Patient to return for second test in 1-3 weeks after first test is read.     Exclusion Criteria  None - Place order for Mantoux (TST) test per standing order.    Reason for Mantoux (TST) in patient's own words: Work    Patient needs form signed? Yes- completed per clinic's forms process.    Instructed patient to wait for 15 minutes post injection and to report any reactions immediately to staff.    Told patient to return to clinic in 48-72 hours to have Mantoux (TST) read. Patient verified that she has an appointment scheduled on 8/28/2024 at 1:30 PM.      Thank you,    GUERO BaN, RN  [facility]    "

## 2024-08-28 ENCOUNTER — OFFICE VISIT (OUTPATIENT)
Dept: FAMILY MEDICINE | Facility: CLINIC | Age: 34
End: 2024-08-28
Payer: MEDICARE

## 2024-08-28 ENCOUNTER — ALLIED HEALTH/NURSE VISIT (OUTPATIENT)
Dept: FAMILY MEDICINE | Facility: CLINIC | Age: 34
End: 2024-08-28
Payer: MEDICARE

## 2024-08-28 DIAGNOSIS — R76.11 POSITIVE SKIN TEST FOR TUBERCULOSIS: Primary | ICD-10-CM

## 2024-08-28 DIAGNOSIS — R76.11 POSITIVE SKIN TEST FOR TUBERCULOSIS: ICD-10-CM

## 2024-08-28 LAB
PPDINDURATION: 5 MM (ref 0–4.99)
PPDREDNESS: PRESENT

## 2024-08-28 PROCEDURE — 86481 TB AG RESPONSE T-CELL SUSP: CPT

## 2024-08-28 PROCEDURE — 99207 PR NO CHARGE NURSE ONLY: CPT

## 2024-08-28 PROCEDURE — 36415 COLL VENOUS BLD VENIPUNCTURE: CPT

## 2024-08-28 PROCEDURE — 99213 OFFICE O/P EST LOW 20 MIN: CPT

## 2024-08-28 NOTE — PROGRESS NOTES
Assessment & Plan     Positive skin test for tuberculosis  - Quantiferon TB Gold Plus    Subjective   Ayesha is a 34 year old, presenting for the following health issues:  Blood Draw (Tb Blood test needed. )      8/28/2024     2:05 PM   Additional Questions   Roomed by Gurmeet MARTINEZ         8/28/2024   Forms   Any forms needing to be completed Yes     Called in for MA visit for elevated skin lesion following TB test. Works as PCA. No known exposure to tuberculosis.  Has not received BCG vaccine.  No night sweats, weight loss, bloody cough, shortness of breath. No travel    HPI           Objective    There were no vitals taken for this visit.  There is no height or weight on file to calculate BMI.  Physical Exam   GENERAL: alert and no distress  SKIN: Skin elevation 2 cm, erythematous on right forearm            Signed Electronically by: Nahum Tran NP

## 2024-08-28 NOTE — PROGRESS NOTES
Patient is here today for a Mantoux (TST) test results.    Did patient return to clinic 48-72 hours from Mantoux (TST) placement: Yes -     PPD Induration   Date Value Ref Range Status   08/28/2024 5 (A) 0 - 4.99 mm Final     PPD Redness   Date Value Ref Range Status   08/28/2024 Present  Final     Induration Size? Induration <5mm - Enter results in Enter/Edit Activity. Route results to ordering provider.     Patient needs form signed? Yes. Follow clinic form process.  Pt will receive blood test results and will ask for result letter if indicated for her work.     Patient reports having previously had the BCG Vaccine: No.    Does patient need a two step? Yes, but will have blood test ordered instead d/t positive mantoux skin tuberculin test today.     Pt skin test read today was positive. Consulted Nahum Tran DNP and confirmed positive skin test. Pt denies any symptoms of SOB, cough, cough with blood, fatigue, unintentional weight loss, fever, or night sweats. Pt states she was [place] in the [place]. Pt has not received the BCG vaccine. Pt has not travelled outside of the country recently.  Pt states she has had a similar skin reaction during her last mantoux placement about 6 years ago that required her to instead get the blood test done. Assisted pt with form and told her that she would need to communicate with her regarding the blood test results.     Pt then had an office visit with the provider where the QUANTIFERON TB GOLD blood test was ordered. Discussed with pt that they will be informed with their test results and any necessary next steps. Provider explained to pt the common symptoms of active TB, of which pt had no symptoms. Pt verbalized understanding and agreed to adhere to this plan.       Thank you,    Gurmeet DESAI, BSN, RN  [facility]

## 2024-08-29 ENCOUNTER — OFFICE VISIT (OUTPATIENT)
Dept: NEUROLOGY | Facility: CLINIC | Age: 34
End: 2024-08-29
Payer: MEDICARE

## 2024-08-29 DIAGNOSIS — G43.709 CHRONIC MIGRAINE WITHOUT AURA WITHOUT STATUS MIGRAINOSUS, NOT INTRACTABLE: Primary | ICD-10-CM

## 2024-08-29 LAB
QUANTIFERON MITOGEN: 10 IU/ML
QUANTIFERON NIL TUBE: 0.08 IU/ML

## 2024-08-29 PROCEDURE — 64615 CHEMODENERV MUSC MIGRAINE: CPT

## 2024-08-29 NOTE — PROGRESS NOTES
[facility]  Botulinum Toxin Procedure    Nancy Babcock PA-C  Headache Neurology    August 29, 2024    Procedure: OnabotulinumtoxinA injections for chronic migraine  Indication: Chronic migraine    Ayesha Casas suffers from severe intractable headaches. She was referred by Nancy Babcock PA-C for onabotulinumtoxinA injections for headache.       At baseline, her headaches are a throbbing pain at bilateral temples or forehead. She has associated nausea, paresthesias of both hands, photophobia and osmophobia. Headaches last 4 hours if untreated and can reach a 10/10 in severity.      Prior to initiation of botulinum toxin injections, Ayesha reported 30/30 headache days per month, with 8+/30 severe headache days per month. Her headaches are quite disabling and often interfere with her ability to function normally.    Their last round of botulinum toxin injections was on 6/6/2024.    Ayesha reports 10-15/30 headache days per month currently, with 4-8/30 severe headache days per month. She has noticed a wearing off phenomenon prior to this round of botulinum toxin injections, lasting 3-4 weeks.     Ayesha reports the following benefits of botulinum toxin injections from their last round: Headache severity is improved so she is more functional on a daily basis.     She has attempted other migraine prophylactic treatments in the past, which have included: sertraline, fluoxetine, buspirone, nortriptyline.     She has atenolol but she has not started yet.     Patient's pain was assessed prior to the procedure. She rated her pain today as 0 out of 10.      The procedure was explained to the patient. Benefits of the treatment were discussed including headache and migraine reduction. Risks of the procedure were reviewed including but not limited to pain, bruising, bleeding, infection, and weakness of muscles injected or those distal to injection sites. Alternatives were discussed. The patient voiced understanding  of the risks and benefits. All questions answered and patient consented to proceed.    Procedural Pause: Procedural pause was conducted to verify correct patient identity, procedure to be performed, correct side and site, correct patient position, and special requirements. Appropriate hand hygiene was utilized, and each injection site was prepped with alcohol wipes or Chloraprep swab.     Procedure Details:   200 units of onabotulinumtoxinA was diluted in 4 mL 0.9% normal saline.   A total of 150 units of onabotulinumtoxinA were injected using 30 gauge 0.5 inch needles into the muscles listed below. 50 units of onabotulinumtoxinA were wasted.     Injection Sites: Total = 150 units onabotulinumtoxinA     Procerus muscles - 5 units into the procerus muscle (5 units total)     muscles - 5 units into the left  muscle and 5 units into the right  muscle (1 injection site per muscle) (10 units total)    Frontalis muscles - 5 units into the left superior frontalis muscle and 5 units into the right superior frontalis muscle (2 injection sites per muscle) (10 units total)    Temporalis muscles - 12.5 units into the left temporalis muscle and 12.5 units into the right temporalis muscle (2 injection sites per muscle) (25 units total)    Occipitalis muscles - 12.5 units into the left occipitalis muscle and 12.5 units into the right occipitalis muscle (2 injection sites per muscle) (25 units total)    Splenius Capitis muscles - 12.5 units into the left splenius capitis muscle and 12.5 units into the right splenius capitis muscle (2 injection sites per muscle, divided into 2/3 anteriorly and 1/3 posteriorly) (25 units total)      Trapezius muscles - 25 units into the left trapezius muscle and 25 units into the right trapezius muscle (3 injection sites per muscle, divided into 5 units, 10 units, 10 units, medial to lateral) (50 units total)      Patient tolerated the procedure well without immediate  complications. She will follow up in clinic for assessment of the effectiveness of treatment. She did not report any change in her pain level after the botulinumtoxinA injection procedure.      Nancy Babcokc PA-C  Headache Neurology  [facility] Neurology [facility]

## 2024-08-29 NOTE — LETTER
8/29/2024      Ayesha Casas  [address]  [place] [address]      Dear Colleague,    Thank you for referring your patient, Ayesha Casas, to the [facility] NEUROLOGY CLINICS [facility]. Please see a copy of my visit note below.    [facility]  Botulinum Toxin Procedure    Nancy Babcock PA-C  Headache Neurology    August 29, 2024    Procedure: OnabotulinumtoxinA injections for chronic migraine  Indication: Chronic migraine    Ayesha Casas suffers from severe intractable headaches. She was referred by Nancy Babcock PA-C for onabotulinumtoxinA injections for headache.       At baseline, her headaches are a throbbing pain at bilateral temples or forehead. She has associated nausea, paresthesias of both hands, photophobia and osmophobia. Headaches last 4 hours if untreated and can reach a 10/10 in severity.      Prior to initiation of botulinum toxin injections, Ayesha reported 30/30 headache days per month, with 8+/30 severe headache days per month. Her headaches are quite disabling and often interfere with her ability to function normally.    Their last round of botulinum toxin injections was on 6/6/2024.    Ayesha reports 10-15/30 headache days per month currently, with 4-8/30 severe headache days per month. She has noticed a wearing off phenomenon prior to this round of botulinum toxin injections, lasting 3-4 weeks.     Ayesha reports the following benefits of botulinum toxin injections from their last round: Headache severity is improved so she is more functional on a daily basis.     She has attempted other migraine prophylactic treatments in the past, which have included: sertraline, fluoxetine, buspirone, nortriptyline.     She has atenolol but she has not started yet.     Patient's pain was assessed prior to the procedure. She rated her pain today as 0 out of 10.      The procedure was explained to the patient. Benefits of the treatment were discussed including  Last OV: 8/8/2023  Next OV: 11/6/2023    Next appointment due:    Last fill:8/8/23  Refills:2 #120 headache and migraine reduction. Risks of the procedure were reviewed including but not limited to pain, bruising, bleeding, infection, and weakness of muscles injected or those distal to injection sites. Alternatives were discussed. The patient voiced understanding of the risks and benefits. All questions answered and patient consented to proceed.    Procedural Pause: Procedural pause was conducted to verify correct patient identity, procedure to be performed, correct side and site, correct patient position, and special requirements. Appropriate hand hygiene was utilized, and each injection site was prepped with alcohol wipes or Chloraprep swab.     Procedure Details:   200 units of onabotulinumtoxinA was diluted in 4 mL 0.9% normal saline.   A total of 150 units of onabotulinumtoxinA were injected using 30 gauge 0.5 inch needles into the muscles listed below. 50 units of onabotulinumtoxinA were wasted.     Injection Sites: Total = 150 units onabotulinumtoxinA     Procerus muscles - 5 units into the procerus muscle (5 units total)     muscles - 5 units into the left  muscle and 5 units into the right  muscle (1 injection site per muscle) (10 units total)    Frontalis muscles - 5 units into the left superior frontalis muscle and 5 units into the right superior frontalis muscle (2 injection sites per muscle) (10 units total)    Temporalis muscles - 12.5 units into the left temporalis muscle and 12.5 units into the right temporalis muscle (2 injection sites per muscle) (25 units total)    Occipitalis muscles - 12.5 units into the left occipitalis muscle and 12.5 units into the right occipitalis muscle (2 injection sites per muscle) (25 units total)    Splenius Capitis muscles - 12.5 units into the left splenius capitis muscle and 12.5 units into the right splenius capitis muscle (2 injection sites per muscle, divided into 2/3 anteriorly and 1/3 posteriorly) (25 units total)      Trapezius  muscles - 25 units into the left trapezius muscle and 25 units into the right trapezius muscle (3 injection sites per muscle, divided into 5 units, 10 units, 10 units, medial to lateral) (50 units total)      Patient tolerated the procedure well without immediate complications. She will follow up in clinic for assessment of the effectiveness of treatment. She did not report any change in her pain level after the botulinumtoxinA injection procedure.      Raj Babcock PA-C  Headache Neurology  [facility] Neurology [facility]      Again, thank you for allowing me to participate in the care of your patient.        Sincerely,        RAJ BABCOCK PA-C

## 2024-08-30 ENCOUNTER — TELEPHONE (OUTPATIENT)
Dept: FAMILY MEDICINE | Facility: CLINIC | Age: 34
End: 2024-08-30
Payer: MEDICARE

## 2024-08-30 LAB
GAMMA INTERFERON BACKGROUND BLD IA-ACNC: 0.08 IU/ML
M TB IFN-G BLD-IMP: POSITIVE
M TB IFN-G CD4+ BCKGRND COR BLD-ACNC: 9.92 IU/ML
MITOGEN IGNF BCKGRD COR BLD-ACNC: 0.71 IU/ML
MITOGEN IGNF BCKGRD COR BLD-ACNC: 0.76 IU/ML
QUANTIFERON TB1 TUBE: 0.84 IU/ML
QUANTIFERON TB2 TUBE: 0.79

## 2024-09-06 ENCOUNTER — VIRTUAL VISIT (OUTPATIENT)
Dept: FAMILY MEDICINE | Facility: CLINIC | Age: 34
End: 2024-09-06
Payer: MEDICARE

## 2024-09-06 DIAGNOSIS — Z51.81 ENCOUNTER FOR THERAPEUTIC DRUG MONITORING: ICD-10-CM

## 2024-09-06 DIAGNOSIS — Z22.7 TB LUNG, LATENT: Primary | ICD-10-CM

## 2024-09-06 PROCEDURE — 99442 PR PHYSICIAN TELEPHONE EVALUATION 11-20 MIN: CPT | Mod: 93

## 2024-09-06 RX ORDER — RIFAMPIN 300 MG/1
600 CAPSULE ORAL DAILY
Qty: 180 CAPSULE | Refills: 1 | Status: SHIPPED | OUTPATIENT
Start: 2024-09-06 | End: 2025-03-05

## 2024-09-06 NOTE — PROGRESS NOTES
Ayesha is a 34 year old who is being evaluated via a billable telephone visit.      Originating Location (pt. Location): Home    Distant Location (provider location):  On-site    Assessment & Plan     TB lung, latent  - rifampin (RIFADIN) 300 MG capsule; Take 2 capsules (600 mg) by mouth daily.  Provided patient with counseling related to rifampin, including watching for hepatotoxicity (appetite changes, nausea, fatigue, jaundice, rash dark urine). Reinforced importance of taking everyday.  Recommend LFTs prior to starting treatment. Recommend 1 month follow-up regarding side effect follow-up, adherence.  No significant med interactions seen (may decrease effectiveness of trazodone).     Encounter for therapeutic drug monitoring  - CBC with platelets; Future  - Hepatic panel (Albumin, ALT, AST, Bili, Alk Phos, TP); Standing      Subjective   Ayesha is a 34 year old, presenting for the following health issues:  No chief complaint on file.  Not sure would.    Alcohol: 1 beer and 1 shot on Mondays and Fridays.     Takes depo for menstrual cycle for heavy flow.  HPI         Objective           Vitals:  No vitals were obtained today due to virtual visit.    Physical Exam   General: Alert and no distress //Respiratory: No audible wheeze, cough, or shortness of breath // Psychiatric:  Appropriate affect, tone, and pace of words          Phone call duration: 20 minutes  Signed Electronically by: Nahum Tran NP

## 2024-09-19 ENCOUNTER — TELEPHONE (OUTPATIENT)
Dept: FAMILY MEDICINE | Facility: CLINIC | Age: 34
End: 2024-09-19
Payer: MEDICARE

## 2024-09-19 DIAGNOSIS — Z51.81 ENCOUNTER FOR THERAPEUTIC DRUG MONITORING: Primary | ICD-10-CM

## 2024-09-19 NOTE — TELEPHONE ENCOUNTER
Pt calling to report some dizziness and nausea in past 24hr.   No other sxs    Pt started rifampin (RIFADIN) 300MG last Wednesday   Taking medication as prescribed, eating about 2 hrs after ingestion. Avoiding alcohol and foods high in tyramine.     Do you want her to come in for hepatic function test sooner?      Please advise   Lora FRYE RN

## 2024-09-19 NOTE — TELEPHONE ENCOUNTER
Called pt to inform her that she can get her hepatic panel done now. She stated she will go to lab tomorrow.     Thank you,    Gurmeet DESAI RN  [facility]

## 2024-09-20 ENCOUNTER — LAB (OUTPATIENT)
Dept: LAB | Facility: CLINIC | Age: 34
End: 2024-09-20
Payer: MEDICARE

## 2024-09-20 ENCOUNTER — OFFICE VISIT (OUTPATIENT)
Dept: FAMILY MEDICINE | Facility: CLINIC | Age: 34
End: 2024-09-20
Payer: MEDICARE

## 2024-09-20 ENCOUNTER — TELEPHONE (OUTPATIENT)
Dept: FAMILY MEDICINE | Facility: CLINIC | Age: 34
End: 2024-09-20

## 2024-09-20 VITALS
TEMPERATURE: 98 F | HEART RATE: 86 BPM | RESPIRATION RATE: 14 BRPM | HEIGHT: 58 IN | DIASTOLIC BLOOD PRESSURE: 88 MMHG | SYSTOLIC BLOOD PRESSURE: 125 MMHG | OXYGEN SATURATION: 100 % | BODY MASS INDEX: 33.17 KG/M2

## 2024-09-20 DIAGNOSIS — Z51.81 ENCOUNTER FOR THERAPEUTIC DRUG MONITORING: ICD-10-CM

## 2024-09-20 DIAGNOSIS — L30.9 DERMATITIS: ICD-10-CM

## 2024-09-20 DIAGNOSIS — L73.9 FOLLICULITIS: ICD-10-CM

## 2024-09-20 DIAGNOSIS — Z23 ENCOUNTER FOR IMMUNIZATION: ICD-10-CM

## 2024-09-20 DIAGNOSIS — M54.6 CHRONIC MIDLINE THORACIC BACK PAIN: ICD-10-CM

## 2024-09-20 DIAGNOSIS — J45.20 MILD INTERMITTENT ASTHMA WITHOUT COMPLICATION: ICD-10-CM

## 2024-09-20 DIAGNOSIS — G89.29 CHRONIC MIDLINE THORACIC BACK PAIN: ICD-10-CM

## 2024-09-20 DIAGNOSIS — I10 PRIMARY HYPERTENSION: Primary | ICD-10-CM

## 2024-09-20 LAB
ALBUMIN SERPL BCG-MCNC: 4.8 G/DL (ref 3.5–5.2)
ALP SERPL-CCNC: 52 U/L (ref 40–150)
ALT SERPL W P-5'-P-CCNC: 20 U/L (ref 0–50)
AST SERPL W P-5'-P-CCNC: 30 U/L (ref 0–45)
BILIRUB DIRECT SERPL-MCNC: <0.2 MG/DL (ref 0–0.3)
BILIRUB SERPL-MCNC: 0.2 MG/DL
ERYTHROCYTE [DISTWIDTH] IN BLOOD BY AUTOMATED COUNT: 11.7 % (ref 10–15)
HCT VFR BLD AUTO: 41.1 % (ref 35–47)
HGB BLD-MCNC: 14.1 G/DL (ref 11.7–15.7)
MCH RBC QN AUTO: 32 PG (ref 26.5–33)
MCHC RBC AUTO-ENTMCNC: 34.3 G/DL (ref 31.5–36.5)
MCV RBC AUTO: 93 FL (ref 78–100)
PLATELET # BLD AUTO: 300 10E3/UL (ref 150–450)
PROT SERPL-MCNC: 7.8 G/DL (ref 6.4–8.3)
RBC # BLD AUTO: 4.41 10E6/UL (ref 3.8–5.2)
WBC # BLD AUTO: 5.8 10E3/UL (ref 4–11)

## 2024-09-20 PROCEDURE — 36415 COLL VENOUS BLD VENIPUNCTURE: CPT

## 2024-09-20 PROCEDURE — 80076 HEPATIC FUNCTION PANEL: CPT

## 2024-09-20 PROCEDURE — 85027 COMPLETE CBC AUTOMATED: CPT

## 2024-09-20 PROCEDURE — G0008 ADMIN INFLUENZA VIRUS VAC: HCPCS

## 2024-09-20 PROCEDURE — 90656 IIV3 VACC NO PRSV 0.5 ML IM: CPT

## 2024-09-20 PROCEDURE — 99213 OFFICE O/P EST LOW 20 MIN: CPT

## 2024-09-20 RX ORDER — TRIAMCINOLONE ACETONIDE 1 MG/G
CREAM TOPICAL
Qty: 30 G | Refills: 0 | Status: SHIPPED | OUTPATIENT
Start: 2024-09-20

## 2024-09-20 RX ORDER — ALBUTEROL SULFATE 90 UG/1
2 AEROSOL, METERED RESPIRATORY (INHALATION) EVERY 6 HOURS PRN
Qty: 18 G | Refills: 1 | Status: SHIPPED | OUTPATIENT
Start: 2024-09-20

## 2024-09-20 RX ORDER — MUPIROCIN 20 MG/G
OINTMENT TOPICAL 3 TIMES DAILY
Qty: 30 G | Refills: 0 | Status: SHIPPED | OUTPATIENT
Start: 2024-09-20

## 2024-09-20 ASSESSMENT — ENCOUNTER SYMPTOMS
CONSTIPATION: 1
BACK PAIN: 1

## 2024-09-20 ASSESSMENT — ASTHMA QUESTIONNAIRES
QUESTION_4 LAST FOUR WEEKS HOW OFTEN HAVE YOU USED YOUR RESCUE INHALER OR NEBULIZER MEDICATION (SUCH AS ALBUTEROL): ONE OR TWO TIMES PER DAY
QUESTION_3 LAST FOUR WEEKS HOW OFTEN DID YOUR ASTHMA SYMPTOMS (WHEEZING, COUGHING, SHORTNESS OF BREATH, CHEST TIGHTNESS OR PAIN) WAKE YOU UP AT NIGHT OR EARLIER THAN USUAL IN THE MORNING: ONCE A WEEK
ACT_TOTALSCORE: 14
QUESTION_2 LAST FOUR WEEKS HOW OFTEN HAVE YOU HAD SHORTNESS OF BREATH: ONCE A DAY
QUESTION_5 LAST FOUR WEEKS HOW WOULD YOU RATE YOUR ASTHMA CONTROL: WELL CONTROLLED
ACT_TOTALSCORE: 14
QUESTION_1 LAST FOUR WEEKS HOW MUCH OF THE TIME DID YOUR ASTHMA KEEP YOU FROM GETTING AS MUCH DONE AT WORK, SCHOOL OR AT HOME: SOME OF THE TIME

## 2024-09-20 ASSESSMENT — PAIN SCALES - GENERAL: PAINLEVEL: WORST PAIN (10)

## 2024-09-20 ASSESSMENT — PATIENT HEALTH QUESTIONNAIRE - PHQ9
10. IF YOU CHECKED OFF ANY PROBLEMS, HOW DIFFICULT HAVE THESE PROBLEMS MADE IT FOR YOU TO DO YOUR WORK, TAKE CARE OF THINGS AT HOME, OR GET ALONG WITH OTHER PEOPLE: NOT DIFFICULT AT ALL
SUM OF ALL RESPONSES TO PHQ QUESTIONS 1-9: 9
SUM OF ALL RESPONSES TO PHQ QUESTIONS 1-9: 9

## 2024-09-20 NOTE — TELEPHONE ENCOUNTER
----- Message from Nahum Tran sent at 9/20/2024  1:03 PM CDT -----  I forgot to ask patient this in our appointment. She mentions she takes her dosage around 12:15. Can you just confirm with her by phone if she has been taking it twice daily as prescribed and not just daily?  Just wanted to follow-up on that.

## 2024-09-20 NOTE — PROGRESS NOTES
Assessment & Plan     Primary hypertension    Mild intermittent asthma without complication  - albuterol (PROAIR HFA/PROVENTIL HFA/VENTOLIN HFA) 108 (90 Base) MCG/ACT inhaler; Inhale 2 puffs into the lungs every 6 hours as needed for shortness of breath, wheezing or cough. For more refills,schedule an appointment    Folliculitis  - mupirocin (BACTROBAN) 2 % external ointment; Apply topically 3 times daily. To boil  Patient reports an infected boil in the anal area, for which she previously received a cream. Declined physical exam today. Interested in retrying her old topical. Difficult to diagnose given unclear onset (in the range of 2-5 years ago), and no antibacterial creams noted. Will have patient try mupirocin    Dermatitis  - triamcinolone (KENALOG) 0.1 % external cream; Apply sparingly to affected area three times daily for 14 days (apply to itchy areas on arms)    Encounter for immunization  - INFLUENZA VACCINE, SPLIT VIRUS, TRIVALENT,PF (FLUZONE\FLUARIX)    Chronic midline thoracic back pain  - Physical Therapy  Referral; Future  Midline tenderness in areas of the T10-L1 area with shoulder asymmetry. I wonder if symptoms may relate to some mild scoliosis with differential for spondylosis or facet arthropathy. Recommend follow-up with PT.     Encounter for therapeutic drug monitoring  Patient has had some side effects of the rifampin, including some intermittent orange urine.  She does note some constipation at times and some nausea at times.  She has been taking the dosing twice daily as scheduled.          Subjective   Ayesha is a 34 year old, presenting for the following health issues:  Recheck Medication, Back Pain, and Constipation    Some orange urine. Vomiting at times with starting medication., but overall doing okay.    Wondering about vitamin D; noticing herself getting a little tired again    Mushrooms, avocados, shrimp, cheese (mozarella).  Had constipation and last week. Was  straining to get it out. Then started to feel a little bump.      Back pain: within 6-8 months. Has been having pain in the middle of the back. Has been using a posture  without relief. Had a fall 4 years ago. Non-radiating. Constant. Worse at night with sleeping. Has been trying to get more active. 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   Some incontinence with coughing.  Numbness/tingling- feels it in the fingers. Has been noticing heartburn.      9/20/2024    11:38 AM   Additional Questions   Roomed by Mary RENNER     Back Pain     Constipation     History of Present Illness     Asthma:  She presents for follow up of asthma.  She has no cough, some wheezing, and no shortness of breath.  She is using a relief medication 2-3 times per day. She does not miss any doses of her controller medication throughout the week. Patient is aware of the following triggers: animal dander, dust mites, exercise or sports, humidity, mold and smoke. The patient has not had a visit to the Emergency Room, Urgent Care or Hospital due to asthma since the last clinic visit.     Back Pain:  She presents for follow up of back pain. Patient's back pain is a recurring problem.  Location of back pain:  Right middle of back  Description of back pain: dull ache and sharp  Back pain spreads: right side of neck    Since patient first noticed back pain, pain is: unchanged  Does back pain interfere with her job:  Yes       Hypertension: She presents for follow up of hypertension.  She does not check blood pressure  regularly outside of the clinic. Outside blood pressures have been over 140/90. She does not follow a low salt diet.     She eats 2-3 servings of fruits and vegetables daily.She consumes 2 sweetened beverage(s) daily.She exercises with enough effort to increase her heart rate 10 to 19 minutes per day.  She exercises with enough effort to increase her heart rate 7 days per week.   She is taking medications regularly.         Objective    /88  "(BP Location: Left arm, Patient Position: Sitting, Cuff Size: Adult Regular)   Pulse 86   Temp 98  F (36.7  C) (Temporal)   Resp 14   Ht 1.461 m (4' 9.5\")   LMP  (LMP Unknown)   SpO2 100%   BMI 33.17 kg/m    Body mass index is 33.17 kg/m .  Physical Exam   GENERAL: alert and no distress  RECTAL declined rectal examination today  MS: no gross musculoskeletal defects noted, no edema. Midline tenderness in area of T10-L1. Slight shoulder asymmetry with right shoulder elevated.   NEURO: Achilles, patellar reflexes 2+   PSYCH: mentation appears normal, affect normal/bright            Signed Electronically by: Nahum Tran NP    "

## 2024-10-07 ASSESSMENT — ASTHMA QUESTIONNAIRES
ACT_TOTALSCORE: 19
QUESTION_4 LAST FOUR WEEKS HOW OFTEN HAVE YOU USED YOUR RESCUE INHALER OR NEBULIZER MEDICATION (SUCH AS ALBUTEROL): ONCE A WEEK OR LESS
QUESTION_5 LAST FOUR WEEKS HOW WOULD YOU RATE YOUR ASTHMA CONTROL: SOMEWHAT CONTROLLED
QUESTION_3 LAST FOUR WEEKS HOW OFTEN DID YOUR ASTHMA SYMPTOMS (WHEEZING, COUGHING, SHORTNESS OF BREATH, CHEST TIGHTNESS OR PAIN) WAKE YOU UP AT NIGHT OR EARLIER THAN USUAL IN THE MORNING: NOT AT ALL
QUESTION_1 LAST FOUR WEEKS HOW MUCH OF THE TIME DID YOUR ASTHMA KEEP YOU FROM GETTING AS MUCH DONE AT WORK, SCHOOL OR AT HOME: SOME OF THE TIME
ACT_TOTALSCORE: 19
QUESTION_2 LAST FOUR WEEKS HOW OFTEN HAVE YOU HAD SHORTNESS OF BREATH: ONCE OR TWICE A WEEK

## 2024-10-11 ENCOUNTER — OFFICE VISIT (OUTPATIENT)
Dept: FAMILY MEDICINE | Facility: CLINIC | Age: 34
End: 2024-10-11
Payer: MEDICARE

## 2024-10-11 VITALS
HEART RATE: 87 BPM | BODY MASS INDEX: 34.63 KG/M2 | TEMPERATURE: 97.9 F | RESPIRATION RATE: 16 BRPM | SYSTOLIC BLOOD PRESSURE: 126 MMHG | DIASTOLIC BLOOD PRESSURE: 80 MMHG | OXYGEN SATURATION: 100 % | WEIGHT: 165 LBS | HEIGHT: 58 IN

## 2024-10-11 DIAGNOSIS — I10 PRIMARY HYPERTENSION: Primary | ICD-10-CM

## 2024-10-11 DIAGNOSIS — K21.9 GASTROESOPHAGEAL REFLUX DISEASE WITHOUT ESOPHAGITIS: ICD-10-CM

## 2024-10-11 DIAGNOSIS — Z51.81 ENCOUNTER FOR THERAPEUTIC DRUG MONITORING: ICD-10-CM

## 2024-10-11 DIAGNOSIS — R63.5 WEIGHT GAIN: ICD-10-CM

## 2024-10-11 LAB
ALBUMIN SERPL BCG-MCNC: 4.7 G/DL (ref 3.5–5.2)
ALP SERPL-CCNC: 58 U/L (ref 40–150)
ALT SERPL W P-5'-P-CCNC: 30 U/L (ref 0–50)
AST SERPL W P-5'-P-CCNC: 26 U/L (ref 0–45)
BILIRUB DIRECT SERPL-MCNC: <0.2 MG/DL (ref 0–0.3)
BILIRUB SERPL-MCNC: 0.2 MG/DL
PROT SERPL-MCNC: 7.6 G/DL (ref 6.4–8.3)

## 2024-10-11 PROCEDURE — 36415 COLL VENOUS BLD VENIPUNCTURE: CPT

## 2024-10-11 PROCEDURE — 80076 HEPATIC FUNCTION PANEL: CPT

## 2024-10-11 PROCEDURE — 99213 OFFICE O/P EST LOW 20 MIN: CPT

## 2024-10-11 ASSESSMENT — PATIENT HEALTH QUESTIONNAIRE - PHQ9
SUM OF ALL RESPONSES TO PHQ QUESTIONS 1-9: 12
SUM OF ALL RESPONSES TO PHQ QUESTIONS 1-9: 12
10. IF YOU CHECKED OFF ANY PROBLEMS, HOW DIFFICULT HAVE THESE PROBLEMS MADE IT FOR YOU TO DO YOUR WORK, TAKE CARE OF THINGS AT HOME, OR GET ALONG WITH OTHER PEOPLE: NOT DIFFICULT AT ALL

## 2024-10-11 ASSESSMENT — PAIN SCALES - GENERAL: PAINLEVEL: NO PAIN (0)

## 2024-10-11 NOTE — PROGRESS NOTES
"  Assessment & Plan     Gastroesophageal reflux disease without esophagitis  - omeprazole (PRILOSEC) 20 MG DR capsule; Take 1 capsule (20 mg) by mouth daily.  Advised patient she could try 20 mg for 2 weeks for symptoms and then increase to 40 mg daily if needed.    Weight gain  Discussed weight loss strategies with patient today, including more even distribution of meals, scaling up complex carbs/beans. Emphasized patience with weight loss.    Encounter for therapeutic drug monitoring  - Hepatic panel (Albumin, ALT, AST, Bili, Alk Phos, TP)  Doing well (taking Rifampin everyday). Some side effects of reflux and temporary orange stool/urine.  Follow-up in 1 month related to symptoms.  Patient requested x-ray. We discussed that in the absence of symptoms (no bloody cough, weight loss), x-ray would not show her the status of her infection and would not clear her of needing to continue the rifampin regimen. Patient verbalized understanding.        BMI  Estimated body mass index is 35.09 kg/m  as calculated from the following:    Height as of this encounter: 1.461 m (4' 9.5\").    Weight as of this encounter: 74.8 kg (165 lb).   Weight management plan: Discussed healthy diet and exercise guidelines    Depression Screening Follow Up        10/11/2024    10:43 AM   PHQ   PHQ-9 Total Score 12   Q9: Thoughts of better off dead/self-harm past 2 weeks Not at all       Subjective   Ayesha is a 34 year old, presenting for the following health issues:  Within 12 hours, she gets heartburn in the chest- usually lasts for about 1 hour. Taking medication everyday.      When on the scale, she noticed herself having some weight gain. Has reduced meals to once per day:  Vegetables, chicken, squash. Will have chips at night.    Follow Up (TB)      10/11/2024    10:50 AM   Additional Questions   Roomed by Rodney PENG     History of Present Illness       Reason for visit:  I want to get a x ray of my chest to see if my tb is still in my chest " "or lungs    She eats 0-1 servings of fruits and vegetables daily.She consumes 1 sweetened beverage(s) daily.She exercises with enough effort to increase her heart rate 20 to 29 minutes per day.  She exercises with enough effort to increase her heart rate 7 days per week.   She is taking medications regularly.           Objective    /80 (BP Location: Right arm, Patient Position: Sitting, Cuff Size: Adult Regular)   Pulse 87   Temp 97.9  F (36.6  C) (Temporal)   Resp 16   Ht 1.461 m (4' 9.5\")   Wt 74.8 kg (165 lb)   LMP  (LMP Unknown)   SpO2 100%   BMI 35.09 kg/m    Body mass index is 35.09 kg/m .  Physical Exam   GENERAL: alert and no distress  PSYCH: mentation appears normal, affect normal/bright            Signed Electronically by: Nahum Tran NP    "

## 2024-10-22 DIAGNOSIS — J45.20 MILD INTERMITTENT ASTHMA WITHOUT COMPLICATION: ICD-10-CM

## 2024-10-22 RX ORDER — ALBUTEROL SULFATE 90 UG/1
2 INHALANT RESPIRATORY (INHALATION) EVERY 6 HOURS PRN
Qty: 18 G | Refills: 1 | Status: SHIPPED | OUTPATIENT
Start: 2024-10-22

## 2024-11-21 ENCOUNTER — THERAPY VISIT (OUTPATIENT)
Dept: PHYSICAL THERAPY | Facility: CLINIC | Age: 34
End: 2024-11-21
Payer: COMMERCIAL

## 2024-11-21 DIAGNOSIS — M54.6 CHRONIC MIDLINE THORACIC BACK PAIN: Primary | ICD-10-CM

## 2024-11-21 DIAGNOSIS — G89.29 CHRONIC MIDLINE THORACIC BACK PAIN: Primary | ICD-10-CM
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
## 2024-12-03 ENCOUNTER — TELEPHONE (OUTPATIENT)
Dept: OBGYN | Facility: CLINIC | Age: 34
End: 2024-12-03
Payer: MEDICARE

## 2024-12-03 NOTE — TELEPHONE ENCOUNTER
[facility] Call Center    Phone Message    May a detailed message be left on voicemail: yes     Reason for Call: Other: Pt saw that she is due for her hepatitis b shot and is wondering if she can get it the same time as her depo injection. Per protocols, any other injections not listed need an encounter sent to verify if it is available in clinic. Please contact pt to advise if this can be done or if she needs a different appt.       Action Taken: Message routed to:  Other: [facility]    Travel Screening: Not Applicable     Date of Service:

## 2024-12-11 ENCOUNTER — TELEPHONE (OUTPATIENT)
Dept: FAMILY MEDICINE | Facility: CLINIC | Age: 34
End: 2024-12-11
Payer: MEDICARE

## 2024-12-11 DIAGNOSIS — Z22.7 TB LUNG, LATENT: ICD-10-CM

## 2024-12-11 RX ORDER — RIFAMPIN 300 MG/1
600 CAPSULE ORAL DAILY
Qty: 62 CAPSULE | Refills: 0 | Status: SHIPPED | OUTPATIENT
Start: 2024-12-11 | End: 2025-01-11

## 2024-12-11 RX ORDER — RIFAMPIN 300 MG/1
600 CAPSULE ORAL DAILY
Status: SHIPPED
Start: 2024-09-06 | End: 2024-12-11

## 2024-12-11 RX ORDER — RIFAMPIN 300 MG/1
600 CAPSULE ORAL DAILY
Qty: 48 CAPSULE | Refills: 0 | Status: SHIPPED | OUTPATIENT
Start: 2024-12-11 | End: 2024-12-11

## 2024-12-11 NOTE — TELEPHONE ENCOUNTER
I might have ordered the duration incorrectly when I ordered it in September (I don't remember why it ordered through March, but March is not right).  Or perhaps there was an insurance issue with the amount we ordered at a time. It should be for 4 months, so through 01/04 (120 days).  I'm going to send a refill in.  But after that regimen, then she waits three days, and can then resume drinking on 01/07.  Sorry for the confusions. Could you let her know?  Thanks

## 2024-12-11 NOTE — TELEPHONE ENCOUNTER
"Called pt and she states she has not taken it since Saturday so does not know if she needs to do anything differently. Also she did not start the rifampin until 9/11 she states so she would likely need to go until 1/11/2025.    Also she states she was told not to stop the medication so does not know if she can wait until Jan or should start again now.    Pt is seemingly adamant on not taking medication over phone and would like to talk with Nahum as she states \"I understand that I needed to take it for a few months but what you're telling me and what he told me are completely different and I know you're just relaying his message but I want to speak with him\".    Thanks!  Alexi PHAN RN   [facility]    "

## 2024-12-11 NOTE — TELEPHONE ENCOUNTER
Called patient. Recommend 1 more month of Rifampin (4 months of therapy is the recommended duration), so 1 more month is needed. Patient plans on starting tomorrow when pharmacy has it ready.

## 2024-12-11 NOTE — TELEPHONE ENCOUNTER
FYI - Status Update    Who is Calling: patient    Update: Patient completed medications and would like to see if she can continue drinking beer/alcohol.    Does caller want a call/response back: YES

## 2025-01-14 ASSESSMENT — ASTHMA QUESTIONNAIRES
ACT_TOTALSCORE: 16
QUESTION_4 LAST FOUR WEEKS HOW OFTEN HAVE YOU USED YOUR RESCUE INHALER OR NEBULIZER MEDICATION (SUCH AS ALBUTEROL): TWO OR THREE TIMES PER WEEK
QUESTION_3 LAST FOUR WEEKS HOW OFTEN DID YOUR ASTHMA SYMPTOMS (WHEEZING, COUGHING, SHORTNESS OF BREATH, CHEST TIGHTNESS OR PAIN) WAKE YOU UP AT NIGHT OR EARLIER THAN USUAL IN THE MORNING: ONCE OR TWICE
ACT_TOTALSCORE: 16
QUESTION_2 LAST FOUR WEEKS HOW OFTEN HAVE YOU HAD SHORTNESS OF BREATH: ONCE OR TWICE A WEEK
QUESTION_5 LAST FOUR WEEKS HOW WOULD YOU RATE YOUR ASTHMA CONTROL: SOMEWHAT CONTROLLED
QUESTION_1 LAST FOUR WEEKS HOW MUCH OF THE TIME DID YOUR ASTHMA KEEP YOU FROM GETTING AS MUCH DONE AT WORK, SCHOOL OR AT HOME: MOST OF THE TIME

## 2025-01-14 ASSESSMENT — PATIENT HEALTH QUESTIONNAIRE - PHQ9
SUM OF ALL RESPONSES TO PHQ QUESTIONS 1-9: 8
SUM OF ALL RESPONSES TO PHQ QUESTIONS 1-9: 8
10. IF YOU CHECKED OFF ANY PROBLEMS, HOW DIFFICULT HAVE THESE PROBLEMS MADE IT FOR YOU TO DO YOUR WORK, TAKE CARE OF THINGS AT HOME, OR GET ALONG WITH OTHER PEOPLE: SOMEWHAT DIFFICULT

## 2025-01-15 ENCOUNTER — OFFICE VISIT (OUTPATIENT)
Dept: FAMILY MEDICINE | Facility: CLINIC | Age: 35
End: 2025-01-15
Payer: MEDICARE

## 2025-01-15 VITALS
DIASTOLIC BLOOD PRESSURE: 87 MMHG | RESPIRATION RATE: 19 BRPM | WEIGHT: 164.5 LBS | BODY MASS INDEX: 34.98 KG/M2 | SYSTOLIC BLOOD PRESSURE: 128 MMHG | OXYGEN SATURATION: 100 % | HEART RATE: 85 BPM | TEMPERATURE: 98.2 F

## 2025-01-15 DIAGNOSIS — I10 PRIMARY HYPERTENSION: Primary | ICD-10-CM

## 2025-01-15 DIAGNOSIS — K59.00 CONSTIPATION, UNSPECIFIED CONSTIPATION TYPE: ICD-10-CM

## 2025-01-15 DIAGNOSIS — M54.2 NECK PAIN: ICD-10-CM

## 2025-01-15 DIAGNOSIS — M79.645 PAIN OF LEFT THUMB: ICD-10-CM

## 2025-01-15 DIAGNOSIS — Z51.81 ENCOUNTER FOR THERAPEUTIC DRUG MONITORING: ICD-10-CM

## 2025-01-15 LAB
ALBUMIN SERPL BCG-MCNC: 4.7 G/DL (ref 3.5–5.2)
ALP SERPL-CCNC: 60 U/L (ref 40–150)
ALT SERPL W P-5'-P-CCNC: 28 U/L (ref 0–50)
ANION GAP SERPL CALCULATED.3IONS-SCNC: 10 MMOL/L (ref 7–15)
AST SERPL W P-5'-P-CCNC: 27 U/L (ref 0–45)
BILIRUB DIRECT SERPL-MCNC: <0.2 MG/DL (ref 0–0.3)
BILIRUB SERPL-MCNC: 0.5 MG/DL
BUN SERPL-MCNC: 8.8 MG/DL (ref 6–20)
CALCIUM SERPL-MCNC: 9.1 MG/DL (ref 8.8–10.4)
CHLORIDE SERPL-SCNC: 104 MMOL/L (ref 98–107)
CREAT SERPL-MCNC: 0.58 MG/DL (ref 0.51–0.95)
EGFRCR SERPLBLD CKD-EPI 2021: >90 ML/MIN/1.73M2
GLUCOSE SERPL-MCNC: 79 MG/DL (ref 70–99)
HCO3 SERPL-SCNC: 25 MMOL/L (ref 22–29)
POTASSIUM SERPL-SCNC: 3.9 MMOL/L (ref 3.4–5.3)
PROT SERPL-MCNC: 7.7 G/DL (ref 6.4–8.3)
SODIUM SERPL-SCNC: 139 MMOL/L (ref 135–145)

## 2025-01-15 PROCEDURE — 80053 COMPREHEN METABOLIC PANEL: CPT

## 2025-01-15 PROCEDURE — 99214 OFFICE O/P EST MOD 30 MIN: CPT

## 2025-01-15 PROCEDURE — 82248 BILIRUBIN DIRECT: CPT

## 2025-01-15 PROCEDURE — 36415 COLL VENOUS BLD VENIPUNCTURE: CPT

## 2025-01-15 ASSESSMENT — PAIN SCALES - GENERAL: PAINLEVEL_OUTOF10: MODERATE PAIN (5)
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
## 2025-01-16 ENCOUNTER — PATIENT OUTREACH (OUTPATIENT)
Dept: CARE COORDINATION | Facility: CLINIC | Age: 35
End: 2025-01-16
Payer: MEDICARE

## 2025-01-20 ENCOUNTER — PATIENT OUTREACH (OUTPATIENT)
Dept: CARE COORDINATION | Facility: CLINIC | Age: 35
End: 2025-01-20
Payer: MEDICARE

## 2025-03-02 ENCOUNTER — HEALTH MAINTENANCE LETTER (OUTPATIENT)
Age: 35
End: 2025-03-02

## 2025-03-20 ENCOUNTER — TELEPHONE (OUTPATIENT)
Dept: NEUROLOGY | Facility: CLINIC | Age: 35
End: 2025-03-20
Payer: MEDICARE

## 2025-03-20 NOTE — TELEPHONE ENCOUNTER
Returned patient call regarding questions for provider.  Patient states that she was informed of imaging done in May of 2024/MRI and patient would like to confirm if this is correct because she does not remember any imaging ordered form Nancy in May of last year.     Advised patient that the last imaging available that I can see was done happened in 2023 of April. The patient completed an MRI at this time.    Advised patient to contact persons she was speaking with and see if she is able to get the locations of imaging.     Patient understands and will reach out if further questions.    Renee Steen MA

## 2025-03-20 NOTE — TELEPHONE ENCOUNTER
[facility] Call Center    Phone Message    May a detailed message be left on voicemail: no     Reason for Call: Other: Patient calling in requesting to speak to the clinic. When writer asked what specific question or concern patient has, patient disclosed information and states it is a question or concern for Nancy Babcock PA-C only. Patient disconnected from the call while on hold. Please review as needed.      Action Taken: Message routed to:  Other: [facility]

## 2025-04-23 ENCOUNTER — MYC MEDICAL ADVICE (OUTPATIENT)
Dept: FAMILY MEDICINE | Facility: CLINIC | Age: 35
End: 2025-04-23
Payer: MEDICARE

## 2025-05-01 DIAGNOSIS — F90.9 ATTENTION DEFICIT HYPERACTIVITY DISORDER (ADHD), UNSPECIFIED ADHD TYPE: ICD-10-CM

## 2025-05-01 DIAGNOSIS — F41.9 ANXIETY: ICD-10-CM

## 2025-05-01 DIAGNOSIS — F43.10 PTSD (POST-TRAUMATIC STRESS DISORDER): Primary | ICD-10-CM

## 2025-05-05 ENCOUNTER — PATIENT OUTREACH (OUTPATIENT)
Dept: CARE COORDINATION | Facility: CLINIC | Age: 35
End: 2025-05-05
Payer: MEDICARE

## 2025-05-12 NOTE — PROGRESS NOTES
See note   Answers submitted by the patient for this visit:  Patient Health Questionnaire (Submitted on 5/13/2025)  If you checked off any problems, how difficult have these problems made it for you to do your work, take care of things at home, or get along with other people?: Somewhat difficult  PHQ9 TOTAL SCORE: 11     No

## 2025-05-13 SDOH — HEALTH STABILITY: PHYSICAL HEALTH: ON AVERAGE, HOW MANY MINUTES DO YOU ENGAGE IN EXERCISE AT THIS LEVEL?: 0 MIN

## 2025-05-13 SDOH — HEALTH STABILITY: PHYSICAL HEALTH: ON AVERAGE, HOW MANY DAYS PER WEEK DO YOU ENGAGE IN MODERATE TO STRENUOUS EXERCISE (LIKE A BRISK WALK)?: 1 DAY

## 2025-05-13 ASSESSMENT — ASTHMA QUESTIONNAIRES
QUESTION_1 LAST FOUR WEEKS HOW MUCH OF THE TIME DID YOUR ASTHMA KEEP YOU FROM GETTING AS MUCH DONE AT WORK, SCHOOL OR AT HOME: MOST OF THE TIME
ACT_TOTALSCORE: 14
QUESTION_5 LAST FOUR WEEKS HOW WOULD YOU RATE YOUR ASTHMA CONTROL: SOMEWHAT CONTROLLED
QUESTION_4 LAST FOUR WEEKS HOW OFTEN HAVE YOU USED YOUR RESCUE INHALER OR NEBULIZER MEDICATION (SUCH AS ALBUTEROL): ONCE A WEEK OR LESS
QUESTION_3 LAST FOUR WEEKS HOW OFTEN DID YOUR ASTHMA SYMPTOMS (WHEEZING, COUGHING, SHORTNESS OF BREATH, CHEST TIGHTNESS OR PAIN) WAKE YOU UP AT NIGHT OR EARLIER THAN USUAL IN THE MORNING: ONCE A WEEK
QUESTION_2 LAST FOUR WEEKS HOW OFTEN HAVE YOU HAD SHORTNESS OF BREATH: ONCE A DAY

## 2025-05-13 ASSESSMENT — PATIENT HEALTH QUESTIONNAIRE - PHQ9
SUM OF ALL RESPONSES TO PHQ QUESTIONS 1-9: 11
10. IF YOU CHECKED OFF ANY PROBLEMS, HOW DIFFICULT HAVE THESE PROBLEMS MADE IT FOR YOU TO DO YOUR WORK, TAKE CARE OF THINGS AT HOME, OR GET ALONG WITH OTHER PEOPLE: SOMEWHAT DIFFICULT
SUM OF ALL RESPONSES TO PHQ QUESTIONS 1-9: 11

## 2025-05-13 ASSESSMENT — SOCIAL DETERMINANTS OF HEALTH (SDOH): HOW OFTEN DO YOU GET TOGETHER WITH FRIENDS OR RELATIVES?: PATIENT DECLINED

## 2025-05-14 ENCOUNTER — LAB (OUTPATIENT)
Dept: LAB | Facility: CLINIC | Age: 35
End: 2025-05-14
Payer: MEDICARE

## 2025-05-14 ENCOUNTER — RESULTS FOLLOW-UP (OUTPATIENT)
Dept: FAMILY MEDICINE | Facility: CLINIC | Age: 35
End: 2025-05-14

## 2025-05-14 ENCOUNTER — OFFICE VISIT (OUTPATIENT)
Dept: FAMILY MEDICINE | Facility: CLINIC | Age: 35
End: 2025-05-14
Payer: MEDICARE

## 2025-05-14 ENCOUNTER — ANCILLARY PROCEDURE (OUTPATIENT)
Dept: GENERAL RADIOLOGY | Facility: CLINIC | Age: 35
End: 2025-05-14
Attending: FAMILY MEDICINE
Payer: MEDICARE

## 2025-05-14 VITALS
HEART RATE: 98 BPM | WEIGHT: 169.2 LBS | OXYGEN SATURATION: 100 % | DIASTOLIC BLOOD PRESSURE: 91 MMHG | TEMPERATURE: 99.6 F | BODY MASS INDEX: 35.52 KG/M2 | SYSTOLIC BLOOD PRESSURE: 132 MMHG | HEIGHT: 58 IN

## 2025-05-14 DIAGNOSIS — R39.9 URINARY TRACT INFECTION SYMPTOMS: ICD-10-CM

## 2025-05-14 DIAGNOSIS — I10 PRIMARY HYPERTENSION: ICD-10-CM

## 2025-05-14 DIAGNOSIS — Z00.00 ANNUAL PHYSICAL EXAM: Primary | ICD-10-CM

## 2025-05-14 DIAGNOSIS — Z13.9 ENCOUNTER FOR SCREENING INVOLVING SOCIAL DETERMINANTS OF HEALTH (SDOH): ICD-10-CM

## 2025-05-14 DIAGNOSIS — F41.1 GAD (GENERALIZED ANXIETY DISORDER): ICD-10-CM

## 2025-05-14 DIAGNOSIS — R63.5 WEIGHT GAIN: ICD-10-CM

## 2025-05-14 DIAGNOSIS — F10.10 ALCOHOL ABUSE: ICD-10-CM

## 2025-05-14 DIAGNOSIS — R07.81 RIB PAIN ON RIGHT SIDE: ICD-10-CM

## 2025-05-14 DIAGNOSIS — Z00.00 ENCOUNTER FOR MEDICARE ANNUAL WELLNESS EXAM: ICD-10-CM

## 2025-05-14 DIAGNOSIS — Z11.3 SCREEN FOR STD (SEXUALLY TRANSMITTED DISEASE): ICD-10-CM

## 2025-05-14 DIAGNOSIS — F17.200 NICOTINE DEPENDENCE WITH CURRENT USE: ICD-10-CM

## 2025-05-14 DIAGNOSIS — F10.20 ALCOHOL USE DISORDER, MODERATE, DEPENDENCE (H): ICD-10-CM

## 2025-05-14 DIAGNOSIS — Z13.220 SCREENING FOR HYPERLIPIDEMIA: ICD-10-CM

## 2025-05-14 DIAGNOSIS — Z23 NEED FOR VACCINATION: ICD-10-CM

## 2025-05-14 DIAGNOSIS — G47.00 INSOMNIA, UNSPECIFIED TYPE: ICD-10-CM

## 2025-05-14 DIAGNOSIS — F33.1 MODERATE EPISODE OF RECURRENT MAJOR DEPRESSIVE DISORDER (H): ICD-10-CM

## 2025-05-14 DIAGNOSIS — Z51.81 ENCOUNTER FOR THERAPEUTIC DRUG MONITORING: ICD-10-CM

## 2025-05-14 LAB
ALBUMIN SERPL BCG-MCNC: 4.7 G/DL (ref 3.5–5.2)
ALBUMIN UR-MCNC: 10 MG/DL
ALP SERPL-CCNC: 45 U/L (ref 40–150)
ALT SERPL W P-5'-P-CCNC: 23 U/L (ref 0–50)
ANION GAP SERPL CALCULATED.3IONS-SCNC: 11 MMOL/L (ref 7–15)
APPEARANCE UR: CLEAR
AST SERPL W P-5'-P-CCNC: 26 U/L (ref 0–45)
BACTERIA #/AREA URNS HPF: ABNORMAL /HPF
BILIRUB SERPL-MCNC: 0.4 MG/DL
BILIRUB UR QL STRIP: NEGATIVE
BILIRUBIN DIRECT (ROCHE PRO & PURE): 0.15 MG/DL (ref 0–0.45)
BUN SERPL-MCNC: 11.5 MG/DL (ref 6–20)
CALCIUM SERPL-MCNC: 9.3 MG/DL (ref 8.8–10.4)
CHLORIDE SERPL-SCNC: 105 MMOL/L (ref 98–107)
CHOLEST SERPL-MCNC: 215 MG/DL
COLOR UR AUTO: YELLOW
CREAT SERPL-MCNC: 0.59 MG/DL (ref 0.51–0.95)
EGFRCR SERPLBLD CKD-EPI 2021: >90 ML/MIN/1.73M2
FASTING STATUS PATIENT QL REPORTED: NO
FASTING STATUS PATIENT QL REPORTED: NO
GLUCOSE SERPL-MCNC: 85 MG/DL (ref 70–99)
GLUCOSE UR STRIP-MCNC: NEGATIVE MG/DL
HCO3 SERPL-SCNC: 24 MMOL/L (ref 22–29)
HDLC SERPL-MCNC: 76 MG/DL
HGB UR QL STRIP: ABNORMAL
KETONES UR STRIP-MCNC: NEGATIVE MG/DL
LDLC SERPL CALC-MCNC: 115 MG/DL
LEUKOCYTE ESTERASE UR QL STRIP: NEGATIVE
MUCOUS THREADS #/AREA URNS LPF: PRESENT /LPF
NITRATE UR QL: NEGATIVE
NONHDLC SERPL-MCNC: 139 MG/DL
PH UR STRIP: 6 [PH] (ref 5–7)
POTASSIUM SERPL-SCNC: 3.9 MMOL/L (ref 3.4–5.3)
PROT SERPL-MCNC: 7.6 G/DL (ref 6.4–8.3)
RBC URINE: 4 /HPF
SODIUM SERPL-SCNC: 140 MMOL/L (ref 135–145)
SP GR UR STRIP: 1.03 (ref 1–1.03)
SQUAMOUS EPITHELIAL: 8 /HPF
TRANSITIONAL EPI: <1 /HPF
TRIGL SERPL-MCNC: 121 MG/DL
UROBILINOGEN UR STRIP-MCNC: NORMAL MG/DL
WBC URINE: 2 /HPF

## 2025-05-14 PROCEDURE — 87491 CHLMYD TRACH DNA AMP PROBE: CPT | Performed by: FAMILY MEDICINE

## 2025-05-14 PROCEDURE — 99000 SPECIMEN HANDLING OFFICE-LAB: CPT | Performed by: PATHOLOGY

## 2025-05-14 PROCEDURE — 71046 X-RAY EXAM CHEST 2 VIEWS: CPT | Mod: GC | Performed by: RADIOLOGY

## 2025-05-14 PROCEDURE — 99212 OFFICE O/P EST SF 10 MIN: CPT | Mod: 25 | Performed by: FAMILY MEDICINE

## 2025-05-14 PROCEDURE — 81001 URINALYSIS AUTO W/SCOPE: CPT | Performed by: PATHOLOGY

## 2025-05-14 PROCEDURE — G0439 PPPS, SUBSEQ VISIT: HCPCS | Performed by: FAMILY MEDICINE

## 2025-05-14 PROCEDURE — 3075F SYST BP GE 130 - 139MM HG: CPT | Performed by: FAMILY MEDICINE

## 2025-05-14 PROCEDURE — 3080F DIAST BP >= 90 MM HG: CPT | Performed by: FAMILY MEDICINE

## 2025-05-14 RX ORDER — AMLODIPINE BESYLATE 5 MG/1
5 TABLET ORAL DAILY
Qty: 30 TABLET | Refills: 2 | Status: SHIPPED | OUTPATIENT
Start: 2025-05-14 | End: 2025-05-15

## 2025-05-14 RX ORDER — BUPROPION HYDROCHLORIDE 150 MG/1
TABLET ORAL
Qty: 45 TABLET | Refills: 3 | Status: SHIPPED | OUTPATIENT
Start: 2025-05-14

## 2025-05-14 ASSESSMENT — ANXIETY QUESTIONNAIRES
3. WORRYING TOO MUCH ABOUT DIFFERENT THINGS: NEARLY EVERY DAY
GAD7 TOTAL SCORE: 15
6. BECOMING EASILY ANNOYED OR IRRITABLE: SEVERAL DAYS
GAD7 TOTAL SCORE: 15
7. FEELING AFRAID AS IF SOMETHING AWFUL MIGHT HAPPEN: SEVERAL DAYS
1. FEELING NERVOUS, ANXIOUS, OR ON EDGE: MORE THAN HALF THE DAYS
2. NOT BEING ABLE TO STOP OR CONTROL WORRYING: NEARLY EVERY DAY
IF YOU CHECKED OFF ANY PROBLEMS ON THIS QUESTIONNAIRE, HOW DIFFICULT HAVE THESE PROBLEMS MADE IT FOR YOU TO DO YOUR WORK, TAKE CARE OF THINGS AT HOME, OR GET ALONG WITH OTHER PEOPLE: SOMEWHAT DIFFICULT
5. BEING SO RESTLESS THAT IT IS HARD TO SIT STILL: MORE THAN HALF THE DAYS

## 2025-05-14 ASSESSMENT — PATIENT HEALTH QUESTIONNAIRE - PHQ9: 5. POOR APPETITE OR OVEREATING: NEARLY EVERY DAY

## 2025-05-14 NOTE — PATIENT INSTRUCTIONS
"Blood work today   Depo due between May 30- June 13  Start wellbutrin  Start amlodipine   Pap due 1/2026  Consider AA  Clinic coordination will call you   Urine testing today  Sleep study  Get CXR  Get MRI report  Consider going to [facility]     See me in 3 months       Patient Education   Eating Healthy Foods: Care Instructions  With every meal, you can make healthy food choices. Try to eat a variety of fruits, vegetables, whole grains, lean proteins, and low-fat dairy products. This can help you get the right balance of nutrients, including vitamins and minerals. Small changes add up over time. You can start by adding one healthy food to your meals each day.    Try to make half your plate fruits and vegetables, one-fourth whole grains, and one-fourth lean proteins. Try including dairy with your meals.   Eat more fruits and vegetables. Try to have them with most meals and snacks.   Foods for healthy eating        Fruits   These can be fresh, frozen, canned, or dried.  Try to choose whole fruit rather than fruit juice.  Eat a variety of colors.        Vegetables   These can be fresh, frozen, canned, or dried.  Beans, peas, and lentils count too.        Whole grains   Choose whole-grain breads, cereals, and noodles.  Try brown rice.        Lean proteins   These can include lean meat, poultry, fish, and eggs.  You can also have tofu, beans, peas, lentils, nuts, and seeds.        Dairy   Try milk, yogurt, and cheese.  Choose low-fat or fat-free when you can.  If you need to, use lactose-free milk or fortified plant-based milk products, such as soy milk.        Water   Drink water when you're thirsty.  Limit sugar-sweetened drinks, including soda, fruit drinks, and sports drinks.  Where can you learn more?  Go to https://www.healthwise.net/patiented  Enter T756 in the search box to learn more about \"Eating Healthy Foods: Care Instructions.\"  Current as of: October 7, 2024  Content Version: 14.4 2024-2025 [employer] " [email or web address].   Care instructions adapted under license by your healthcare professional. If you have questions about a medical condition or this instruction, always ask your healthcare professional. [employer] disclaims any warranty or liability for your use of this information.    Preventive Care Advice   This is general advice given by our system to help you stay healthy. However, your care team may have specific advice just for you. Please talk to your care team about your preventive care needs.  Nutrition  Eat 5 or more servings of fruits and vegetables each day.  Try wheat bread, brown rice and whole grain pasta (instead of white bread, rice, and pasta).  Get enough calcium and vitamin D. Check the label on foods and aim for 100% of the RDA (recommended daily allowance).  Lifestyle  Exercise at least 150 minutes each week  (30 minutes a day, 5 days a week).  Do muscle strengthening activities 2 days a week. These help control your weight and prevent disease.  No smoking.  Wear sunscreen to prevent skin cancer.  Have a dental exam and cleaning every 6 months.  Yearly exams  See your health care team every year to talk about:  Any changes in your health.  Any medicines your care team has prescribed.  Preventive care, family planning, and ways to prevent chronic diseases.  Shots (vaccines)   HPV shots (up to age 26), if you've never had them before.  Hepatitis B shots (up to age 59), if you've never had them before.  COVID-19 shot: Get this shot when it's due.  Flu shot: Get a flu shot every year.  Tetanus shot: Get a tetanus shot every 10 years.  Pneumococcal, hepatitis A, and RSV shots: Ask your care team if you need these based on your risk.  Shingles shot (for age 50 and up)  General health tests  Diabetes screening:  Starting at age 35, Get screened for diabetes at least every 3 years.  If you are younger than age 35, ask your care team if you should be screened for diabetes.  Cholesterol test:  At age 39, start having a cholesterol test every 5 years, or more often if advised.  Bone density scan (DEXA): At age 50, ask your care team if you should have this scan for osteoporosis (brittle bones).  Hepatitis C: Get tested at least once in your life.  STIs (sexually transmitted infections)  Before age 24: Ask your care team if you should be screened for STIs.  After age 24: Get screened for STIs if you're at risk. You are at risk for STIs (including HIV) if:  You are sexually active with more than one person.  You don't use condoms every time.  You or a partner was diagnosed with a sexually transmitted infection.  If you are at risk for HIV, ask about PrEP medicine to prevent HIV.  Get tested for HIV at least once in your life, whether you are at risk for HIV or not.  Cancer screening tests  Cervical cancer screening: If you have a cervix, begin getting regular cervical cancer screening tests starting at age 21.  Breast cancer scan (mammogram): If you've ever had breasts, begin having regular mammograms starting at age 40. This is a scan to check for breast cancer.  Colon cancer screening: It is important to start screening for colon cancer at age 45.  Have a colonoscopy test every 10 years (or more often if you're at risk) Or, ask your provider about stool tests like a FIT test every year or Cologuard test every 3 years.  To learn more about your testing options, visit:   .  For help making a decision, visit:   https://bit.ly/ib69361.  Prostate cancer screening test: If you have a prostate, ask your care team if a prostate cancer screening test (PSA) at age 55 is right for you.  Lung cancer screening: If you are a current or former smoker ages 50 to 80, ask your care team if ongoing lung cancer screenings are right for you.  For informational purposes only. Not to replace the advice of your health care provider. Copyright   2023 [employer]. All rights reserved. Clinically reviewed by the [facility]. [place] [id number] - REV 01/24.  Learning About Being Physically Active  What is physical activity?     Being physically active means doing any kind of activity that gets your body moving.  The types of physical activity that can help you get fit and stay healthy include:  Aerobic or \"cardio\" activities. These make your heart beat faster and make you breathe harder, such as brisk walking, riding a bike, or running. They strengthen your heart and lungs and build up your endurance.  Strength training activities. These make your muscles work against, or \"resist,\" something. Examples include lifting weights or doing push-ups. These activities help tone and strengthen your muscles and bones.  Stretches. These let you move your joints and muscles through their full range of motion. Stretching helps you be more flexible.  Reaching a balance between these three types of physical activity is important because each one contributes to your overall fitness.  What are the benefits of being active?  Being active is one of the best things you can do for your health. It helps you to:  Feel stronger and have more energy to do all the things you like to do.  Focus better at school or work.  Feel, think, and sleep better.  Reach and stay at a healthy weight.  Lose fat and build lean muscle.  Lower your risk for serious health problems, including diabetes, heart attack, high blood pressure, and some cancers.  Keep your heart, lungs, bones, muscles, and joints strong and healthy.  How can you make being active part of your life?  Start slowly. Make it your long-term goal to get at least 30 minutes of exercise on most days of the week. Walking is a good choice. You also may want to do other activities, such as running, swimming, cycling, or playing tennis or team sports.  Pick activities that you like--ones that make your heart beat faster, your muscles stronger, and your muscles and joints more " "flexible. If you find more than one thing you like doing, do them all. You don't have to do the same thing every day.  Get your heart pumping every day. Any activity that makes your heart beat faster and keeps it at that rate for a while counts.  Here are some great ways to get your heart beating faster:  Go for a brisk walk, run, or hike.  Go for a swim or bike ride.  Take an online exercise class or dance.  Play a game of touch football, basketball, or soccer.  Play tennis, pickleball, or racquetball.  Climb stairs.  Even some household chores can be aerobic. Just do them at a faster pace. Raking or mowing the lawn, sweeping the garage, and vacuuming and cleaning your home all can help get your heart rate up.  Strengthen your muscles during the week. You don't have to lift heavy weights or grow big, bulky muscles to get stronger. Doing a few simple activities that make your muscles work against, or \"resist,\" something can help you get stronger. Aim for at least twice a week.  For example, you can:  Do push-ups or sit-ups, which use your own body weight as resistance.  Lift weights or dumbbells or use stretch bands at home or in a gym or community center.  Stretch your muscles often. Stretching will help you as you become more active. It can help you stay flexible and loosen tight muscles. It can also help improve your balance and posture and can be a great way to relax.  Be sure to stretch the muscles you'll be using when you work out. It's best to warm your muscles slightly before you stretch them. Walk or do some other light aerobic activity for a few minutes. Then start stretching.  When you stretch your muscles:  Do it slowly. Stretching is not about going fast or making sudden movements.  Don't push or bounce during a stretch.  Hold each stretch for at least 15 to 30 seconds, if you can. You should feel a stretch in the muscle, but not pain.  Breathe out as you do the stretch. Then breathe in as you hold the " "stretch. Don't hold your breath.  If you're worried about how more activity might affect your health, have a checkup before you start. Follow any special advice your doctor gives you for getting a smart start.  Where can you learn more?  Go to https://www.[employer].net/patiented  Enter W332 in the search box to learn more about \"Learning About Being Physically Active.\"  Current as of: July 31, 2024  Content Version: 14.4    1083-2905 [employer].   Care instructions adapted under license by your healthcare professional. If you have questions about a medical condition or this instruction, always ask your healthcare professional. [employer] disclaims any warranty or liability for your use of this information.    Eating Healthy Foods: Care Instructions  With every meal, you can make healthy food choices. Try to eat a variety of fruits, vegetables, whole grains, lean proteins, and low-fat dairy products. This can help you get the right balance of nutrients, including vitamins and minerals. Small changes add up over time. You can start by adding one healthy food to your meals each day.    Try to make half your plate fruits and vegetables, one-fourth whole grains, and one-fourth lean proteins. Try including dairy with your meals.   Eat more fruits and vegetables. Try to have them with most meals and snacks.   Foods for healthy eating        Fruits   These can be fresh, frozen, canned, or dried.  Try to choose whole fruit rather than fruit juice.  Eat a variety of colors.        Vegetables   These can be fresh, frozen, canned, or dried.  Beans, peas, and lentils count too.        Whole grains   Choose whole-grain breads, cereals, and noodles.  Try brown rice.        Lean proteins   These can include lean meat, poultry, fish, and eggs.  You can also have tofu, beans, peas, lentils, nuts, and seeds.        Dairy   Try milk, yogurt, and cheese.  Choose low-fat or fat-free when you can.  If you need to, " "use lactose-free milk or fortified plant-based milk products, such as soy milk.        Water   Drink water when you're thirsty.  Limit sugar-sweetened drinks, including soda, fruit drinks, and sports drinks.  Where can you learn more?  Go to https://www.[employer].net/patiented  Enter T756 in the search box to learn more about \"Eating Healthy Foods: Care Instructions.\"  Current as of: October 7, 2024  Content Version: 14.4    6357-7636 [employer].   Care instructions adapted under license by your healthcare professional. If you have questions about a medical condition or this instruction, always ask your healthcare professional. [employer] disclaims any warranty or liability for your use of this information.    Learning About Stress  What is stress?     Stress is your body's response to a hard situation. Your body can have a physical, emotional, or mental response. Stress is a fact of life for most people, and it affects everyone differently. What causes stress for you may not be stressful for someone else.  A lot of things can cause stress. You may feel stress when you go on a job interview, take a test, or run a race. This kind of short-term stress is normal and even useful. It can help you if you need to work hard or react quickly. For example, stress can help you finish an important job on time.  Long-term stress is caused by ongoing stressful situations or events. Examples of long-term stress include long-term health problems, ongoing problems at work, or conflicts in your family. Long-term stress can harm your health.  How does stress affect your health?  When you are stressed, your body responds as though you are in danger. It makes hormones that speed up your heart, make you breathe faster, and give you a burst of energy. This is called the fight-or-flight stress response. If the stress is over quickly, your body goes back to normal and no harm is done.  But if stress happens too " often or lasts too long, it can have bad effects. Long-term stress can make you more likely to get sick, and it can make symptoms of some diseases worse. If you tense up when you are stressed, you may develop neck, shoulder, or low back pain. Stress is linked to high blood pressure and heart disease.  Stress also harms your emotional health. It can make you mueller, tense, or depressed. Your relationships may suffer, and you may not do well at work or school.  What can you do to manage stress?  You can try these things to help manage stress:   Do something active. Exercise or activity can help reduce stress. Walking is a great way to get started. Even everyday activities such as housecleaning or yard work can help.  Try yoga or nanda chi. These techniques combine exercise and meditation. You may need some training at first to learn them.  Do something you enjoy. For example, listen to music or go to a movie. Practice your hobby or do volunteer work.  Meditate. This can help you relax, because you are not worrying about what happened before or what may happen in the future.  Do guided imagery. Imagine yourself in any setting that helps you feel calm. You can use online videos, books, or a teacher to guide you.  Do breathing exercises. For example:  From a standing position, bend forward from the waist with your knees slightly bent. Let your arms dangle close to the floor.  Breathe in slowly and deeply as you return to a standing position. Roll up slowly and lift your head last.  Hold your breath for just a few seconds in the standing position.  Breathe out slowly and bend forward from the waist.  Let your feelings out. Talk, laugh, cry, and express anger when you need to. Talking with supportive friends or family, a counselor, or a taty leader about your feelings is a healthy way to relieve stress. Avoid discussing your feelings with people who make you feel worse.  Write. It may help to write about things that are  "bothering you. This helps you find out how much stress you feel and what is causing it. When you know this, you can find better ways to cope.  What can you do to prevent stress?  You might try some of these things to help prevent stress:  Manage your time. This helps you find time to do the things you want and need to do.  Get enough sleep. Your body recovers from the stresses of the day while you are sleeping.  Get support. Your family, friends, and community can make a difference in how you experience stress.  Limit your news feed. Avoid or limit time on social media or news that may make you feel stressed.  Do something active. Exercise or activity can help reduce stress. Walking is a great way to get started.  Where can you learn more?  Go to https://www.[employer].Pro.com/patiented  Enter N032 in the search box to learn more about \"Learning About Stress.\"  Current as of: October 24, 2024  Content Version: 14.4    0622-8190 [employer].   Care instructions adapted under license by your healthcare professional. If you have questions about a medical condition or this instruction, always ask your healthcare professional. [employer] disclaims any warranty or liability for your use of this information.    Learning About Sleeping Well  What does sleeping well mean?     Sleeping well means getting enough sleep to feel good and stay healthy. How much sleep is enough varies among people.  The number of hours you sleep and how you feel when you wake up are both important. If you do not feel refreshed, you probably need more sleep. Another sign of not getting enough sleep is feeling tired during the day.  Experts recommend that adults get at least 7 or more hours of sleep per day. Children and older adults need more sleep.  Why is getting enough sleep important?  Getting enough quality sleep is a basic part of good health. When your sleep suffers, your physical health, mood, and your thoughts can suffer " "too. You may find yourself feeling more grumpy or stressed. Not getting enough sleep also can lead to serious problems, including injury, accidents, anxiety, and depression.  What might cause poor sleeping?  Many things can cause sleep problems, including:  Changes to your sleep schedule.  Stress. Stress can be caused by fear about a single event, such as giving a speech. Or you may have ongoing stress, such as worry about work or school.  Depression, anxiety, and other mental or emotional conditions.  Changes in your sleep habits or surroundings. This includes changes that happen where you sleep, such as noise, light, or sleeping in a different bed. It also includes changes in your sleep pattern, such as having jet lag or working a late shift.  Health problems, such as pain, breathing problems, and restless legs syndrome.  Lack of regular exercise.  Using alcohol, nicotine, or caffeine before bed.  How can you help yourself?  Here are some tips that may help you sleep more soundly and wake up feeling more refreshed.  Your sleeping area   Use your bedroom only for sleeping and sex. A bit of light reading may help you fall asleep. But if it doesn't, do your reading elsewhere in the house. Try not to use your TV, computer, smartphone, or tablet while you are in bed.  Be sure your bed is big enough to stretch out comfortably, especially if you have a sleep partner.  Keep your bedroom quiet, dark, and cool. Use curtains, blinds, or a sleep mask to block out light. To block out noise, use earplugs, soothing music, or a \"white noise\" machine.  Your evening and bedtime routine   Create a relaxing bedtime routine. You might want to take a warm shower or bath, or listen to soothing music.  Go to bed at the same time every night. And get up at the same time every morning, even if you feel tired.  What to avoid   Limit caffeine (coffee, tea, caffeinated sodas) during the day, and don't have any for at least 6 hours before " "bedtime.  Avoid drinking alcohol before bedtime. Alcohol can cause you to wake up more often during the night.  Try not to smoke or use tobacco, especially in the evening. Nicotine can keep you awake.  Limit naps during the day, especially close to bedtime.  Avoid lying in bed awake for too long. If you can't fall asleep or if you wake up in the middle of the night and can't get back to sleep within about 20 minutes, get out of bed and go to another room until you feel sleepy.  Avoid taking medicine right before bed that may keep you awake or make you feel hyper or energized. Your doctor can tell you if your medicine may do this and if you can take it earlier in the day.  If you can't sleep   Imagine yourself in a peaceful, pleasant scene. Focus on the details and feelings of being in a place that is relaxing.  Get up and do a quiet or boring activity until you feel sleepy.  Avoid drinking any liquids before going to bed to help prevent waking up often to use the bathroom.  Where can you learn more?  Go to https://www.[employer].net/patiented  Enter J942 in the search box to learn more about \"Learning About Sleeping Well.\"  Current as of: July 31, 2024  Content Version: 14.4    6036-2792 [employer].   Care instructions adapted under license by your healthcare professional. If you have questions about a medical condition or this instruction, always ask your healthcare professional. [employer] disclaims any warranty or liability for your use of this information.    Learning About Depression Screening  What is depression screening?  Depression screening is a way to see if you have depression symptoms. It may be done by a doctor or counselor. It's often part of a routine checkup. That's because your mental health is just as important as your physical health.  Depression is a mental health condition that affects how you feel, think, and act. You may:  Have less energy.  Lose interest in your daily " "activities.  Feel sad and grouchy for a long time.  Depression is very common. It affects people of all ages.  Many things can lead to depression. Some people become depressed after they have a stroke or find out they have a major illness like cancer or heart disease. The death of a loved one or a breakup may lead to depression. It can run in families. Most experts believe that a combination of inherited genes and stressful life events can cause it.  What happens during screening?  You may be asked to fill out a form about your depression symptoms. You and the doctor will discuss your answers. The doctor may ask you more questions to learn more about how you think, act, and feel.  What happens after screening?  If you have symptoms of depression, your doctor will talk to you about your options.  Doctors usually treat depression with medicines or counseling. Often, combining the two works best. Many people don't get help because they think that they'll get over the depression on their own. But people with depression may not get better unless they get treatment.  The cause of depression is not well understood. There may be many factors involved. But if you have depression, it's not your fault.  A serious symptom of depression is thinking about death or suicide. If you or someone you care about talks about this or about feeling hopeless, get help right away.  It's important to know that depression can be treated. Medicine, counseling, and self-care may help.  Where can you learn more?  Go to https://www.[employer].net/patiented  Enter T185 in the search box to learn more about \"Learning About Depression Screening.\"  Current as of: July 31, 2024  Content Version: 14.4    5554-6980 [employer].   Care instructions adapted under license by your healthcare professional. If you have questions about a medical condition or this instruction, always ask your healthcare professional. [employer] disclaims " "any warranty or liability for your use of this information.    9 Ways to Cut Back on Drinking  Maybe you've found yourself drinking more alcohol than you'd prefer. If you want to cut back, here are some ideas to try.    Think before you drink.  Do you really want a drink, or is it just a habit? If you're used to having a drink at a certain time, try doing something else then.     Look for substitutes.  Find some no-alcohol drinks that you enjoy, like flavored seltzer water, tea with honey, or tonic with a slice of lime. Or try alcohol-free beer or \"virgin\" cocktails (without the alcohol).     Drink more water.  Use water to quench your thirst. Drink a glass of water before you have any alcohol. Have another glass along with every drink or between drinks.     Shrink your drink.  For example, have a bottle of beer instead of a pint. Use a smaller glass for wine. Choose drinks with lower alcohol content (ABV%). Or use less liquor and more mixer in cocktails.     Slow down.  It's easy to drink quickly and without thinking about it. Pay attention, and make each drink last longer.     Do the math.  Total up how much you spend on alcohol each month. How much is that a year? If you cut back, what could you do with the money you save?     Take a break.  Choose a day or two each week when you won't drink at all. Notice how you feel on those days, physically and emotionally. How did you sleep? Do you feel better? Over time, add more break days.     Count calories.  Would you like to lose some weight? For some people that's a good motivator for cutting back. Figure out how many calories are in each drink. How many does that add up to in a day? In a week? In a month?     Practice saying no.  Be ready when someone offers you a drink. Try: \"Thanks, I've had enough.\" Or \"Thanks, but I'm cutting back.\" Or \"No, thanks. I feel better when I drink less.\"   Current as of: August 20, 2024  Content Version: 14.4    2023-3992 [employer] " mth sense.   Care instructions adapted under license by your healthcare professional. If you have questions about a medical condition or this instruction, always ask your healthcare professional. [facility] mth sense disclaims any warranty or liability for your use of this information.  Learning About Alcohol Use Disorder  What is alcohol use disorder?  Alcohol use disorder means that a person drinks alcohol even though it causes harm to themselves or others. It can range from mild to severe. The more symptoms of this disorder you have, the more severe it may be. People who have it may find it hard to control their use of alcohol.  People who have this disorder may argue with others about how much they're drinking. Their job may be affected because of drinking. They may drink when it's dangerous or illegal, such as when they drive. They also may have a strong need, or craving, to drink. They may feel like they must drink just to get by. Their drinking may increase their risk of getting hurt or being in a car crash.  Over time, drinking too much alcohol may cause health problems. These may include high blood pressure, liver problems, or problems with digestion.  What are the symptoms?  Maybe you've wondered about your alcohol habits or how to tell if your drinking is becoming a problem.  Here are some of the symptoms of alcohol use disorder. You may have it if you have two or more of the following symptoms:  You drink larger amounts of alcohol than you ever meant to. Or you've been drinking for a longer time than you ever meant to.  You can't cut down or control your use. Or you constantly wish you could cut down.  You spend a lot of time getting or drinking alcohol or recovering from its effects.  You have strong cravings for alcohol.  You can no longer do your main jobs at work, at school, or at home.  You keep drinking alcohol, even though your use hurts your relationships.  You have stopped doing  important activities because of your alcohol use.  You drink alcohol in situations where doing so is dangerous.  You keep drinking alcohol even though you know it's causing health problems.  You need more and more alcohol to get the same effect, or you get less effect from the same amount over time. This is called tolerance.  You have uncomfortable symptoms when you stop drinking alcohol or use less. This is called withdrawal.  Alcohol use disorder can range from mild to severe. The more symptoms you have, the more severe the disorder may be.  You might not realize that your drinking is a problem. You might not drink large amounts when you drink. Or you might go for days or weeks between drinking episodes. But even if you don't drink very often, your drinking could still be harmful and put you at risk.  How is alcohol use disorder treated?  Getting help is up to you. But you don't have to do it alone. There are many people and kinds of treatments that can help.  Treatment for alcohol use disorder can include:  Group therapy, one or more types of counseling, and alcohol education.  Medicines that help to:  Reduce withdrawal symptoms and help you safely stop drinking.  Reduce cravings for alcohol.  Support groups. These groups include Alcoholics Anonymous and P[facility] (Self-Management and Recovery Training).  Some people are able to stop or cut back on drinking with help from a counselor. People who have moderate to severe alcohol use disorder may need medical treatment. They may need to stay in a hospital or treatment center.  You may have a treatment team to help you. This team may include a psychologist or psychiatrist, counselors, doctors, social workers, nurses, and a . A  helps plan and manage your treatment.  Follow-up care is a key part of your treatment and safety. Be sure to make and go to all appointments, and call your doctor if you are having problems. It's also a good idea  "to know your test results and keep a list of the medicines you take.  Where can you learn more?  Go to https://www.[employer].net/patiented  Enter H758 in the search box to learn more about \"Learning About Alcohol Use Disorder.\"  Current as of: August 20, 2024  Content Version: 14.4    2105-1509 [employer].   Care instructions adapted under license by your healthcare professional. If you have questions about a medical condition or this instruction, always ask your healthcare professional. [employer] disclaims any warranty or liability for your use of this information.    Substance Use Disorder: Care Instructions  Overview     You can improve your life and health by stopping your use of alcohol or drugs. When you don't drink or use drugs, you may feel and sleep better. You may get along better with your family, friends, and coworkers. There are medicines and programs that can help with substance use disorder.  How can you care for yourself at home?  Here are some ways to help you stay sober and prevent relapse.  If you have been given medicine to help keep you sober or reduce your cravings, be sure to take it exactly as prescribed.  Talk to your doctor about programs that can help you stop using drugs or drinking alcohol.  Do not keep alcohol or drugs in your home.  Plan ahead. Think about what you'll say if other people ask you to drink or use drugs. Try not to spend time with people who drink or use drugs.  Use the time and money spent on drinking or drugs to do something that's important to you.  Preventing a relapse  Have a plan to deal with relapse. Learn to recognize changes in your thinking that lead you to drink or use drugs. Get help before you start to drink or use drugs again.  Try to stay away from situations, friends, or places that may lead you to drink or use drugs.  If you feel the need to drink alcohol or use drugs again, seek help right away. Call a trusted friend or family " member. Some people get support from organizations such as Narcotics Anonymous or [employer] or from treatment facilities.  If you relapse, get help as soon as you can. Some people make a plan with another person that outlines what they want that person to do for them if they relapse. The plan usually includes how to handle the relapse and who to notify in case of relapse.  Don't give up. Remember that a relapse doesn't mean that you have failed. Use the experience to learn the triggers that lead you to drink or use drugs. Then quit again. Recovery is a lifelong process. Many people have several relapses before they are able to quit for good.  Follow-up care is a key part of your treatment and safety. Be sure to make and go to all appointments, and call your doctor if you are having problems. It's also a good idea to know your test results and keep a list of the medicines you take.  When should you call for help?   Call 911  anytime you think you may need emergency care. For example, call if you or someone else:    Has overdosed or has withdrawal signs. Be sure to tell the emergency workers that you are or someone else is using or trying to quit using drugs. Overdose or withdrawal signs may include:  Losing consciousness.  Seizure.  Seeing or hearing things that aren't there (hallucinations).     Is thinking or talking about suicide or harming others.   Where to get help 24 hours a day, 7 days a week   If you or someone you know talks about suicide, self-harm, a mental health crisis, a substance use crisis, or any other kind of emotional distress, get help right away. You can:    Call the Suicide and Crisis Lifeline at 144.     Call 4-917-201-TALK (1-758.602.1037).     Text HOME to 883798 to access the Crisis Text Line.   Consider saving these numbers in your phone.  Go to [email or web address] for more information or to chat online.  Call your doctor now or seek immediate medical care if:    You are having withdrawal  "symptoms. These may include nausea or vomiting, sweating, shakiness, and anxiety.   Watch closely for changes in your health, and be sure to contact your doctor if:    You have a relapse.     You need more help or support to stop.   Where can you learn more?  Go to https://www.[employer].net/patiented  Enter H573 in the search box to learn more about \"Substance Use Disorder: Care Instructions.\"  Current as of: August 20, 2024  Content Version: 14.4    6036-0970 [employer].   Care instructions adapted under license by your healthcare professional. If you have questions about a medical condition or this instruction, always ask your healthcare professional. [employer] disclaims any warranty or liability for your use of this information.       "
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
## 2025-05-15 ENCOUNTER — PATIENT OUTREACH (OUTPATIENT)
Dept: CARE COORDINATION | Facility: CLINIC | Age: 35
End: 2025-05-15
Payer: MEDICARE

## 2025-05-15 ENCOUNTER — RESULTS FOLLOW-UP (OUTPATIENT)
Dept: INTERNAL MEDICINE | Facility: CLINIC | Age: 35
End: 2025-05-15
Payer: MEDICARE

## 2025-05-15 DIAGNOSIS — F41.1 GAD (GENERALIZED ANXIETY DISORDER): ICD-10-CM

## 2025-05-15 LAB
C TRACH DNA SPEC QL PROBE+SIG AMP: NEGATIVE
N GONORRHOEA DNA SPEC QL NAA+PROBE: NEGATIVE
SPECIMEN TYPE: NORMAL

## 2025-05-15 RX ORDER — AMLODIPINE BESYLATE 5 MG/1
5 TABLET ORAL DAILY
Qty: 90 TABLET | Refills: 0 | Status: SHIPPED | OUTPATIENT
Start: 2025-05-15

## 2025-05-19 ENCOUNTER — DOCUMENTATION ONLY (OUTPATIENT)
Dept: FAMILY MEDICINE | Facility: CLINIC | Age: 35
End: 2025-05-19
Payer: MEDICARE

## 2025-05-19 ENCOUNTER — PATIENT OUTREACH (OUTPATIENT)
Dept: CARE COORDINATION | Facility: CLINIC | Age: 35
End: 2025-05-19
Payer: MEDICARE

## 2025-05-19 NOTE — PROGRESS NOTES
Clinic Care Coordination Contact  [facility]/Voicemail    Clinical Data: Care Coordinator Outreach    Outreach Documentation Number of Outreach Attempt   5/16/2025   3:41 PM 1   5/19/2025   4:13 PM 2       Left message on patient's voicemail with call back information and requested return call.      Plan: Care Coordinator will send care coordination introduction letter with care coordinator contact information and explanation of care coordination services via [employer]. Care Coordinator will do no further outreaches at this time.    Barbara Henderson [facility]  Social Work Care Coordinator  Primary Care Clinic  [facility]  Direct Phone: [phone]

## 2025-05-19 NOTE — LETTER
PRIMARY CARE CARE COORDINATION   [facility] [place]  [address] [place] [address]    May 19, 2025    Ayesha Casas  [address]  [facility] [address]      Dear Ayesha,        I am a clinic care coordinator who works with Mari Ford MD PhD with the Primary Care clinic at the [facility] I have been trying to reach you recently to introduce Clinic Care Coordination. Below is a description of clinic care coordination and how I can further assist you.       The clinic care coordination team is made up of a registered nurse, , and community health worker who understand the health care system. The goal of clinic care coordination is to help you manage your health and improve access to the health care system. Our team works alongside your provider to assist you in determining your health and social needs. We can help you obtain health care and community resources, providing you with necessary information and education. We can work with you through any barriers and develop a care plan that helps coordinate and strengthen the communication between you and your care team. Our services are voluntary and are offered without charge to you personally.    Please feel free to contact me with any questions or concerns regarding care coordination and what we can offer.      We are focused on providing you with the highest-quality healthcare experience possible.    Sincerely,     Barbara Henderson [facility]  Social Work Care Coordinator  Primary Care Clinic  [facility]  Direct Phone: [phone]

## 2025-05-20 RX ORDER — BUPROPION HYDROCHLORIDE 150 MG/1
TABLET ORAL
Qty: 180 TABLET | Refills: 1 | Status: SHIPPED | OUTPATIENT
Start: 2025-05-20

## 2025-05-22 NOTE — TELEPHONE ENCOUNTER
Left Voicemail (1st Attempt) for the patient to call back and schedule the following:    Appointment type: Return/Video Return   Provider: PCP  Return date: Next available   Specialty phone number: [phone]

## 2025-06-04 ENCOUNTER — ALLIED HEALTH/NURSE VISIT (OUTPATIENT)
Dept: FAMILY MEDICINE | Facility: CLINIC | Age: 35
End: 2025-06-04
Payer: MEDICARE

## 2025-06-04 DIAGNOSIS — Z30.42 ENCOUNTER FOR SURVEILLANCE OF INJECTABLE CONTRACEPTIVE: Primary | ICD-10-CM

## 2025-06-04 PROCEDURE — 96372 THER/PROPH/DIAG INJ SC/IM: CPT | Performed by: FAMILY MEDICINE

## 2025-06-04 PROCEDURE — 99207 PR NO CHARGE NURSE ONLY: CPT

## 2025-06-04 RX ADMIN — MEDROXYPROGESTERONE ACETATE 150 MG: 150 INJECTION, SUSPENSION INTRAMUSCULAR at 14:21

## 2025-06-04 NOTE — PROGRESS NOTES
"Clinic Administered Medication Documentation      Depo Provera Documentation    Depo-Provera Standing Order inclusion/exclusion criteria reviewed. {Reference  Depo-Provera Standing Order - click link to log in, then click link again to be taken directly to policy:[id number]}    Is this the initial or subsequent dose of Depo Provera? Subsequent dose - patient is within the acceptable window of time (11-15 weeks) for subsequent injection. Pregnancy test not indicated.    Patient meets: inclusion criteria     Is there an active order (written within the past 365 days, with administrations remaining, not ) in the chart? Yes.     Prior to injection, verified patient identity using patient's name and date of birth. Medication was administered. Please see MAR and medication order for additional information.     Vial/Syringe: Single dose vial. Was entire vial of medication used? Yes    Patient instructed to remain in clinic for 15 minutes and report any adverse reaction to staff immediately.  NEXT INJECTION DUE: 25 - 25    Patient has no refills remaining. {Request new order and route to provider:[id number]::\"Refill encounter opened, order pended\",\"Routed to the provider\"}   "

## 2025-06-17 ENCOUNTER — MYC MEDICAL ADVICE (OUTPATIENT)
Dept: FAMILY MEDICINE | Facility: CLINIC | Age: 35
End: 2025-06-17
Payer: MEDICARE

## 2025-06-17 NOTE — LETTER
June 18, 2025      Ayesha Casas  [address]  [facility] [address]        To Whom It May Concern,     Ayesha started Rifampin the treatment for latent TB on 9/6/2024 and ended the treatment on 1/11/25. Please see the most recent X-ray results below.     Study Result    Narrative & Impression   Exam: XR CHEST 2 VIEWS, 5/14/2025 3:46 PM     Indication: Rib pain on right side     Comparison: None     Findings:   Frontal and lateral upright views of the chest are obtained. Normal  cardiomediastinal silhouette. No pneumothorax or pleural effusion.  Distinct pulmonary vasculature. No focal pulmonary opacities. No  visualized osseous abnormality.                                                                      Impression: No acute abnormality.     I have personally reviewed the examination and initial interpretation  and I agree with the findings.     SUSIE AGUILAR MD          Sincerely,        Nahum Tran NP    Electronically signed

## 2025-06-18 ENCOUNTER — TELEPHONE (OUTPATIENT)
Dept: FAMILY MEDICINE | Facility: CLINIC | Age: 35
End: 2025-06-18
Payer: MEDICARE

## 2025-06-18 NOTE — TELEPHONE ENCOUNTER
Left Voicemail (1st Attempt) and Sent Mychart (1st Attempt) for the patient to call back and schedule the following:    Appointment type: UMP Return or Video Visit  Provider: Dr. Mari Ford  Return date: Next available  Specialty phone number: [phone]

## 2025-06-23 DIAGNOSIS — G43.709 CHRONIC MIGRAINE WITHOUT AURA WITHOUT STATUS MIGRAINOSUS, NOT INTRACTABLE: Primary | ICD-10-CM

## 2025-06-25 ENCOUNTER — TELEPHONE (OUTPATIENT)
Dept: FAMILY MEDICINE | Facility: CLINIC | Age: 35
End: 2025-06-25
Payer: MEDICARE

## 2025-06-25 DIAGNOSIS — F33.1 MODERATE EPISODE OF RECURRENT MAJOR DEPRESSIVE DISORDER (H): ICD-10-CM

## 2025-06-25 DIAGNOSIS — F41.1 GAD (GENERALIZED ANXIETY DISORDER): Primary | ICD-10-CM

## 2025-06-26 NOTE — TELEPHONE ENCOUNTER
"Called pt and pt's mother Génesis Casas on conference call to discuss disability forms for [facility] [employer] referenced in MyC message on 6/23/25. Writer clarified the purpose for forms and assisted Dr. Ford in completing them. While on the call, pt's mother requested a referral to [employer] be submitted for \"Individual Home Support\". When writer asked what Individual Home Support means, pt's mother replied \"all you need to do is write Individual home support on it, that's it\".     Dr. Ford had never heard of Individual Home Support before, nor had Dr. Garcia - both were unsure how this referral should be entered. Dr. Ford requested RN on her team to call [employer] to inquiry what Individual Home Support is, and what type of referral order should be submitted for it.    Agus Snider, EMT 7:08 PM on 6/25/2025    "

## 2025-06-27 NOTE — TELEPHONE ENCOUNTER
"The RN called and spoke with the patient regarding the request for a referral to [employer] for \"Individual Home Support.\"     The patient clarified that the patient was requesting a mental health referral to [employer] in [place], [place] for PTSD, anxiety, and dyslexia.   "

## 2025-06-30 NOTE — TELEPHONE ENCOUNTER
Referral faxed to [employer] [facility] location, at fax # [phone].  Sarah WANG LPN  [facility]

## 2025-07-15 ENCOUNTER — TELEPHONE (OUTPATIENT)
Dept: OBGYN | Facility: CLINIC | Age: 35
End: 2025-07-15
Payer: MEDICARE

## 2025-07-15 NOTE — TELEPHONE ENCOUNTER
This RN spoke to Ayesha. She explains that she was washing her car tires with a chemical soap and was not wearing gloves and may be having a reaction to the soap. She also explains she ate something that day and felt itchy shortly after eating. Denies shortness of breath or any swelling in airways. Denies mouth itchiness. Reports itchiness that started on her back, then went to inner thigh, to her feet. Mainly just her hands and feet itch. Spent quite a bit of time outside yesterday. She ate a turkey and cucumber sandwich with wild rice soup. She has been using OTC hydrocortisone with some relief. She reports that showering brings minimal comfort. When she is done she feels itchy again. This RN encouraged her to try something over the counter. She was asking if she needs to come in and be seen. This RN stated that Dr. Ford is in tomorrow but is 100% booked. Encouraged her to try OTC products and if it does not improve then to be seen by a provider. This RN sent a [employer] message with [place] things she can try. She voiced understanding.

## 2025-08-11 DIAGNOSIS — I10 PRIMARY HYPERTENSION: ICD-10-CM

## 2025-08-12 RX ORDER — AMLODIPINE BESYLATE 5 MG/1
5 TABLET ORAL DAILY
Qty: 90 TABLET | Refills: 0 | Status: SHIPPED | OUTPATIENT
Start: 2025-08-12

## 2025-08-23 ASSESSMENT — ASTHMA QUESTIONNAIRES: ACT_TOTALSCORE: 16

## 2025-08-27 ENCOUNTER — OFFICE VISIT (OUTPATIENT)
Dept: FAMILY MEDICINE | Facility: CLINIC | Age: 35
End: 2025-08-27
Payer: MEDICARE

## 2025-08-27 VITALS
DIASTOLIC BLOOD PRESSURE: 81 MMHG | RESPIRATION RATE: 15 BRPM | OXYGEN SATURATION: 99 % | SYSTOLIC BLOOD PRESSURE: 118 MMHG | HEART RATE: 102 BPM | WEIGHT: 175.6 LBS | HEIGHT: 58 IN | BODY MASS INDEX: 36.86 KG/M2

## 2025-08-27 DIAGNOSIS — M54.50 CHRONIC MIDLINE LOW BACK PAIN WITHOUT SCIATICA: ICD-10-CM

## 2025-08-27 DIAGNOSIS — Z30.8 ENCOUNTER FOR OTHER CONTRACEPTIVE MANAGEMENT: ICD-10-CM

## 2025-08-27 DIAGNOSIS — F41.1 GAD (GENERALIZED ANXIETY DISORDER): ICD-10-CM

## 2025-08-27 DIAGNOSIS — R48.0 DYSLEXIA: ICD-10-CM

## 2025-08-27 DIAGNOSIS — Z72.0 TOBACCO ABUSE: ICD-10-CM

## 2025-08-27 DIAGNOSIS — F10.10 ALCOHOL ABUSE: ICD-10-CM

## 2025-08-27 DIAGNOSIS — F33.1 MODERATE EPISODE OF RECURRENT MAJOR DEPRESSIVE DISORDER (H): ICD-10-CM

## 2025-08-27 DIAGNOSIS — G89.29 CHRONIC MIDLINE LOW BACK PAIN WITHOUT SCIATICA: ICD-10-CM

## 2025-08-27 DIAGNOSIS — Z23 NEED FOR VACCINATION: ICD-10-CM

## 2025-08-27 DIAGNOSIS — F43.10 PTSD (POST-TRAUMATIC STRESS DISORDER): ICD-10-CM

## 2025-08-27 DIAGNOSIS — I10 PRIMARY HYPERTENSION: Primary | ICD-10-CM

## 2025-08-27 RX ORDER — MEDROXYPROGESTERONE ACETATE 150 MG/ML
150 INJECTION, SUSPENSION INTRAMUSCULAR
Status: ACTIVE | OUTPATIENT
Start: 2025-08-27 | End: 2026-08-22

## 2025-08-27 RX ORDER — LISINOPRIL 2.5 MG/1
2.5 TABLET ORAL DAILY
Qty: 30 TABLET | Refills: 1 | Status: SHIPPED | OUTPATIENT
Start: 2025-08-27

## 2025-08-27 RX ADMIN — MEDROXYPROGESTERONE ACETATE 150 MG: 150 INJECTION, SUSPENSION INTRAMUSCULAR at 13:35

## 2025-08-27 ASSESSMENT — ANXIETY QUESTIONNAIRES
1. FEELING NERVOUS, ANXIOUS, OR ON EDGE: MORE THAN HALF THE DAYS
5. BEING SO RESTLESS THAT IT IS HARD TO SIT STILL: SEVERAL DAYS
7. FEELING AFRAID AS IF SOMETHING AWFUL MIGHT HAPPEN: SEVERAL DAYS
3. WORRYING TOO MUCH ABOUT DIFFERENT THINGS: NEARLY EVERY DAY
6. BECOMING EASILY ANNOYED OR IRRITABLE: MORE THAN HALF THE DAYS
GAD7 TOTAL SCORE: 13
2. NOT BEING ABLE TO STOP OR CONTROL WORRYING: MORE THAN HALF THE DAYS
IF YOU CHECKED OFF ANY PROBLEMS ON THIS QUESTIONNAIRE, HOW DIFFICULT HAVE THESE PROBLEMS MADE IT FOR YOU TO DO YOUR WORK, TAKE CARE OF THINGS AT HOME, OR GET ALONG WITH OTHER PEOPLE: SOMEWHAT DIFFICULT
GAD7 TOTAL SCORE: 13

## 2025-08-27 ASSESSMENT — PATIENT HEALTH QUESTIONNAIRE - PHQ9
5. POOR APPETITE OR OVEREATING: MORE THAN HALF THE DAYS
SUM OF ALL RESPONSES TO PHQ QUESTIONS 1-9: 10

## 2025-08-28 ENCOUNTER — PATIENT OUTREACH (OUTPATIENT)
Dept: CARE COORDINATION | Facility: CLINIC | Age: 35
End: 2025-08-28
Payer: MEDICARE

## 2025-09-01 ENCOUNTER — PATIENT OUTREACH (OUTPATIENT)
Dept: CARE COORDINATION | Facility: CLINIC | Age: 35
End: 2025-09-01
Payer: MEDICARE

## (undated) RX ORDER — MEDROXYPROGESTERONE ACETATE 150 MG/ML
INJECTION, SUSPENSION INTRAMUSCULAR
Status: DISPENSED
Start: 2025-08-27

## (undated) RX ORDER — MEDROXYPROGESTERONE ACETATE 150 MG/ML
INJECTION, SUSPENSION INTRAMUSCULAR
Status: DISPENSED
Start: 2024-05-17

## (undated) RX ORDER — MEDROXYPROGESTERONE ACETATE 150 MG/ML
INJECTION, SUSPENSION INTRAMUSCULAR
Status: DISPENSED
Start: 2024-08-15